# Patient Record
Sex: MALE | Race: WHITE | Employment: FULL TIME | URBAN - METROPOLITAN AREA
[De-identification: names, ages, dates, MRNs, and addresses within clinical notes are randomized per-mention and may not be internally consistent; named-entity substitution may affect disease eponyms.]

---

## 2017-01-30 RX ORDER — METOPROLOL SUCCINATE 25 MG/1
TABLET, EXTENDED RELEASE ORAL
Qty: 30 TAB | Refills: 6 | Status: SHIPPED | OUTPATIENT
Start: 2017-01-30 | End: 2017-12-16 | Stop reason: SDUPTHER

## 2017-03-22 RX ORDER — CLOPIDOGREL BISULFATE 75 MG/1
TABLET ORAL
Qty: 30 TAB | Refills: 5 | Status: SHIPPED | OUTPATIENT
Start: 2017-03-22 | End: 2017-03-23 | Stop reason: SDUPTHER

## 2017-03-24 RX ORDER — CLOPIDOGREL BISULFATE 75 MG/1
75 TABLET ORAL DAILY
Qty: 30 TAB | Refills: 5 | Status: SHIPPED | OUTPATIENT
Start: 2017-03-24 | End: 2017-09-01 | Stop reason: SDUPTHER

## 2017-05-18 ENCOUNTER — OFFICE VISIT (OUTPATIENT)
Dept: VASCULAR SURGERY | Age: 57
End: 2017-05-18

## 2017-05-18 DIAGNOSIS — Z98.890 S/P CAROTID ENDARTERECTOMY: ICD-10-CM

## 2017-05-18 NOTE — PROCEDURES
[facility]   *** FINAL REPORT ***    Name: Miranda Ferreira  MRN: [id number]       Outpatient  : 1960  HIS Order #: [id number]  [id number] [facility] Visit #: [id number]  Date: 18 May 2017    TYPE OF TEST: Cerebrovascular Duplex    REASON FOR TEST  Asymptomatic-post op follow up    Right Carotid:-             Proximal               Mid                 Distal  cm/s  Systolic  Diastolic  Systolic  Diastolic  Systolic  Diastolic  CCA:     72.6      24.0       55.0      21.0       48.0      19.0  Bulb:    92.0      21.0  ECA:    109.0      30.0  ICA:     64.0      14.0       90.0      26.0      105.0      53.0  ICA/CCA:  1.2       2.2    ICA Stenosis: <50%    Right Vertebral:-  Finding: Antegrade  Sys:       47.0  Lily:       14.0    Right Subclavian:    Left Carotid:-            Proximal                Mid                 Distal  cm/s  Systolic  Diastolic  Systolic  Diastolic  Systolic  Diastolic  CCA:    602.4      39.0       60.0      25.0       62.0      23.0  Bulb:    68.0      23.0  ECA:    100.0      25.0  ICA:    105.0      51.0       87.0      45.0       76.0      27.0  ICA/CCA:  1.0       1.3    ICA Stenosis: <50%    Left Vertebral:-  Finding: Antegrade  Sys:       43.0  Lily:       17.0    Left Subclavian:    INTERPRETATION/FINDINGS  Duplex images were obtained using 2-D gray scale, color flow and  spectral doppler analysis. 1. Patent right carotid endarterectomy with mild plaquing in the less  than 50% range. 2 .Mild plaquing of the left internal carotid artery in the less than  50% range. 3. No significant stenosis in the external carotid arteries  bilaterally. 4. Antegrade flow in both vertebral arteries. Plaque Morphology:  1. Homogeneous plaque in the bulb and right ICA. 2. Heterogeneous plaque in the bulb and left ICA. Compared to the previous study on 16 there is no change on the  left and there is mild plaquing on the right.     ADDITIONAL COMMENTS    I have personally reviewed the data relevant to the interpretation of  this  study. TECHNOLOGIST: Ramesh Curiel RVT, AARON  Signed: 05/18/2017 04:24 PM    PHYSICIAN: Raymond Harvey D.O.   Signed: 05/19/2017 09:29 AM

## 2017-06-16 ENCOUNTER — OFFICE VISIT (OUTPATIENT)
Dept: VASCULAR SURGERY | Age: 57
End: 2017-06-16

## 2017-06-16 VITALS
SYSTOLIC BLOOD PRESSURE: 132 MMHG | HEART RATE: 72 BPM | WEIGHT: 158 LBS | BODY MASS INDEX: 25.39 KG/M2 | HEIGHT: 66 IN | DIASTOLIC BLOOD PRESSURE: 84 MMHG | RESPIRATION RATE: 12 BRPM

## 2017-06-16 DIAGNOSIS — I65.23 CAROTID STENOSIS, ASYMPTOMATIC, BILATERAL: Primary | ICD-10-CM

## 2017-06-16 DIAGNOSIS — Z98.890 H/O CAROTID ENDARTERECTOMY: ICD-10-CM

## 2017-06-16 NOTE — PROGRESS NOTES
Jasmin Leon    Chief Complaint   Patient presents with    Carotid Artery Stenosis       History and Physical    Mr Fercho Madera is here now almost 3 years s/p a R CEA  He had suffered CVA and he has remained with some residual peripheral vision issues and numbness in the right arm. But he is overall grateful to still be functional. He is back to regular work routine though he has changed jobs.      He has continued to follow with neurology (Dr Cal Lugo) and has accepted his symptoms are probably not going to further improve at this point. He remains on plavix/asa. He did have STEMI at same time frame as the CVA. He follows with cardiology now on a yearly basis as well    Some persistent dizziness is leading to a follow up with ENT specialist soon    Past Medical History:   Diagnosis Date    Asthma     Autoimmune disease ([facility] [address]     psoriasis, ulcerative colitis    CAD in native artery     inf STEMI (april 2014) managed medically due to concomittant stroke; cath performed few weeks later: mild LM, LAD, RCA; 55% AVLCx (dominant).  Colitis     Colon cancer ([facility] [address]     Dyslipidemia     Heart attack ([facility] [address]     Hx of transesophageal echocardiography ([facility]) 04/28/2014    EF 50%. Basal-mid inferior hypk. No LA appendage thrombus. No R-L atrial septal shunt by IV contrast.  Mild ALYCE. No cardioembolic source for stroke.  Panic attack     S/P cardiac catheterization 05/06/2014    LM patent. LAD (sm) mild. AV-CX 55% w/linear filling defect prox to apex suggesting intimal tear. RCA very sm, nondom.      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 I69.30    Hemianopia, homonymous, left H53.462    History of ST elevation myocardial infarction (STEMI) I25.2    Vertigo R42    Headache R51    Paresthesia of right arm R20.2  Migraine, unspecified, with intractable migraine, so stated, without mention of status migrainosus G43.919    CAD in native artery I25.10    S/P carotid endarterectomy Z98.890    Colitis K52.9    Dyslipidemia E78.5    CVA, old, disturbances of vision I69.398, H53.9     Past Surgical History:   Procedure Laterality Date    HX APPENDECTOMY      HX HEART CATHETERIZATION      HX HERNIA REPAIR      HX ORTHOPAEDIC Left     Left wrist sx    NEUROLOGICAL PROCEDURE UNLISTED  6/2014    Cerbral Angiogram    VASCULAR SURGERY PROCEDURE UNLIST      R carotid endartectomy with angioplasty      Current Outpatient Prescriptions   Medication Sig Dispense Refill    clopidogrel (PLAVIX) 75 mg tab Take 1 Tab by mouth daily. 30 Tab 5    metoprolol succinate (TOPROL-XL) 25 mg XL tablet TAKE ONE TABLET BY MOUTH ONCE DAILY 30 Tab 6    pravastatin (PRAVACHOL) 80 mg tablet Take 80 mg by mouth nightly.  pantoprazole (PROTONIX) 40 mg tablet Take 40 mg by mouth daily.  topiramate (TOPAMAX) 50 mg tablet Take 1/2 tab every morning, take 1 tab at bedtime 50 tablet 5    aspirin 81 mg chewable tablet Take 162 mg by mouth daily.  ALBUTEROL IN Take  by inhalation.  buPROPion XL (WELLBUTRIN XL) 300 mg XL tablet Take 150 mg by mouth every morning.  budesonide-formoterol (SYMBICORT) 160-4.5 mcg/actuation HFA inhaler Take 2 Puffs by inhalation two (2) times a day.  nitroglycerin (NITROSTAT) 0.4 mg SL tablet 1 Tab by SubLINGual route every five (5) minutes as needed for Chest Pain (up to 3 doses only).  30 Tab 0     Allergies   Allergen Reactions    Cat Dander Itching    Chlorhexidine Gluconate Itching    Dog Dander Itching    Lipitor [Atorvastatin] Other (comments)     dizziness    Seafood Swelling    Zocor [Simvastatin] Vertigo     Same as with lipitor, once on it for about two weeks experienced vertigo, stopped when he discontinued it       Review of Systems    Review of Systems - History obtained from the patient  General ROS: negative  Psychological ROS: negative  Ophthalmic ROS: positive for - still with peripheral vision issues  Respiratory ROS: negative  Cardiovascular ROS: negative  Gastrointestinal ROS: negative  Musculoskeletal ROS: negative  Neurological ROS: residual right arm numbness, migraines, dizziness  Vascular ROS: no claudication    Physical   Visit Vitals    /84 (BP 1 Location: Left arm, BP Patient Position: Sitting)    Pulse 72    Resp 12    Ht 5' 6\" (1.676 m)    Wt 158 lb (71.7 kg)    BMI 25.5 kg/m2     General:   Alert, cooperative, no distress.    Head:   Normocephalic, without obvious abnormality, atraumatic.    Eyes:     Conjunctivae/corneas clear. Pupils equal, round, reactive to light. Extraocular movements intact.    Neck:          R CEA scar, no bruits    Lungs:    Clear to auscultation bilaterally.    Heart:   Regular rate and rhythm, S1, S2 normal    Extremities:  Extremities normal, atraumatic, no cyanosis or edema.    Pulses:  2+ and symmetric all extremities.    Skin:  Skin color, texture, turgor normal. No rashes or lesions.          Vascular studies:  1. Patent right carotid endarterectomy with mild plaquing in the less  than 50% range. 2 .Mild plaquing of the left internal carotid artery in the less than  50% range. 3. No significant stenosis in the external carotid arteries  bilaterally. 4. Antegrade flow in both vertebral arteries. Plaque Morphology:  1. Homogeneous plaque in the bulb and right ICA. 2. Heterogeneous plaque in the bulb and left ICA. Compared to the previous study on 5-9-16 there is no change on the  left and there is mild plaquing on the right. Impression/Plan:     ICD-10-CM ICD-9-CM    1. Carotid stenosis, asymptomatic, bilateral I65.23 433.10 DUPLEX CAROTID BILATERAL AMB     433.30    2.  H/O carotid endarterectomy Z98.890 V45.89 DUPLEX CAROTID BILATERAL AMB     Orders Placed This Encounter    DUPLEX CAROTID BILATERAL AMB Results reviewed and he was encouraged everything looks well and we can continue yearly follow up out to five years post op and then every 2 years thereafter if everything is stable. Continue antiplatelet therapy  Call if any questions/concerns prior to next visit    Follow-up Disposition:  Return in about 1 year (around 6/16/2018). FABIO Quezada    Portions of this note have been entered using voice recognition software.

## 2017-08-02 ENCOUNTER — HOSPITAL ENCOUNTER (EMERGENCY)
Age: 57
Discharge: HOME | End: 2017-08-02
Attending: EMERGENCY MEDICINE
Payer: COMMERCIAL

## 2017-08-02 ENCOUNTER — APPOINTMENT (OUTPATIENT)
Dept: CT IMAGING | Age: 57
End: 2017-08-02
Attending: EMERGENCY MEDICINE
Payer: COMMERCIAL

## 2017-08-02 VITALS
DIASTOLIC BLOOD PRESSURE: 75 MMHG | HEIGHT: 66 IN | OXYGEN SATURATION: 100 % | BODY MASS INDEX: 25.71 KG/M2 | WEIGHT: 160 LBS | TEMPERATURE: 98 F | HEART RATE: 56 BPM | RESPIRATION RATE: 13 BRPM | SYSTOLIC BLOOD PRESSURE: 141 MMHG

## 2017-08-02 DIAGNOSIS — G43.009 ATYPICAL MIGRAINE: Primary | ICD-10-CM

## 2017-08-02 LAB
ANION GAP BLD CALC-SCNC: 12 MMOL/L (ref 3–18)
BASOPHILS # BLD AUTO: 0 K/UL (ref 0–0.06)
BASOPHILS # BLD: 0 % (ref 0–2)
BUN SERPL-MCNC: 7 MG/DL (ref 7–18)
BUN/CREAT SERPL: 6 (ref 12–20)
CALCIUM SERPL-MCNC: 8.2 MG/DL (ref 8.5–10.1)
CHLORIDE SERPL-SCNC: 107 MMOL/L (ref 100–108)
CO2 SERPL-SCNC: 23 MMOL/L (ref 21–32)
CREAT SERPL-MCNC: 1.19 MG/DL (ref 0.6–1.3)
DIFFERENTIAL METHOD BLD: ABNORMAL
EOSINOPHIL # BLD: 0.6 K/UL (ref 0–0.4)
EOSINOPHIL NFR BLD: 7 % (ref 0–5)
ERYTHROCYTE [DISTWIDTH] IN BLOOD BY AUTOMATED COUNT: 12.7 % (ref 11.6–14.5)
GLUCOSE SERPL-MCNC: 110 MG/DL (ref 74–99)
HCT VFR BLD AUTO: 44.1 % (ref 36–48)
HGB BLD-MCNC: 14.3 G/DL (ref 13–16)
LYMPHOCYTES # BLD AUTO: 18 % (ref 21–52)
LYMPHOCYTES # BLD: 1.7 K/UL (ref 0.9–3.6)
MAGNESIUM SERPL-MCNC: 1.8 MG/DL (ref 1.6–2.6)
MCH RBC QN AUTO: 30.8 PG (ref 24–34)
MCHC RBC AUTO-ENTMCNC: 32.4 G/DL (ref 31–37)
MCV RBC AUTO: 94.8 FL (ref 74–97)
MONOCYTES # BLD: 0.9 K/UL (ref 0.05–1.2)
MONOCYTES NFR BLD AUTO: 9 % (ref 3–10)
NEUTS SEG # BLD: 6 K/UL (ref 1.8–8)
NEUTS SEG NFR BLD AUTO: 66 % (ref 40–73)
PLATELET # BLD AUTO: 325 K/UL (ref 135–420)
PMV BLD AUTO: 9.9 FL (ref 9.2–11.8)
POTASSIUM SERPL-SCNC: 4.2 MMOL/L (ref 3.5–5.5)
RBC # BLD AUTO: 4.65 M/UL (ref 4.7–5.5)
SODIUM SERPL-SCNC: 142 MMOL/L (ref 136–145)
WBC # BLD AUTO: 9.2 K/UL (ref 4.6–13.2)

## 2017-08-02 PROCEDURE — 70450 CT HEAD/BRAIN W/O DYE: CPT

## 2017-08-02 PROCEDURE — 83735 ASSAY OF MAGNESIUM: CPT | Performed by: EMERGENCY MEDICINE

## 2017-08-02 PROCEDURE — 93005 ELECTROCARDIOGRAM TRACING: CPT

## 2017-08-02 PROCEDURE — 80048 BASIC METABOLIC PNL TOTAL CA: CPT | Performed by: EMERGENCY MEDICINE

## 2017-08-02 PROCEDURE — 99284 EMERGENCY DEPT VISIT MOD MDM: CPT

## 2017-08-02 PROCEDURE — 85025 COMPLETE CBC W/AUTO DIFF WBC: CPT | Performed by: EMERGENCY MEDICINE

## 2017-08-02 RX ORDER — OXYCODONE AND ACETAMINOPHEN 5; 325 MG/1; MG/1
TABLET ORAL
Qty: 12 TAB | Refills: 0 | Status: SHIPPED | OUTPATIENT
Start: 2017-08-02 | End: 2017-09-01 | Stop reason: ALTCHOICE

## 2017-08-02 NOTE — ED TRIAGE NOTES
Pt  Co  Frontal headache  States awoke with some  Dizziness and  Fell while in the shower.  Started approx 1.5 hours ago and  States it was a punch sensation to the center of the foredhead  Also co  Visual disturbances and diff with  coordination

## 2017-08-02 NOTE — ED PROVIDER NOTES
HPI Comments: 10:44 AM Jazzy Burgos is a 49 y.o. male with a history of CAD who presents to ED via EMS for the evaluation of a severe HA that started half and hour ago. Pt states feeling dizzy last night and this morning. Pt says that he fell in the shower this morning and when he got up the room was spinning. He also states that while in the car he felt a sharp pain in his head and loss control when trying to back out but successfully called EMS. Pt is still dizzy and nauseous but denies vomiting, CP, and SOB. Pt explains that he has chronic migraines and is taking Topamax daily. No other complaints, associated symptoms or modifying factors at this time. PCP: Joshua Giles MD      The history is provided by the patient. No  was used. Past Medical History:   Diagnosis Date    Asthma     Autoimmune disease ([facility] [address]     psoriasis, ulcerative colitis    CAD in native artery     inf STEMI (april 2014) managed medically due to concomittant stroke; cath performed few weeks later: mild LM, LAD, RCA; 55% AVLCx (dominant).  Colitis     Colon cancer ([facility] [address]     Dyslipidemia     Heart attack ([facility] [address]     Hx of transesophageal echocardiography ([facility]) 04/28/2014    EF 50%. Basal-mid inferior hypk. No LA appendage thrombus. No R-L atrial septal shunt by IV contrast.  Mild ALYCE. No cardioembolic source for stroke.  Panic attack     S/P cardiac catheterization 05/06/2014    LM patent. LAD (sm) mild. AV-CX 55% w/linear filling defect prox to apex suggesting intimal tear. RCA very sm, nondom.       S/P carotid endarterectomy     R CEA (July 2014)    Stroke [facility]) 4/23/14    Parasthesis right saide, blindness left eye       Past Surgical History:   Procedure Laterality Date    HX APPENDECTOMY      HX HEART CATHETERIZATION      HX HERNIA REPAIR      HX ORTHOPAEDIC Left     Left wrist sx    NEUROLOGICAL PROCEDURE UNLISTED  6/2014    Cerbral Angiogram    VASCULAR SURGERY PROCEDURE UNLIST      R carotid endartectomy with angioplasty          Family History:   Problem Relation Age of Onset    Heart Attack Father        Social History     Social History    Marital status: SINGLE     Spouse name: N/A    Number of children: N/A    Years of education: N/A     Occupational History    Not on file. Social History Main Topics    Smoking status: Former Smoker     Packs/day: 1.00     Years: 41.00     Types: Cigarettes     Quit date: 4/23/2014    Smokeless tobacco: Never Used    Alcohol use Yes      Comment: social--very light now    Drug use: No    Sexual activity: Yes     Partners: Female     Other Topics Concern    Not on file     Social History Narrative         ALLERGIES: Cat dander; Chlorhexidine gluconate; Dog dander; Lipitor [atorvastatin]; Seafood; and Zocor [simvastatin]    Review of Systems   Respiratory: Negative for shortness of breath. Cardiovascular: Negative for chest pain. Gastrointestinal: Positive for nausea. Negative for vomiting. Neurological: Positive for dizziness and headaches. All other systems reviewed and are negative. Vitals:    08/02/17 1037   BP: 145/87   Pulse: 65   Resp: 18   Temp: 98 °F (36.7 °C)   SpO2: 98%   Weight: 72.6 kg (160 lb)   Height: 5' 6\" (1.676 m)            Physical Exam   Constitutional: He is oriented to person, place, and time. He appears well-developed and well-nourished. HENT:   Head: Normocephalic and atraumatic. Mouth/Throat: Oropharynx is clear and moist.   Eyes: Conjunctivae are normal. Pupils are equal, round, and reactive to light. No scleral icterus. Neck: Normal range of motion. Neck supple. No JVD present. No tracheal deviation present. Cardiovascular: Normal rate, regular rhythm and normal heart sounds. Pulmonary/Chest: Effort normal and breath sounds normal. No respiratory distress. He has no wheezes. Abdominal: Soft. Bowel sounds are normal.   Musculoskeletal: Normal range of motion.    Neurological: He is alert and oriented to person, place, and time. He has normal strength. Gait normal. GCS eye subscore is 4. GCS verbal subscore is 5. GCS motor subscore is 6. Skin: Skin is warm and dry. Psychiatric: He has a normal mood and affect. Nursing note and vitals reviewed. MDM  Number of Diagnoses or Management Options  Atypical migraine:   Diagnosis management comments: PT feeling \"much better now\", results reviewed with pt, he agrees with dispo and F/U plan. Agrees to return if sx worsen or not better in 24 hours. Radha Figueroa MD  1:30 PM         Amount and/or Complexity of Data Reviewed  Clinical lab tests: ordered and reviewed  Tests in the radiology section of CPT®: ordered and reviewed      ED Course       Procedures           Vitals:  Patient Vitals for the past 12 hrs:   Temp Pulse Resp BP SpO2   08/02/17 1037 98 °F (36.7 °C) 65 18 145/87 98 %   Patient is 98% O2 on RA, indicating adequate oxygenation. Medications ordered:   Medications - No data to display      Lab findings:  No results found for this or any previous visit (from the past 12 hour(s)). EKG interpretation by ED Physician:  1:14 PM As read by ED physician, NSR 65 with no acute changes. X-Ray, CT or other radiology findings or impressions:  No results found. Progress notes, Consult notes or additional Procedure notes:       Disposition:  Diagnosis: No diagnosis found. Disposition: Discharged    Follow-up Information     None           Patient's Medications   Start Taking    No medications on file   Continue Taking    ALBUTEROL IN    Take  by inhalation. ASPIRIN 81 MG CHEWABLE TABLET    Take 162 mg by mouth daily. BUDESONIDE-FORMOTEROL (SYMBICORT) 160-4.5 MCG/ACTUATION HFA INHALER    Take 2 Puffs by inhalation two (2) times a day. BUPROPION XL (WELLBUTRIN XL) 300 MG XL TABLET    Take 150 mg by mouth every morning. CLOPIDOGREL (PLAVIX) 75 MG TAB    Take 1 Tab by mouth daily.     METOPROLOL SUCCINATE (TOPROL-XL) 25 MG XL TABLET    TAKE ONE TABLET BY MOUTH ONCE DAILY    NITROGLYCERIN (NITROSTAT) 0.4 MG SL TABLET    1 Tab by SubLINGual route every five (5) minutes as needed for Chest Pain (up to 3 doses only). PANTOPRAZOLE (PROTONIX) 40 MG TABLET    Take 40 mg by mouth daily. PRAVASTATIN (PRAVACHOL) 80 MG TABLET    Take 80 mg by mouth nightly. TOPIRAMATE (TOPAMAX) 50 MG TABLET    Take 1/2 tab every morning, take 1 tab at bedtime   These Medications have changed    No medications on file   Stop Taking    No medications on file       Scribe Attestation:   Madyson Devi acting as a scribe for and in the presence of Blanca Caballero MD August 02, 2017 at 10:44 AM     Signed by: Verna Cerda, August 02, 2017 at 10:44 AM     Provider Attestation:   I personally performed the services described in the documentation, reviewed the documentation, as recorded by the scribe in my presence, and it accurately and completely records my words and actions.      Reviewed and signed by:  Blanca Caballero MD

## 2017-08-02 NOTE — DISCHARGE INSTRUCTIONS
Migraine Headache: Care Instructions  Your Care Instructions  Migraines are painful, throbbing headaches that often start on one side of the head. They may cause nausea and vomiting and make you sensitive to light, sound, or smell. Without treatment, migraines can last from 4 hours to a few days. Medicines can help prevent migraines or stop them after they have started. Your doctor can help you find which ones work best for you. Follow-up care is a key part of your treatment and safety. Be sure to make and go to all appointments, and call your doctor if you are having problems. It's also a good idea to know your test results and keep a list of the medicines you take. How can you care for yourself at home? · Do not drive if you have taken a prescription pain medicine. · Rest in a quiet, dark room until your headache is gone. Close your eyes, and try to relax or go to sleep. Don't watch TV or read. · Put a cold, moist cloth or cold pack on the painful area for 10 to 20 minutes at a time. Put a thin cloth between the cold pack and your skin. · Use a warm, moist towel or a heating pad set on low to relax tight shoulder and neck muscles. · Have someone gently massage your neck and shoulders. · Take your medicines exactly as prescribed. Call your doctor if you think you are having a problem with your medicine. You will get more details on the specific medicines your doctor prescribes. · Be careful not to take pain medicine more often than the instructions allow. You could get worse or more frequent headaches when the medicine wears off. To prevent migraines  · Keep a headache diary so you can figure out what triggers your headaches. Avoiding triggers may help you prevent headaches. Record when each headache began, how long it lasted, and what the pain was like.  (Was it throbbing, aching, stabbing, or dull?) Write down any other symptoms you had with the headache, such as nausea, flashing lights or dark spots, or sensitivity to bright light or loud noise. Note if the headache occurred near your period. List anything that might have triggered the headache. Triggers may include certain foods (chocolate, cheese, wine) or odors, smoke, bright light, stress, or lack of sleep. · If your doctor has prescribed medicine for your migraines, take it as directed. You may have medicine that you take only when you get a migraine and medicine that you take all the time to help prevent migraines. ¨ If your doctor has prescribed medicine for when you get a headache, take it at the first sign of a migraine, unless your doctor has given you other instructions. ¨ If your doctor has prescribed medicine to prevent migraines, take it exactly as prescribed. Call your doctor if you think you are having a problem with your medicine. · Find healthy ways to deal with stress. Migraines are most common during or right after stressful times. Take time to relax before and after you do something that has caused a migraine in the past.  · Try to keep your muscles relaxed by keeping good posture. Check your jaw, face, neck, and shoulder muscles for tension. Try to relax them. When you sit at a desk, change positions often. And make sure to stretch for 30 seconds each hour. · Get plenty of sleep and exercise. · Eat meals on a regular schedule. Avoid foods and drinks that often trigger migraines. These include chocolate, alcohol (especially red wine and port), aspartame, monosodium glutamate (MSG), and some additives found in foods (such as hot dogs, flores, cold cuts, aged cheeses, and pickled foods). · Limit caffeine. Don't drink too much coffee, tea, or soda. But don't quit caffeine suddenly. That can also give you migraines. · Do not smoke or allow others to smoke around you. If you need help quitting, talk to your doctor about stop-smoking programs and medicines. These can increase your chances of quitting for good.   · If you are taking birth control pills or hormone therapy, talk to your doctor about whether they are triggering your migraines. When should you call for help? Call 911 anytime you think you may need emergency care. For example, call if:  · You have signs of a stroke. These may include:  ¨ Sudden numbness, paralysis, or weakness in your face, arm, or leg, especially on only one side of your body. ¨ Sudden vision changes. ¨ Sudden trouble speaking. ¨ Sudden confusion or trouble understanding simple statements. ¨ Sudden problems with walking or balance. ¨ A sudden, severe headache that is different from past headaches. Call your doctor now or seek immediate medical care if:  · You have new or worse nausea and vomiting. · You have a new or higher fever. · Your headache gets much worse. Watch closely for changes in your health, and be sure to contact your doctor if:  · You are not getting better after 2 days (48 hours). Where can you learn more? Go to http://sharon-rosa.info/. Enter X116 in the search box to learn more about \"Migraine Headache: Care Instructions. \"  Current as of: October 14, 2016  Content Version: 11.3  © 7324-6143 [employer]. Care instructions adapted under license by "[employer] (which disclaims liability or warranty for this information). If you have questions about a medical condition or this instruction, always ask your healthcare professional. [address] any warranty or liability for your use of this information.

## 2017-08-02 NOTE — LETTER
NOTIFICATION RETURN TO WORK / SCHOOL 
 
8/2/2017 1:16 PM 
 
Mr. Christa Hinton [address] [place] [id number] [address] [id number] To Whom It May Concern: 
 
Christa Hinton is currently under the care of [address] [place] [address] EMERGENCY DEPT. He will return to work/school on: 8/4/2017 If there are questions or concerns please have the patient contact our office.  
 
 
 
Sincerely, 
 
 
Jackson Lara MD

## 2017-08-03 LAB
ATRIAL RATE: 65 BPM
CALCULATED P AXIS, ECG09: 56 DEGREES
CALCULATED R AXIS, ECG10: -33 DEGREES
CALCULATED T AXIS, ECG11: 4 DEGREES
DIAGNOSIS, 93000: NORMAL
P-R INTERVAL, ECG05: 140 MS
Q-T INTERVAL, ECG07: 438 MS
QRS DURATION, ECG06: 88 MS
QTC CALCULATION (BEZET), ECG08: 455 MS
VENTRICULAR RATE, ECG03: 65 BPM

## 2017-09-01 ENCOUNTER — OFFICE VISIT (OUTPATIENT)
Dept: CARDIOLOGY CLINIC | Age: 57
End: 2017-09-01

## 2017-09-01 VITALS
SYSTOLIC BLOOD PRESSURE: 140 MMHG | HEIGHT: 66 IN | WEIGHT: 166 LBS | HEART RATE: 73 BPM | OXYGEN SATURATION: 98 % | DIASTOLIC BLOOD PRESSURE: 92 MMHG | BODY MASS INDEX: 26.68 KG/M2

## 2017-09-01 DIAGNOSIS — E78.5 DYSLIPIDEMIA: ICD-10-CM

## 2017-09-01 DIAGNOSIS — Z98.890 S/P CAROTID ENDARTERECTOMY: ICD-10-CM

## 2017-09-01 DIAGNOSIS — I77.9 RIGHT-SIDED CAROTID ARTERY DISEASE (HCC): ICD-10-CM

## 2017-09-01 DIAGNOSIS — I25.10 CAD IN NATIVE ARTERY: Primary | ICD-10-CM

## 2017-09-01 RX ORDER — CLOPIDOGREL BISULFATE 75 MG/1
75 TABLET ORAL DAILY
Qty: 30 TAB | Refills: 11 | Status: SHIPPED | OUTPATIENT
Start: 2017-09-01 | End: 2017-09-27 | Stop reason: SDUPTHER

## 2017-09-01 NOTE — MR AVS SNAPSHOT
Visit Information Date & Time Provider Department Dept. Phone Encounter #  
 9/1/2017  9:00 AM Iman Leonard MD [facility] [address] [id number] Your Appointments 6/18/2018 10:00 AM  
PROCEDURE with [facility] 1 [facility] ([facility]) Appt Note: cv knaak 1 year 1212 [facility] [address] [facility] [address]  
[phone] 1212 [facility] 47 [facility]  
  
    
 6/28/2018  9:00 AM  
Follow Up with F[facility] ([facility]) Appt Note: 3 YR CV 90 [place], [place] [address] [facility] [address]  
[phone] 1212 [facility] 6000 [facility], [facility] [address] [facility] [address] Upcoming Health Maintenance Date Due Hepatitis C Screening 1960 DTaP/Tdap/Td series (1 - Tdap) 11/3/1983 FOBT Q 1 YEAR AGE 50-75 11/3/2012 INFLUENZA AGE 9 TO ADULT 8/1/2017 Allergies as of 9/1/2017  Review Complete On: 8/2/2017 By: Lora Cabral RN Severity Noted Reaction Type Reactions Cat Dander  05/22/2014    Itching Chlorhexidine Gluconate  07/07/2014    Itching Dog Dander  05/22/2014    Itching Lipitor [Atorvastatin]  06/19/2014    Other (comments)  
 dizziness Seafood  06/30/2014    Swelling Zocor [Simvastatin]  08/29/2014    Vertigo Same as with lipitor, once on it for about two weeks experienced vertigo, stopped when he discontinued it Current Immunizations  Never Reviewed No immunizations on file. Not reviewed this visit Vitals BP Pulse Height(growth percentile) Weight(growth percentile) SpO2 BMI  
 (!) 140/92 73 5' 6\" (1.676 m) 166 lb (75.3 kg) 98% 26.79 kg/m2 Smoking Status Former Smoker Vitals History BMI and BSA Data Body Mass Index Body Surface Area  
 26.79 kg/m 2 1.87 m 2 Preferred Pharmacy Pharmacy Name Phone [facility] [address] [phone] Your Updated Medication List  
  
   
This list is accurate as of: 9/1/17 10:10 AM.  Always use your most recent med list.  
  
  
  
  
 ALBUTEROL IN Take  by inhalation. aspirin 81 mg chewable tablet Take 162 mg by mouth daily. clopidogrel 75 mg Tab Commonly known as:  PLAVIX Take 1 Tab by mouth daily. metoprolol succinate 25 mg XL tablet Commonly known as:  TOPROL-XL  
TAKE ONE TABLET BY MOUTH ONCE DAILY  
  
 nitroglycerin 0.4 mg SL tablet Commonly known as:  NITROSTAT  
1 Tab by SubLINGual route every five (5) minutes as needed for Chest Pain (up to 3 doses only). pantoprazole 40 mg tablet Commonly known as:  PROTONIX Take 40 mg by mouth daily. pravastatin 80 mg tablet Commonly known as:  PRAVACHOL Take 80 mg by mouth nightly. SYMBICORT 160-4.5 mcg/actuation HFA inhaler Generic drug:  budesonide-formoterol Take 2 Puffs by inhalation two (2) times a day. topiramate 50 mg tablet Commonly known as:  TOPAMAX Take 1/2 tab every morning, take 1 tab at bedtime Please provide this summary of care documentation to your next provider. Your primary care clinician is listed as Fei Ariza. If you have any questions after today's visit, please call [phone].

## 2017-09-01 NOTE — PROGRESS NOTES
HISTORY OF PRESENT ILLNESS  Kelsi Rachel is a 49 y.o. male. HPI      Patient presents for a followup office visit. He has a past medical history significant for single vessel coronary artery disease. He had an inferior wall myocardial infarction in April 2014 which was managed medically because of an ongoing stroke at the same time. He eventually underwent a cardiac catheterization which showed moderate nonobstructive disease in a dominant circumflex vessel. He underwent a stress test in September 2016 was showed a fixed inferior perfusion defect with ejection fraction of 53%, which was not significantly changed compared to his prior study from 2014. He also has a history of carotid artery disease, status post a right CEA in July 2014 which was felt to be the culprit lesion for his CVAs. He was a heavy smoker at that time but has since stopped smoking. He was last seen in the office approximately 12 months ago. Since last visit he states his been doing fairly well. He had a follow-up carotid scan done in May of this year which showed a patent right CEA with mild bilateral disease. His pravastatin dosage was increased up to 80 mg since last visit and a repeat lipid panel showed improvement with the medication adjustment. He denies any chest pain, no shortness of breath, no leg swelling or claudication. No significant change in his activity tolerance. Past Medical History:   Diagnosis Date    Asthma     Autoimmune disease ([address]     psoriasis, ulcerative colitis    CAD in native artery     inf STEMI (april 2014) managed medically due to concomittant stroke; cath performed few weeks later: mild LM, LAD, RCA; 55% AVLCx (dominant).  Colitis     [facility]     Dyslipidemia     Heart attack ([facility]     Hx of transesophageal echocardiography ([facility]) 04/28/2014    EF 50%. Basal-mid inferior hypk. No LA appendage thrombus. No R-L atrial septal shunt by IV contrast.  Mild ALYCE.   No cardioembolic source for stroke.  Panic attack     S/P cardiac catheterization 05/06/2014    LM patent. LAD (sm) mild. AV-CX 55% w/linear filling defect prox to apex suggesting intimal tear. RCA very sm, nondom.  S/P carotid endarterectomy     R CEA (July 2014)    Stroke [facility]) 4/23/14    Parasthesis right saide, blindness left eye      Current Outpatient Prescriptions   Medication Sig Dispense Refill    clopidogrel (PLAVIX) 75 mg tab Take 1 Tab by mouth daily. 30 Tab 5    metoprolol succinate (TOPROL-XL) 25 mg XL tablet TAKE ONE TABLET BY MOUTH ONCE DAILY 30 Tab 6    pravastatin (PRAVACHOL) 80 mg tablet Take 80 mg by mouth nightly.  pantoprazole (PROTONIX) 40 mg tablet Take 40 mg by mouth daily.  topiramate (TOPAMAX) 50 mg tablet Take 1/2 tab every morning, take 1 tab at bedtime (Patient taking differently: Take 100 mg by mouth two (2) times a day. Take 1/2 tab every morning, take 1 tab at bedtime) 50 tablet 5    aspirin 81 mg chewable tablet Take 162 mg by mouth daily.  ALBUTEROL IN Take  by inhalation.  budesonide-formoterol (SYMBICORT) 160-4.5 mcg/actuation HFA inhaler Take 2 Puffs by inhalation two (2) times a day.  nitroglycerin (NITROSTAT) 0.4 mg SL tablet 1 Tab by SubLINGual route every five (5) minutes as needed for Chest Pain (up to 3 doses only).  30 Tab 0       Allergies   Allergen Reactions    Cat Dander Itching    Chlorhexidine Gluconate Itching    Dog Dander Itching    Lipitor [Atorvastatin] Other (comments)     dizziness    Seafood Swelling    Zocor [Simvastatin] Vertigo     Same as with lipitor, once on it for about two weeks experienced vertigo, stopped when he discontinued it      Social History   Substance Use Topics    Smoking status: Former Smoker     Packs/day: 1.00     Years: 41.00     Types: Cigarettes     Quit date: 4/23/2014    Smokeless tobacco: Never Used    Alcohol use Yes      Comment: social--very light now          Review of Systems Constitutional: Negative for chills, fever and weight loss. HENT: Negative for nosebleeds. Eyes: Negative for blurred vision and double vision. Respiratory: Negative for cough, shortness of breath and wheezing. Cardiovascular: Negative for chest pain, palpitations, orthopnea, claudication, leg swelling and PND. Gastrointestinal: Negative for abdominal pain, heartburn, nausea and vomiting. Genitourinary: Negative for dysuria and hematuria. Musculoskeletal: Negative for falls and myalgias. Skin: Negative for rash. Neurological: Negative for dizziness, focal weakness and headaches. Endo/Heme/Allergies: Does not bruise/bleed easily. Psychiatric/Behavioral: Negative for substance abuse. Visit Vitals    BP (!) 140/92    Pulse 73    Ht 5' 6\" (1.676 m)    Wt 75.3 kg (166 lb)    SpO2 98%    BMI 26.79 kg/m2      Physical Exam   Constitutional: He is oriented to person, place, and time. He appears well-developed and well-nourished. HENT:   Head: Normocephalic and atraumatic. Eyes: Conjunctivae are normal.   Neck: Neck supple. No JVD present. Carotid bruit is not present. Cardiovascular: Normal rate, regular rhythm, S1 normal, S2 normal and normal pulses. Exam reveals no gallop and no S3. No murmur heard. Pulmonary/Chest: Breath sounds normal. He has no wheezes. He has no rales. Abdominal: Soft. Bowel sounds are normal. There is no tenderness. Musculoskeletal: He exhibits no edema. Neurological: He is alert and oriented to person, place, and time. Skin: Skin is warm and dry. 8/02/2017 EKG: Normal sinus rhythm, borderline inferior Q waves consistent with prior infarct, no ST or T wave abnormalities. Normal QTc interval.  No change compared to the previous EKG. ASSESSMENT and PLAN    Coronary artery disease.   Patient had single vessel coronary disease on cardiac catheterization in 2014 With a moderate nonobstructive lesion found in his left circumflex, which is a dominant vessel. His last stress test was in September 2016 which demonstrated a fixed inferior wall perfusion defect consistent with prior infarct, but no ischemia. EF 53%. Patient remains on dual antiplatelet therapy with aspirin and Plavix, along with a low-dose beta-blocker and pravastatin. Dyslipidemia. Patient is currently tolerating pravastatin 80 mg daily. He did not tolerate either Lipitor or Crestor because of severe vertigo. His most recent lipid panel from August 2017: Total cholesterol 213, HDL 66, . I would prefer that his LDL around 70. However, this is a significant improvement to his lipids from a year prior. I would advise lifestyle modification, but if his LDL remains similar, would consider 1 of the new injectable PCSK9 inhibitors. Carotid artery disease. Status post right CEA in July 2014 for symptomatic severe disease in the setting of a CVA. Patient had a followup duplex scan in May 2017 which demonstrated widely patent right CEA with mild left ICA disease. History of CVA. The patient does have residual visual field deficits and his high and some hemideficit in his upper extremity. Follow-up annually, sooner if needed.

## 2017-09-01 NOTE — PROGRESS NOTES
1. Have you been to the ER, urgent care clinic since your last visit? Hospitalized since your last visit? Yes, 8/2/17 for diziness     2. Have you seen or consulted any other health care providers outside of the [address] [place] [address] since your last visit? Include any pap smears or colon screening.   No

## 2017-09-27 RX ORDER — CLOPIDOGREL BISULFATE 75 MG/1
75 TABLET ORAL DAILY
Qty: 90 TAB | Refills: 3 | Status: SHIPPED | OUTPATIENT
Start: 2017-09-27 | End: 2018-03-05 | Stop reason: SDUPTHER

## 2017-12-18 RX ORDER — METOPROLOL SUCCINATE 25 MG/1
TABLET, EXTENDED RELEASE ORAL
Qty: 90 TAB | Refills: 3 | Status: SHIPPED | OUTPATIENT
Start: 2017-12-18 | End: 2018-12-31 | Stop reason: SDUPTHER

## 2018-01-20 NOTE — ED NOTES
I have reviewed discharge instructions with the patient. The patient verbalized understanding. Heart failure

## 2018-03-05 RX ORDER — CLOPIDOGREL BISULFATE 75 MG/1
75 TABLET ORAL DAILY
Qty: 90 TAB | Refills: 3 | Status: SHIPPED | OUTPATIENT
Start: 2018-03-05 | End: 2019-03-13 | Stop reason: SDUPTHER

## 2018-03-08 ENCOUNTER — OFFICE VISIT (OUTPATIENT)
Dept: CARDIOLOGY CLINIC | Age: 58
End: 2018-03-08

## 2018-03-08 VITALS
SYSTOLIC BLOOD PRESSURE: 128 MMHG | HEIGHT: 66 IN | HEART RATE: 58 BPM | OXYGEN SATURATION: 98 % | WEIGHT: 168 LBS | DIASTOLIC BLOOD PRESSURE: 82 MMHG | BODY MASS INDEX: 27 KG/M2

## 2018-03-08 DIAGNOSIS — E78.5 DYSLIPIDEMIA: ICD-10-CM

## 2018-03-08 DIAGNOSIS — Z98.890 S/P CAROTID ENDARTERECTOMY: ICD-10-CM

## 2018-03-08 DIAGNOSIS — G45.9 TRANSIENT CEREBRAL ISCHEMIA, UNSPECIFIED TYPE: ICD-10-CM

## 2018-03-08 DIAGNOSIS — I25.10 CAD IN NATIVE ARTERY: Primary | ICD-10-CM

## 2018-03-08 DIAGNOSIS — R42 DIZZINESS: ICD-10-CM

## 2018-03-08 NOTE — PATIENT INSTRUCTIONS
30 day event monitor; Dx:  Hx of CVA, recent TIAs  Follow-up with Dr. Mauro Lebron as scheduled and as needed         Learning About Transient Ischemic Attack (TIA)  What is a TIA? A transient ischemic attack (TIA) means that the blood flow to a part of the brain is blocked for a short time. A TIA feels like a stroke but usually lasts only 10 to 20 minutes. Unlike a stroke, a TIA does not cause lasting brain damage. A TIA is usually caused by a blood clot that blocks blood flow in the brain. A blood clot can form in another part of the body (often the heart) and travel through the bloodstream to the brain. When blood flow to part of the brain is blocked, the brain cells in that area are affected within seconds. This causes symptoms in parts of the body controlled by those brain cells. When the blood clot dissolves, blood flow returns, and the symptoms go away. Blood clots can be the result of hardening of the arteries (atherosclerosis) or a heart attack. Sometimes a TIA is caused by a sharp drop in blood pressure that reduces blood flow to the brain. This is called a \"low-flow\" TIA. It is not as common as a TIA caused by a blood clot. What happens after a TIA? TIA is a serious warning sign of a possible stroke in the future. If you have other medical conditions such as coronary artery disease or atherosclerosis, you may also have an increased risk for a heart attack. Talk to your doctor about your risk. Understanding your risk will help you and your doctor plan your treatment options. You can do a lot to lower your chance of having another TIA or a stroke. Medicines can help, and you may also need to make lifestyle changes. What are the symptoms? Symptoms of a TIA are the same as symptoms of a stroke. But symptoms of a TIA don't last very long. Most of the time, they go away in 10 to 20 minutes.  They may include:  · Sudden numbness, tingling, weakness, or loss of movement in your face, arm, or leg, especially on only one side of your body. · Sudden vision changes. · Sudden trouble speaking. · Sudden confusion or trouble understanding simple statements. · Sudden problems with walking or balance. If you have any of these symptoms, call 911 or other emergency services right away. Ask your family, friends, and coworkers to learn the signs of a TIA. They may notice these signs before you do. Make sure they know to call 911 if these signs appear. How is a TIA treated? If you've had a TIA, you may need more testing and treatment after you get checked by your doctor. If you have a high risk of stroke, you may have to stay in the hospital for treatment. Your treatment for a TIA may include taking medicines to prevent blood clots or a stroke, or having surgery to reopen narrow arteries. How can you prevent another TIA? · Work with your doctor to treat any health problems you have. High blood pressure, high cholesterol, atrial fibrillation, and diabetes all raise your chances of having a stroke. · Be safe with medicines. Take your medicine exactly as prescribed. Call your doctor if you think you are having a problem with your medicine. · Have a healthy lifestyle. ¨ Do not smoke or allow others to smoke around you. If you need help quitting, talk to your doctor about stop-smoking programs and medicines. These can increase your chances of quitting for good. Smoking makes a stroke more likely. ¨ Limit alcohol to 2 drinks a day for men and 1 drink a day for women. ¨ Lose weight if you need to. A healthy weight will help you keep your heart and body healthy. ¨ Be active. Ask your doctor what type and level of activity are safe for you. ¨ Eat heart-healthy foods, like fruits, vegetables, and high-fiber foods. Follow-up care is a key part of your treatment and safety. Be sure to make and go to all appointments, and call your doctor if you are having problems.  It's also a good idea to know your test results and keep a list of the medicines you take. Where can you learn more? Go to http://sharon-rosa.info/. Enter L582 in the search box to learn more about \"Learning About Transient Ischemic Attack (TIA). \"  Current as of: March 20, 2017  Content Version: 11.4  © 6369-6660 [employer], [employer]. Care instructions adapted under license by [employer] (which disclaims liability or warranty for this information). If you have questions about a medical condition or this instruction, always ask your healthcare professional. [address] any warranty or liability for your use of this information.

## 2018-03-08 NOTE — PROGRESS NOTES
1. Have you been to the ER, urgent care clinic since your last visit? Hospitalized since your last visit? Yes    2. Have you seen or consulted any other health care providers outside of the [address] [place] [address] since your last visit? Include any pap smears or colon screening.  No

## 2018-03-08 NOTE — MR AVS SNAPSHOT
[address] [place] [address] [address] Krish Leung [id number] [phone] Patient: Radha De Paz MRN: [id number] Visit Information Date & Time Provider Department Dept. Phone Encounter #  
 3/8/2018 10:30 AM Denice Hamman, NP [facility] [id number] Your Appointments 4/16/2018  4:20 PM  
[facility] with General Rishi MD  
[facility] ([facility]) Appt Note: PH; s/p TIA; Jerome Lynne saw on 3/8 and 30 day event monitor ordered Altagracia Leung [id number]  
[phone] 2300 [facility] [address] 6/18/2018 10:00 AM  
PROCEDURE with [facility] 1 [facility] ([facility]) Appt Note: cv knaak 1 year 2300 [facility] [phone] [facility]  
[phone] [address]  
  
    
 6/28/2018  9:00 AM  
Follow Up with [facility] ([facility]) Appt Note: 1 YR CV [address] [phone] [facility]  
[phone] [address]  
  
    
 9/10/2018  9:20 AM  
Follow Up with General Rishi MD  
[facility] ([facility]) Appt Note: 1 year follow up Altagracia Leung [id number]  
[phone] 2300 [facility] [address] Upcoming Health Maintenance Date Due Hepatitis C Screening 1960 DTaP/Tdap/Td series (1 - Tdap) 11/3/1983 FOBT Q 1 YEAR AGE 50-75 11/3/2012 Influenza Age 5 to Adult 8/1/2017 Allergies as of 3/8/2018  Review Complete On: 3/8/2018 By: Denice Hamman, NP  
  
 Severity Noted Reaction Type Reactions Cat Dander  05/22/2014    Itching Chlorhexidine Gluconate  07/07/2014    Itching Dog Dander  05/22/2014    Itching Lipitor [Atorvastatin]  06/19/2014    Other (comments)  
 dizziness Seafood  06/30/2014    Swelling Zocor [Simvastatin]  08/29/2014    Vertigo Same as with lipitor, once on it for about two weeks experienced vertigo, stopped when he discontinued it Current Immunizations  Never Reviewed No immunizations on file. Not reviewed this visit You Were Diagnosed With   
  
 Codes Comments CAD in native artery    -  Primary ICD-10-CM: I25.10 ICD-9-CM: 414.01 Dyslipidemia     ICD-10-CM: E78.5 ICD-9-CM: 272.4 S/P carotid endarterectomy     ICD-10-CM: B06.814 ICD-9-CM: V45.89 Dizziness     ICD-10-CM: X96 ICD-9-CM: 780.4 Transient cerebral ischemia, unspecified type     ICD-10-CM: G45.9 ICD-9-CM: 435.9 Vitals BP Pulse Height(growth percentile) Weight(growth percentile) SpO2 BMI  
 128/82 (!) 58 5' 6\" (1.676 m) 168 lb (76.2 kg) 98% 27.12 kg/m2 Smoking Status Former Smoker BMI and BSA Data Body Mass Index Body Surface Area  
 27.12 kg/m 2 1.88 m 2 Preferred Pharmacy Pharmacy Name Phone [address]. [phone] Your Updated Medication List  
  
   
This list is accurate as of 3/8/18 11:00 AM.  Always use your most recent med list.  
  
  
  
  
 ALBUTEROL IN Take  by inhalation. aspirin 81 mg chewable tablet Take 162 mg by mouth daily. clopidogrel 75 mg Tab Commonly known as:  PLAVIX Take 1 Tab by mouth daily. metoprolol succinate 25 mg XL tablet Commonly known as:  TOPROL-XL  
TAKE ONE TABLET BY MOUTH ONCE DAILY  
  
 nitroglycerin 0.4 mg SL tablet Commonly known as:  NITROSTAT  
1 Tab by SubLINGual route every five (5) minutes as needed for Chest Pain (up to 3 doses only). pantoprazole 40 mg tablet Commonly known as:  PROTONIX Take 40 mg by mouth daily. pravastatin 80 mg tablet Commonly known as:  PRAVACHOL Take 80 mg by mouth nightly. topiramate 50 mg tablet Commonly known as:  TOPAMAX Take 1/2 tab every morning, take 1 tab at bedtime We Performed the Following AMB POC EKG ROUTINE W/ 12 LEADS, INTER & REP [85277 CPT(R)] Patient Instructions 30 day event monitor; Dx:  Hx of CVA, recent TIAs Follow-up with Dr. Mohini Dickinson as scheduled and as needed Learning About Transient Ischemic Attack (TIA) What is a TIA? A transient ischemic attack (TIA) means that the blood flow to a part of the brain is blocked for a short time. A TIA feels like a stroke but usually lasts only 10 to 20 minutes. Unlike a stroke, a TIA does not cause lasting brain damage. A TIA is usually caused by a blood clot that blocks blood flow in the brain. A blood clot can form in another part of the body (often the heart) and travel through the bloodstream to the brain. When blood flow to part of the brain is blocked, the brain cells in that area are affected within seconds. This causes symptoms in parts of the body controlled by those brain cells. When the blood clot dissolves, blood flow returns, and the symptoms go away. Blood clots can be the result of hardening of the arteries (atherosclerosis) or a heart attack. Sometimes a TIA is caused by a sharp drop in blood pressure that reduces blood flow to the brain. This is called a \"low-flow\" TIA. It is not as common as a TIA caused by a blood clot. What happens after a TIA? TIA is a serious warning sign of a possible stroke in the future. If you have other medical conditions such as coronary artery disease or atherosclerosis, you may also have an increased risk for a heart attack. Talk to your doctor about your risk.  Understanding your risk will help you and your doctor plan your treatment options. You can do a lot to lower your chance of having another TIA or a stroke. Medicines can help, and you may also need to make lifestyle changes. What are the symptoms? Symptoms of a TIA are the same as symptoms of a stroke. But symptoms of a TIA don't last very long. Most of the time, they go away in 10 to 20 minutes. They may include: 
· Sudden numbness, tingling, weakness, or loss of movement in your face, arm, or leg, especially on only one side of your body. · Sudden vision changes. · Sudden trouble speaking. · Sudden confusion or trouble understanding simple statements. · Sudden problems with walking or balance. If you have any of these symptoms, call 911 or other emergency services right away. Ask your family, friends, and coworkers to learn the signs of a TIA. They may notice these signs before you do. Make sure they know to call 911 if these signs appear. How is a TIA treated? If you've had a TIA, you may need more testing and treatment after you get checked by your doctor. If you have a high risk of stroke, you may have to stay in the hospital for treatment. Your treatment for a TIA may include taking medicines to prevent blood clots or a stroke, or having surgery to reopen narrow arteries. How can you prevent another TIA? · Work with your doctor to treat any health problems you have. High blood pressure, high cholesterol, atrial fibrillation, and diabetes all raise your chances of having a stroke. · Be safe with medicines. Take your medicine exactly as prescribed. Call your doctor if you think you are having a problem with your medicine. · Have a healthy lifestyle. ¨ Do not smoke or allow others to smoke around you. If you need help quitting, talk to your doctor about stop-smoking programs and medicines. These can increase your chances of quitting for good. Smoking makes a stroke more likely. ¨ Limit alcohol to 2 drinks a day for men and 1 drink a day for women. ¨ Lose weight if you need to. A healthy weight will help you keep your heart and body healthy. ¨ Be active. Ask your doctor what type and level of activity are safe for you. ¨ Eat heart-healthy foods, like fruits, vegetables, and high-fiber foods. Follow-up care is a key part of your treatment and safety. Be sure to make and go to all appointments, and call your doctor if you are having problems. It's also a good idea to know your test results and keep a list of the medicines you take. Where can you learn more? Go to http://sharon-rosa.info/. Enter Z299 in the search box to learn more about \"Learning About Transient Ischemic Attack (TIA). \" 
Current as of: March 20, 2017 Content Version: 11.4 © 1056-0173 [employer]. Care instructions adapted under license by [employer] (which disclaims liability or warranty for this information). If you have questions about a medical condition or this instruction, always ask your healthcare professional. [address] any warranty or liability for your use of this information. Please provide this summary of care documentation to your next provider. Your primary care clinician is listed as Fei Ariza. If you have any questions after today's visit, please call [phone].

## 2018-03-08 NOTE — PROGRESS NOTES
Christina Orozco presents today for a post-hospital follow-up. He was hospitalized at the [facility] after a TIA. He presented to [facility] on February 27, 2018 with complaints of vertigo, right facial sensory loss, slurred speech, and worsening right hemiparesis lasting for approximately 15 minutes. Neurology was consulted and an MRI of the head was unremarkable. PVL of the carotids showed mild plaque (less than 50%) is present in the right internal carotid artery that is not associated with a hemodynamically significant stenosis. Also noted was a moderate 50-69% stenosis in the left internal carotid artery proceeded with atherosclerotic plaque. He was treated with Plavix and statin. Her to discharge, he was back to baseline. Echocardiogram was done during his hospital stay and it showed normal left ventricular size with normal wall thickness, EF of 85%, normal diastolic function, normal pulmonary artery pressure, no hemodynamically significant valvular pathology, and negative bubble study ×2. Neurology did recommend outpatient prolonged cardiac monitoring to evaluate for atrial fibrillation. He is a 31-year-old male with history of hyperlipidemia, coronary artery disease (status post inferior wall MI in 2014 with single-vessel disease), peripheral vascular disease (status post right CEA in 2014), and CVA in 2014. He has history of 2 CVAs in 2014 as well as a TIA in March 2017 , and another TIA in July or August 2017. 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 He was last seen in the office by Dr. Thuy Camarena in September 2017. He states that he has been doing fairly well since being discharged. He has not had any further TIAs. Denies chest pain, tightness, heaviness. He states that he has really never noticed any palpitations.   Denies shortness of breath at rest, dyspnea on exertion, orthopnea and PND. Denies abdominal bloating. Denies lightheadedness, dizziness, and syncope. Denies lower extremity edema and claudication. Denies nausea, vomiting, diarrhea, melena, hematochezia. Denies hematuria, urgency, frequency. Denies fever, chills. PMH:  Past Medical History:   Diagnosis Date    Asthma     Autoimmune disease ([facility] [address]     psoriasis, ulcerative colitis    CAD in native artery     inf STEMI (april 2014) managed medically due to concomittant stroke; cath performed few weeks later: mild LM, LAD, RCA; 55% AVLCx (dominant).  Colitis     Colon cancer ([facility] [address]     Dyslipidemia     Heart attack     Hx of transesophageal echocardiography ([facility]) 04/28/2014    EF 50%. Basal-mid inferior hypk. No LA appendage thrombus. No R-L atrial septal shunt by IV contrast.  Mild ALYCE. No cardioembolic source for stroke.  Panic attack     S/P cardiac catheterization 05/06/2014    LM patent. LAD (sm) mild. AV-CX 55% w/linear filling defect prox to apex suggesting intimal tear. RCA very sm, nondom.  S/P carotid endarterectomy     R CEA (July 2014)    Stroke [facility]) 4/23/14    Parasthesis right saide, blindness left eye       PSH:  Past Surgical History:   Procedure Laterality Date    HX APPENDECTOMY      HX HEART CATHETERIZATION      HX HERNIA REPAIR      HX ORTHOPAEDIC Left     Left wrist sx    NEUROLOGICAL PROCEDURE UNLISTED  6/2014    Cerbral Angiogram    VASCULAR SURGERY PROCEDURE UNLIST      R carotid endartectomy with angioplasty        MEDS:  Current Outpatient Prescriptions   Medication Sig    clopidogrel (PLAVIX) 75 mg tab Take 1 Tab by mouth daily.  metoprolol succinate (TOPROL-XL) 25 mg XL tablet TAKE ONE TABLET BY MOUTH ONCE DAILY    pravastatin (PRAVACHOL) 80 mg tablet Take 80 mg by mouth nightly.  pantoprazole (PROTONIX) 40 mg tablet Take 40 mg by mouth daily.     topiramate (TOPAMAX) 50 mg tablet Take 1/2 tab every morning, take 1 tab at bedtime (Patient taking differently: Take 100 mg by mouth two (2) times a day. Take 1/2 tab every morning, take 1 tab at bedtime)    aspirin 81 mg chewable tablet Take 162 mg by mouth daily.  ALBUTEROL IN Take  by inhalation.  nitroglycerin (NITROSTAT) 0.4 mg SL tablet 1 Tab by SubLINGual route every five (5) minutes as needed for Chest Pain (up to 3 doses only). No current facility-administered medications for this visit. Allergies and Sensitivities:  Allergies   Allergen Reactions    Cat Dander Itching    Chlorhexidine Gluconate Itching    Dog Dander Itching    Lipitor [Atorvastatin] Other (comments)     dizziness    Seafood Swelling    Zocor [Simvastatin] Vertigo     Same as with lipitor, once on it for about two weeks experienced vertigo, stopped when he discontinued it       Family History:  Family History   Problem Relation Age of Onset    Heart Attack Father        Social History:  He  reports that he quit smoking about 3 years ago. His smoking use included Cigarettes. He has a 41.00 pack-year smoking history. He has never used smokeless tobacco.  He  reports that he drinks alcohol. Physical:  Visit Vitals    /82    Pulse (!) 58    Ht 5' 6\" (1.676 m)    Wt 76.2 kg (168 lb)    SpO2 98%    BMI 27.12 kg/m2         Exam:  Neck:  Supple, no JVD, no carotid bruits  CV:  Normal S1 and  S2, no murmurs, rubs, or gallops noted  Lungs:  Clear to ausculation throughout, no wheezes or rales  Abd:  Soft, non-tender, non-distended with good bowel sounds. No hepatosplenomegaly  Extremities:  No edema      Data:  EKG:     Read by Augustus Sauer, DO - Sinus bradycardia, rate 58.  Nonspecific inferolateral ST-T flattening.       LABS:  Lab Results   Component Value Date/Time    Sodium 142 08/02/2017 11:00 AM    Potassium 4.2 08/02/2017 11:00 AM    Chloride 107 08/02/2017 11:00 AM    CO2 23 08/02/2017 11:00 AM    Glucose 110 (H) 08/02/2017 11:00 AM    BUN 7 08/02/2017 11:00 AM    Creatinine 1.19 08/02/2017 11:00 AM     Lab Results   Component Value Date/Time    Cholesterol, total 165 04/24/2014 11:43 AM    HDL Cholesterol 55 04/24/2014 11:43 AM    LDL, calculated 89.8 04/24/2014 11:43 AM    Triglyceride 101 04/24/2014 11:43 AM    CHOL/HDL Ratio 3.0 04/24/2014 11:43 AM     Lab Results   Component Value Date/Time    ALT (SGPT) 40 05/05/2014 07:55 PM       Impression/Plan:  1. Recent TIA with history of 2 TIAs in 2017  2. CAD, history of IWMI in 2014  3. Hyperlipidemia, on pravastatin 80mg  4. PVD, s/p right CEA in 2014 (felt to be the culprit lesion for his CVAs)  5. History of CVA's x 2 in 2014    Mr. Sandy Bose was seen today for a post-hospital follow-up after a TIA. He was referred back to cardiology by neurology to discuss outpatient prolonged cardiac monitoring to evaluate for atrial fibrillation. He reports that in March 2017 and in either July or August 2017, he had TIAs. The one that occurred most recently seems to have been more intense when compared to the ones that occurred in 2017. During his ER visits and evaluations, it does not appear that atrial fibrillation has been discovered. He is anticoagulated with clopidogrel and ASA 81mg. Currently, he states that the residual effects from his CVAs in 2014 are at baseline (migraines, right foot drop, partial blindness in his left eye, vertigo, and right paresthesias). He offers no new neurological complaints and states that he has follow-up scheduled with neurology soon. He denies cardiac complaints of chest pain, shortness of breath, palpitations. His blood pressure is controlled, EKG shows sinus rhythm, lungs are clear and he exhibits no signs of volume overload. We discussed the possibility of arrhythmias as a contributing factor for his TIAs. Investigational methods such as event monitors and implantable loop recorders discussed and his questions were answered.   Will request that he wear a 30 day event monitor and if this does not reveal any arrhythmias, will then consider implantable loop recorder for longer term monitoring. I conferred options with Dr. Morris Garcia and he agrees with 30 day event monitor first.  I also discussed possible long-term coagulation with Coumadin or one of the novel anticoagulants with . Fay Olsen if atrial fibrillation is confirmed. He will follow-up with Dr. Lucrecia Bhat as scheduled and as needed. Ev Manzanares MSN, FNP-BC    Please note:  Portions of this chart were created with Dragon medical speech to text program.  Unrecognized errors may be present.

## 2018-04-16 ENCOUNTER — OFFICE VISIT (OUTPATIENT)
Dept: CARDIOLOGY CLINIC | Age: 58
End: 2018-04-16

## 2018-04-16 VITALS
DIASTOLIC BLOOD PRESSURE: 80 MMHG | WEIGHT: 162 LBS | OXYGEN SATURATION: 96 % | SYSTOLIC BLOOD PRESSURE: 130 MMHG | HEART RATE: 59 BPM | BODY MASS INDEX: 26.03 KG/M2 | HEIGHT: 66 IN

## 2018-04-16 DIAGNOSIS — Z98.890 S/P CAROTID ENDARTERECTOMY: ICD-10-CM

## 2018-04-16 DIAGNOSIS — E78.5 DYSLIPIDEMIA: ICD-10-CM

## 2018-04-16 DIAGNOSIS — I69.398 CVA, OLD, DISTURBANCES OF VISION: ICD-10-CM

## 2018-04-16 DIAGNOSIS — I25.10 CAD IN NATIVE ARTERY: Primary | ICD-10-CM

## 2018-04-16 DIAGNOSIS — H53.9 CVA, OLD, DISTURBANCES OF VISION: ICD-10-CM

## 2018-04-16 NOTE — PROGRESS NOTES
1. Have you been to the ER, urgent care clinic since your last visit? Hospitalized since your last visit? Yes,     2. Have you seen or consulted any other health care providers outside of the [address] [place] [address] since your last visit? Include any pap smears or colon screening.   No

## 2018-04-16 NOTE — PROGRESS NOTES
HISTORY OF PRESENT ILLNESS  Luis Ortiz is a 56 y.o. male. Hospital Follow Up   Pertinent negatives include no chest pain, no abdominal pain, no headaches and no shortness of breath. Dizziness   Pertinent negatives include no chest pain, no abdominal pain, no headaches and no shortness of breath. Patient presents for a followup office visit. He has a past medical history significant for single vessel coronary artery disease. He had an inferior wall myocardial infarction in April 2014 which was managed medically because of an ongoing stroke at the same time. He eventually underwent a cardiac catheterization which showed moderate nonobstructive disease in a dominant circumflex vessel. He underwent a stress test in September 2016 was showed a fixed inferior perfusion defect with ejection fraction of 53%, which was not significantly changed compared to his prior study from 2014. He also has a history of carotid artery disease, status post a right CEA in July 2014 which was felt to be the culprit lesion for his CVAs. He was a heavy smoker at that time but has since stopped smoking. He was recently seen by her nurse practitioner in the office 1 month ago for a post hospital visit. The patient was hospitalized at [facility] in [place], [place] for an apparent TIA. His workup there was unremarkable. He underwent a normal MRI of the brain, normal echocardiogram with negative bubble study. His carotid duplex scan showed a patent right ICA with moderate left ICA disease. He was then fitted with a 30 day event monitor last month which he is still wearing. He is nearing the completion of the 30 days and is yet to have any significant arrhythmias. He does have occasional atrial premature contractions. Since last visit, he is complaining of frequent migraine headaches and he is scheduled to follow-up with neurology for this.   He also has an annual follow-up visit with vascular surgery later this summer. He denies any new chest pain, no shortness of breath, no orthopnea, PND or leg swelling. No claudication. He does have occasional dizzy spells which tend to be more frequent when he is having headaches. Past Medical History:   Diagnosis Date    Asthma     [facility]     psoriasis, ulcerative colitis    CAD in native artery     inf STEMI (april 2014) managed medically due to concomittant stroke; cath performed few weeks later: mild LM, LAD, RCA; 55% AVLCx (dominant).  Colitis     [facility] cancer [facility]     Dyslipidemia     Heart attack ([facility]     Hx of transesophageal echocardiography ([facility]) 04/28/2014    EF 50%. Basal-mid inferior hypk. No LA appendage thrombus. No R-L atrial septal shunt by IV contrast.  Mild ALYCE. No cardioembolic source for stroke.  Panic attack     S/P cardiac catheterization 05/06/2014    LM patent. LAD (sm) mild. AV-CX 55% w/linear filling defect prox to apex suggesting intimal tear. RCA very sm, nondom.  S/P carotid endarterectomy     R CEA (July 2014)    Stroke [facility]) 4/23/14    Parasthesis right saide, blindness left eye      Current Outpatient Prescriptions   Medication Sig Dispense Refill    clopidogrel (PLAVIX) 75 mg tab Take 1 Tab by mouth daily. 90 Tab 3    metoprolol succinate (TOPROL-XL) 25 mg XL tablet TAKE ONE TABLET BY MOUTH ONCE DAILY 90 Tab 3    pravastatin (PRAVACHOL) 80 mg tablet Take 80 mg by mouth nightly.  pantoprazole (PROTONIX) 40 mg tablet Take 40 mg by mouth daily.  topiramate (TOPAMAX) 50 mg tablet Take 1/2 tab every morning, take 1 tab at bedtime (Patient taking differently: Take 100 mg by mouth two (2) times a day. Take 1/2 tab every morning, take 1 tab at bedtime) 50 tablet 5    aspirin 81 mg chewable tablet Take 162 mg by mouth daily.  ALBUTEROL IN Take  by inhalation.       nitroglycerin (NITROSTAT) 0.4 mg SL tablet 1 Tab by SubLINGual route every five (5) minutes as needed for Chest Pain (up to 3 doses only). 30 Tab 0       Allergies   Allergen Reactions    Cat Dander Itching    Chlorhexidine Gluconate Itching    Dog Dander Itching    Lipitor [Atorvastatin] Other (comments)     dizziness    Seafood Swelling    Zocor [Simvastatin] Vertigo     Same as with lipitor, once on it for about two weeks experienced vertigo, stopped when he discontinued it      Social History   Substance Use Topics    Smoking status: Former Smoker     Packs/day: 1.00     Years: 41.00     Types: Cigarettes     Quit date: 4/23/2014    Smokeless tobacco: Never Used    Alcohol use Yes      Comment: social--very light now          Review of Systems   Constitutional: Negative for chills, fever and weight loss. HENT: Negative for nosebleeds. Eyes: Negative for blurred vision and double vision. Respiratory: Negative for cough, shortness of breath and wheezing. Cardiovascular: Negative for chest pain, palpitations, orthopnea, claudication, leg swelling and PND. Gastrointestinal: Negative for abdominal pain, heartburn, nausea and vomiting. Genitourinary: Negative for dysuria and hematuria. Musculoskeletal: Negative for falls and myalgias. Skin: Negative for rash. Neurological: Positive for dizziness. Negative for focal weakness and headaches. Endo/Heme/Allergies: Does not bruise/bleed easily. Psychiatric/Behavioral: Negative for substance abuse. Visit Vitals    /80    Pulse (!) 59    Ht 5' 6\" (1.676 m)    Wt 73.5 kg (162 lb)    SpO2 96%    BMI 26.15 kg/m2      Physical Exam   Constitutional: He is oriented to person, place, and time. He appears well-developed and well-nourished. HENT:   Head: Normocephalic and atraumatic. Eyes: Conjunctivae are normal.   Neck: Neck supple. No JVD present. Carotid bruit is not present. Cardiovascular: Normal rate, regular rhythm, S1 normal, S2 normal and normal pulses. Exam reveals no gallop and no S3. No murmur heard.   Pulmonary/Chest: Breath sounds normal. He has no wheezes. He has no rales. Abdominal: Soft. Bowel sounds are normal. There is no tenderness. Musculoskeletal: He exhibits no edema. Neurological: He is alert and oriented to person, place, and time. Skin: Skin is warm and dry. EKG: Normal sinus rhythm, left axis deviation, borderline inferior Q waves, cannot exclude prior inferior wall myocardial infarction, nonspecific ST abnormality. No change compared to the previous EKG. 30 day event monitor. In progress. No significant arrhythmias thus far. ASSESSMENT and PLAN    Coronary artery disease. Patient had single vessel coronary disease on cardiac catheterization in 2014 With a moderate nonobstructive lesion found in his left circumflex, which is a dominant vessel. His last stress test was in September 2016 which demonstrated a fixed inferior wall perfusion defect consistent with prior infarct, but no ischemia. EF 53%. Patient remains on dual antiplatelet therapy with aspirin and Plavix, along with a low-dose beta-blocker and pravastatin. All of which I would continue. Dyslipidemia. Patient is currently tolerating pravastatin 80 mg daily. He did not tolerate either Lipitor or Crestor because of severe vertigo. I would prefer that his LDL around 70. However, this is a significant improvement to his lipids from a year prior. I would advise lifestyle modification, but if his LDL remains similar, would consider 1 of the new injectable PCSK9 inhibitors. Carotid artery disease. Status post right CEA in July 2014 for symptomatic severe disease in the setting of a CVA. Patient had a followup duplex scan in March 2018 which now showed moderate carotid disease of the left ICA and a patent right ICA. This was in the setting of a recurrent TIA. I recommended he follow-up with vascular surgery for this. History of recurrent CVA.   The patient does have residual visual field deficits and his high and some hemideficit in his upper extremity. No evidence of a cardioembolic event. Patient underwent a  thoracic echocardiogram with bubble study March 2018 which is normal.  He has now been wearing a 30 day event monitor which is yet to show any arrhythmias. My suspicion is that this may be related to his known cerebrovascular disease. Follow-up in 6 months, sooner if needed.

## 2018-04-16 NOTE — MR AVS SNAPSHOT
[address] [place] [address] [address] [place] Alberto [address] 
[phone] Patient: Kelsi Rachel MRN: [id number] Visit Information Date & Time Provider Department Dept. Phone Encounter #  
 4/16/2018  4:20 PM Frank Kellogg MD [facility] [address] [id number] Your Appointments 6/18/2018 10:00 AM  
PROCEDURE with [facility] 1 [facility] ([address] [place] [address]) Appt Note: cv knaak 1 year [address] [facility] Crystal Pitman [address] [facility] [address]  
[phone] [address] [facility] [place] [facility]  
  
    
 6/28/2018  9:00 AM  
Follow Up with [facility] ([address] [place] [address]) Appt Note: 1 YR CV [address] [place], [place] [address] [facility] [address]  
[phone] [address] [facility] [employer] [place]  [facility]  
  
    
 9/10/2018  9:20 AM  
Follow Up with Frank Kellogg MD  
[facility] ([address] [place] [address]) Appt Note: 1 year follow up [place] Alberto [id number] [phone] [address] [facility] [address] Upcoming Health Maintenance Date Due Hepatitis C Screening 1960 DTaP/Tdap/Td series (1 - Tdap) 11/3/1983 FOBT Q 1 YEAR AGE 50-75 11/3/2012 Influenza Age 5 to Adult 8/1/2017 Allergies as of 4/16/2018  Review Complete On: 3/8/2018 By: Vandy Simmonds, NP Severity Noted Reaction Type Reactions Cat Dander  05/22/2014    Itching Chlorhexidine Gluconate  07/07/2014    Itching Dog Dander  05/22/2014    Itching Lipitor [Atorvastatin]  06/19/2014    Other (comments)  
 dizziness Seafood  06/30/2014    Swelling Zocor [Simvastatin]  08/29/2014    Vertigo  Same as with lipitor, once on it for about two weeks experienced vertigo, stopped when he discontinued it Current Immunizations  Never Reviewed No immunizations on file. Not reviewed this visit You Were Diagnosed With   
  
 Codes Comments CAD in native artery    -  Primary ICD-10-CM: I25.10 ICD-9-CM: 414.01   
 CVA, old, disturbances of vision     ICD-10-CM: I69.398, H53.9 ICD-9-CM: 438.7 Dyslipidemia     ICD-10-CM: E78.5 ICD-9-CM: 272.4 S/P carotid endarterectomy     ICD-10-CM: X96.805 ICD-9-CM: V45.89 Vitals BP Pulse Height(growth percentile) Weight(growth percentile) SpO2 BMI  
 130/80 (!) 59 5' 6\" (1.676 m) 162 lb (73.5 kg) 96% 26.15 kg/m2 Smoking Status Former Smoker Vitals History BMI and BSA Data Body Mass Index Body Surface Area  
 26.15 kg/m 2 1.85 m 2 Preferred Pharmacy Pharmacy Name Phone 500 [address]. [phone] Your Updated Medication List  
  
   
This list is accurate as of 4/16/18  4:32 PM.  Always use your most recent med list.  
  
  
  
  
 ALBUTEROL IN Take  by inhalation. aspirin 81 mg chewable tablet Take 162 mg by mouth daily. clopidogrel 75 mg Tab Commonly known as:  PLAVIX Take 1 Tab by mouth daily. metoprolol succinate 25 mg XL tablet Commonly known as:  TOPROL-XL  
TAKE ONE TABLET BY MOUTH ONCE DAILY  
  
 nitroglycerin 0.4 mg SL tablet Commonly known as:  NITROSTAT  
1 Tab by SubLINGual route every five (5) minutes as needed for Chest Pain (up to 3 doses only). pantoprazole 40 mg tablet Commonly known as:  PROTONIX Take 40 mg by mouth daily. pravastatin 80 mg tablet Commonly known as:  PRAVACHOL Take 80 mg by mouth nightly. topiramate 50 mg tablet Commonly known as:  TOPAMAX Take 1/2 tab every morning, take 1 tab at bedtime We Performed the Following AMB POC EKG ROUTINE W/ 12 LEADS, INTER & REP [57389 CPT(R)] Please provide this summary of care documentation to your next provider. Your primary care clinician is listed as Fei Ariza. If you have any questions after today's visit, please call [phone].

## 2018-06-19 ENCOUNTER — OFFICE VISIT (OUTPATIENT)
Dept: VASCULAR SURGERY | Age: 58
End: 2018-06-19

## 2018-06-19 VITALS
WEIGHT: 162 LBS | RESPIRATION RATE: 16 BRPM | SYSTOLIC BLOOD PRESSURE: 130 MMHG | HEART RATE: 60 BPM | BODY MASS INDEX: 26.03 KG/M2 | HEIGHT: 66 IN | DIASTOLIC BLOOD PRESSURE: 84 MMHG

## 2018-06-19 DIAGNOSIS — I65.23 CAROTID STENOSIS, ASYMPTOMATIC, BILATERAL: Primary | ICD-10-CM

## 2018-06-19 DIAGNOSIS — Z98.890 H/O CAROTID ENDARTERECTOMY: ICD-10-CM

## 2018-06-19 DIAGNOSIS — G45.9 TRANSIENT CEREBRAL ISCHEMIA, UNSPECIFIED TYPE: ICD-10-CM

## 2018-06-19 NOTE — PROGRESS NOTES
Computers were down at time of patients visit so this is an incomplete note    Mr Pritesh Watkins was here now almost 4 years s/p a R CEA  This was done after he had suffered CVA       He has continued to follow with neurology as he has chronic migraines. He remains on plavix/asa. He did have STEMI at same time frame as the CVA. He follows with cardiology now on a yearly basis as well    He was hospitalized one year ago and a few months ago for what he was told were TIA's. He had cardiac work up which was negative. He has no further issues but is worried about possible recurrences again  There was concern that it could be recurrent from his carotids    However, today's results are not concerning.  CEA site was patent with no recurrent stenosis  He did have a mild increase in velocities of the L CEA but only about 50% stenosis      There could be intracranial disease or disease of aortic arch  This can be better evaluated with CTA head/neck, which it does not appear he has had done  Will arrange that and call with follow up recommendations

## 2018-06-19 NOTE — PROGRESS NOTES
1. Have you been to an emergency room or urgent care clinic since your last visit? no    Hospitalized since your last visit? If yes, where, when, and reason for visit?  no  2. Have you seen or consulted any other health care providers outside of the [facility] since your last visit including any procedures, health maintenance items.  If yes, where, when and reason for visit? no

## 2018-06-28 ENCOUNTER — HOSPITAL ENCOUNTER (OUTPATIENT)
Dept: CT IMAGING | Age: 58
Discharge: HOME | End: 2018-06-28
Attending: PHYSICIAN ASSISTANT
Payer: COMMERCIAL

## 2018-06-28 DIAGNOSIS — Z98.890 H/O CAROTID ENDARTERECTOMY: ICD-10-CM

## 2018-06-28 DIAGNOSIS — I65.23 CAROTID STENOSIS, ASYMPTOMATIC, BILATERAL: ICD-10-CM

## 2018-06-28 DIAGNOSIS — G45.9 TRANSIENT CEREBRAL ISCHEMIA, UNSPECIFIED TYPE: ICD-10-CM

## 2018-06-28 LAB — CREAT UR-MCNC: 1.2 MG/DL (ref 0.6–1.3)

## 2018-06-28 PROCEDURE — 70498 CT ANGIOGRAPHY NECK: CPT

## 2018-06-28 PROCEDURE — 74011636320 HC RX REV CODE- 636/320: Performed by: PHYSICIAN ASSISTANT

## 2018-06-28 PROCEDURE — 82565 ASSAY OF CREATININE: CPT

## 2018-06-28 RX ADMIN — IOPAMIDOL 75 ML: 755 INJECTION, SOLUTION INTRAVENOUS at 09:00

## 2018-09-10 ENCOUNTER — OFFICE VISIT (OUTPATIENT)
Dept: CARDIOLOGY CLINIC | Age: 58
End: 2018-09-10

## 2018-09-10 VITALS
SYSTOLIC BLOOD PRESSURE: 130 MMHG | OXYGEN SATURATION: 96 % | WEIGHT: 153 LBS | BODY MASS INDEX: 24.59 KG/M2 | DIASTOLIC BLOOD PRESSURE: 72 MMHG | HEART RATE: 56 BPM | HEIGHT: 66 IN

## 2018-09-10 DIAGNOSIS — I25.10 CAD IN NATIVE ARTERY: Primary | ICD-10-CM

## 2018-09-10 DIAGNOSIS — I69.398 CVA, OLD, DISTURBANCES OF VISION: ICD-10-CM

## 2018-09-10 DIAGNOSIS — E78.5 DYSLIPIDEMIA: ICD-10-CM

## 2018-09-10 DIAGNOSIS — H53.9 CVA, OLD, DISTURBANCES OF VISION: ICD-10-CM

## 2018-09-10 DIAGNOSIS — Z98.890 S/P CAROTID ENDARTERECTOMY: ICD-10-CM

## 2018-09-10 RX ORDER — TOPIRAMATE 100 MG/1
150 TABLET, FILM COATED ORAL 2 TIMES DAILY
COMMUNITY

## 2018-09-10 NOTE — PROGRESS NOTES
Ania Eric presents today for   Chief Complaint   Patient presents with    Hypertension     6 month follow up        Ania Parkerks preferred language for health care discussion is english/other. Is someone accompanying this pt? Self     Is the patient using any DME equipment during OV? No     Depression Screening:  PHQ over the last two weeks 6/19/2018   Little interest or pleasure in doing things Not at all   Feeling down, depressed, irritable, or hopeless Not at all   Total Score PHQ 2 0       Learning Assessment:  Learning Assessment 9/1/2017   PRIMARY LEARNER Patient   HIGHEST LEVEL OF EDUCATION - PRIMARY LEARNER  -   CO-LEARNER CAREGIVER -   PRIMARY LANGUAGE ENGLISH   LEARNER PREFERENCE PRIMARY DEMONSTRATION   ANSWERED BY Patient   RELATIONSHIP SELF       Abuse Screening:  No flowsheet data found. Fall Risk  No flowsheet data found. Pt currently taking Anticoagulant therapy? ASA 81mg, 1 tab bid     Coordination of Care  1. Have you been to the ER, urgent care clinic since your last visit? Hospitalized since your last visit? No     2. Have you seen or consulted any other health care providers outside of the [address] [place] [place] [address] since your last visit? Include any pap smears or colon screening.  No

## 2018-09-10 NOTE — MR AVS SNAPSHOT
[address] [place] [address] Xiao Rubin [address] 
[phone] Patient: Billie Michaels MRN: [id number] Visit Information Date & Time Provider Department Dept. Phone Encounter #  
 9/10/2018  9:20 AM Mika Mina MD [facility] [address] [id number] Upcoming Health Maintenance Date Due Hepatitis C Screening 1960 DTaP/Tdap/Td series (1 - Tdap) 11/3/1983 FOBT Q 1 YEAR AGE 50-75 11/3/2012 Influenza Age 5 to Adult 8/1/2018 Allergies as of 9/10/2018  Review Complete On: 6/28/2018 By: RT Mya Severity Noted Reaction Type Reactions Cat Dander  05/22/2014    Itching Chlorhexidine Gluconate  07/07/2014    Itching Dog Dander  05/22/2014    Itching Lipitor [Atorvastatin]  06/19/2014    Other (comments)  
 dizziness Seafood  06/30/2014    Swelling Zocor [Simvastatin]  08/29/2014    Vertigo Same as with lipitor, once on it for about two weeks experienced vertigo, stopped when he discontinued it Current Immunizations  Never Reviewed No immunizations on file. Not reviewed this visit You Were Diagnosed With   
  
 Codes Comments CAD in native artery    -  Primary ICD-10-CM: I25.10 ICD-9-CM: 414.01 Dyslipidemia     ICD-10-CM: E78.5 ICD-9-CM: 272.4   
 CVA, old, disturbances of vision     ICD-10-CM: I69.398, H53.9 ICD-9-CM: 438.7 S/P carotid endarterectomy     ICD-10-CM: R51.166 ICD-9-CM: V45.89 Vitals BP Pulse Height(growth percentile) Weight(growth percentile) SpO2 BMI  
 130/72 (!) 56 5' 6\" (1.676 m) 153 lb (69.4 kg) 96% 24.69 kg/m2 Smoking Status Former Smoker Vitals History BMI and BSA Data Body Mass Index Body Surface Area  
 24.69 kg/m 2 1.8 m 2 Preferred Pharmacy Pharmacy Name Phone [address]. [phone] Your Updated Medication List  
  
   
This list is accurate as of 9/10/18 10:06 AM.  Always use your most recent med list.  
  
  
  
  
 ALBUTEROL IN Take  by inhalation. aspirin 81 mg chewable tablet Take 162 mg by mouth daily. clopidogrel 75 mg Tab Commonly known as:  PLAVIX Take 1 Tab by mouth daily. metoprolol succinate 25 mg XL tablet Commonly known as:  TOPROL-XL  
TAKE ONE TABLET BY MOUTH ONCE DAILY  
  
 nitroglycerin 0.4 mg SL tablet Commonly known as:  NITROSTAT  
1 Tab by SubLINGual route every five (5) minutes as needed for Chest Pain (up to 3 doses only). pantoprazole 40 mg tablet Commonly known as:  PROTONIX Take 40 mg by mouth daily. pravastatin 80 mg tablet Commonly known as:  PRAVACHOL Take 80 mg by mouth nightly. TOPAMAX 100 mg tablet Generic drug:  topiramate Take 100 mg by mouth two (2) times a day. We Performed the Following AMB POC EKG ROUTINE W/ 12 LEADS, INTER & REP [21147 CPT(R)] Introducing [facility]! Harshad Meade introduces [employer] patient portal. Now you can access parts of your medical record, email your doctor's office, and request medication refills online. 1. In your internet browser, go to [email or web address] 2. Click on the First Time User? Click Here link in the Sign In box. You will see the New Member Sign Up page. 3. Enter your [employer] Access Code exactly as it appears below. You will not need to use this code after youve completed the sign-up process. If you do not sign up before the expiration date, you must request a new code. · [employer] Access Code: [email or web address] Expires: 9/17/2018  7:56 AM 
 
4. Enter the last four digits of your Social Security Number (xxxx) and Date of Birth (mm/dd/yyyy) as indicated and click Submit. You will be taken to the next sign-up page. 5. Create a A[employer] ID. This will be your [employer] login ID and cannot be changed, so think of one that is secure and easy to remember. 6. Create a [employer] password. You can change your password at any time. 7. Enter your Password Reset Question and Answer. This can be used at a later time if you forget your password. 8. Enter your e-mail address. You will receive e-mail notification when new information is available in [address] 9. Click Sign Up. You can now view and download portions of your medical record. 10. Click the Download Summary menu link to download a portable copy of your medical information. If you have questions, please visit the Frequently Asked Questions section of the [employer] website. Remember, [employer] is NOT to be used for urgent needs. For medical emergencies, dial 911. Now available from your iPhone and Android! Please provide this summary of care documentation to your next provider. Your primary care clinician is listed as Fei Ariza. If you have any questions after today's visit, please call [phone].

## 2018-09-10 NOTE — PROGRESS NOTES
HISTORY OF PRESENT ILLNESS  Horace Polanco is a 56 y.o. male. Hypertension     Dizziness       Patient presents for a followup office visit. He has a past medical history significant for single vessel coronary artery disease. He had an inferior wall myocardial infarction in April 2014 which was managed medically because of an ongoing stroke at the same time. He eventually underwent a cardiac catheterization which showed moderate nonobstructive disease in a dominant circumflex vessel. He underwent a stress test in September 2016 was showed a fixed inferior perfusion defect with ejection fraction of 53%, which was not significantly changed compared to his prior study from 2014. He also has a history of carotid artery disease, status post a right CEA in July 2014 which was felt to be the culprit lesion for his CVAs. He was a heavy smoker at that time but has since stopped smoking. The patient was hospitalized at [facility] in [place], [place] for an apparent TIA. His workup there was unremarkable. He underwent a normal MRI of the brain, normal echocardiogram with negative bubble study. His carotid duplex scan showed a patent right ICA with moderate left ICA disease. He was then fitted with a 30 day event monitor which did not demonstrate any significant arrhythmias. He was last seen in our office 4-5 months ago. Since last visit he been feeling well. He still has occasional dizzy spells associated with his headaches his follow-up with his neurologist for this. The symptoms have not significantly changed over the past 3-6 months. He denies any new chest pain, shortness of breath, no orthopnea, PND or leg swelling.     Past Medical History:   Diagnosis Date    Asthma     Autoimmune disease ([facility] [address]     psoriasis, ulcerative colitis    CAD in native artery     inf STEMI (april 2014) managed medically due to concomittant stroke; cath performed few weeks later: mild LM, LAD, RCA; 55% AVLCx (dominant).  Colitis     Colon cancer ([facility]     Dyslipidemia     Heart attack ([facility] [facility]     Hx of transesophageal echocardiography ([facility]) 04/28/2014    EF 50%. Basal-mid inferior hypk. No LA appendage thrombus. No R-L atrial septal shunt by IV contrast.  Mild ALYCE. No cardioembolic source for stroke.  Panic attack     S/P cardiac catheterization 05/06/2014    LM patent. LAD (sm) mild. AV-CX 55% w/linear filling defect prox to apex suggesting intimal tear. RCA very sm, nondom.  S/P carotid endarterectomy     R CEA (July 2014)    Stroke [facility]) 4/23/14    Parasthesis right saide, blindness left eye      Current Outpatient Prescriptions   Medication Sig Dispense Refill    topiramate (TOPAMAX) 100 mg tablet Take 100 mg by mouth two (2) times a day.  clopidogrel (PLAVIX) 75 mg tab Take 1 Tab by mouth daily. 90 Tab 3    metoprolol succinate (TOPROL-XL) 25 mg XL tablet TAKE ONE TABLET BY MOUTH ONCE DAILY 90 Tab 3    pravastatin (PRAVACHOL) 80 mg tablet Take 80 mg by mouth nightly.  pantoprazole (PROTONIX) 40 mg tablet Take 40 mg by mouth daily.  aspirin 81 mg chewable tablet Take 162 mg by mouth daily.  ALBUTEROL IN Take  by inhalation.  nitroglycerin (NITROSTAT) 0.4 mg SL tablet 1 Tab by SubLINGual route every five (5) minutes as needed for Chest Pain (up to 3 doses only).  30 Tab 0       Allergies   Allergen Reactions    Cat Dander Itching    Chlorhexidine Gluconate Itching    Dog Dander Itching    Lipitor [Atorvastatin] Other (comments)     dizziness    Seafood Swelling    Zocor [Simvastatin] Vertigo     Same as with lipitor, once on it for about two weeks experienced vertigo, stopped when he discontinued it      Social History   Substance Use Topics    Smoking status: Former Smoker     Packs/day: 1.00     Years: 41.00     Types: Cigarettes     Quit date: 4/23/2014    Smokeless tobacco: Never Used    Alcohol use Yes      Comment: social--very light now          Review of Systems   Constitutional: Negative for chills, fever and weight loss. HENT: Negative for nosebleeds. Eyes: Negative for blurred vision and double vision. Respiratory: Negative for cough and wheezing. Cardiovascular: Negative for palpitations, orthopnea, claudication, leg swelling and PND. Gastrointestinal: Negative for heartburn, nausea and vomiting. Genitourinary: Negative for dysuria and hematuria. Musculoskeletal: Negative for falls and myalgias. Skin: Negative for rash. Neurological: Positive for dizziness. Negative for focal weakness. Endo/Heme/Allergies: Does not bruise/bleed easily. Psychiatric/Behavioral: Negative for substance abuse. Visit Vitals    /72    Pulse (!) 56    Ht 5' 6\" (1.676 m)    Wt 69.4 kg (153 lb)    SpO2 96%    BMI 24.69 kg/m2      Physical Exam   Constitutional: He is oriented to person, place, and time. He appears well-developed and well-nourished. HENT:   Head: Normocephalic and atraumatic. Eyes: Conjunctivae are normal.   Neck: Neck supple. No JVD present. Carotid bruit is not present. Cardiovascular: Normal rate, regular rhythm, S1 normal, S2 normal and normal pulses. Exam reveals no gallop and no S3. No murmur heard. Pulmonary/Chest: Breath sounds normal. He has no wheezes. He has no rales. Abdominal: Soft. Bowel sounds are normal. There is no tenderness. Musculoskeletal: He exhibits no edema. Neurological: He is alert and oriented to person, place, and time. Skin: Skin is warm and dry. EKG: Normal sinus rhythm, left axis deviation, borderline inferior Q waves, cannot exclude prior inferior wall myocardial infarction, nonspecific ST abnormality. No change compared to the previous EKG. ASSESSMENT and PLAN    Coronary artery disease. Patient had single vessel coronary disease on cardiac catheterization in 2014 With a moderate nonobstructive lesion found in his left circumflex, which is a dominant vessel.   His last stress test was in September 2016 which demonstrated a fixed inferior wall perfusion defect consistent with prior infarct, but no ischemia. EF 53%. Patient remains on dual antiplatelet therapy with aspirin and Plavix, along with a low-dose beta-blocker and pravastatin. All of which I would continue. Dyslipidemia. Patient is currently tolerating pravastatin 80 mg daily. He did not tolerate either Lipitor or Crestor because of severe vertigo. I would prefer that his LDL around 70. However, this is a significant improvement to his lipids from a year prior. I would advise lifestyle modification, but if his LDL remains similar, would consider 1 of the new injectable PCSK9 inhibitors. Carotid artery disease. Status post right CEA in July 2014 for symptomatic severe disease in the setting of a CVA. Patient had a followup duplex scan in March 2018 which now showed moderate carotid disease of the left ICA and a patent right ICA. Patient had a follow-up carotid scan in June of this year and is following up regularly with vascular surgery. Anastacio Sink History of recurrent CVA. The patient does have residual visual field deficits and his high and some hemideficit in his upper extremity. No evidence of a cardioembolic event. Patient underwent a  thoracic echocardiogram with bubble study March 2018 which is normal.  He completed a 30 day event monitor which did not show any significant arrhythmias. Follow-up in 6 months, sooner if needed.

## 2018-12-31 RX ORDER — METOPROLOL SUCCINATE 25 MG/1
TABLET, EXTENDED RELEASE ORAL
Qty: 30 TAB | Refills: 5 | Status: SHIPPED | OUTPATIENT
Start: 2018-12-31 | End: 2019-07-10 | Stop reason: SDUPTHER

## 2019-03-13 RX ORDER — CLOPIDOGREL BISULFATE 75 MG/1
75 TABLET ORAL DAILY
Qty: 90 TAB | Refills: 3 | Status: SHIPPED | OUTPATIENT
Start: 2019-03-13 | End: 2020-04-20 | Stop reason: SDUPTHER

## 2019-04-15 ENCOUNTER — OFFICE VISIT (OUTPATIENT)
Dept: CARDIOLOGY CLINIC | Age: 59
End: 2019-04-15

## 2019-04-15 VITALS
HEART RATE: 52 BPM | BODY MASS INDEX: 23.14 KG/M2 | DIASTOLIC BLOOD PRESSURE: 70 MMHG | WEIGHT: 144 LBS | HEIGHT: 66 IN | OXYGEN SATURATION: 98 % | SYSTOLIC BLOOD PRESSURE: 108 MMHG

## 2019-04-15 DIAGNOSIS — Z98.890 S/P CAROTID ENDARTERECTOMY: ICD-10-CM

## 2019-04-15 DIAGNOSIS — I25.10 CAD IN NATIVE ARTERY: Primary | ICD-10-CM

## 2019-04-15 DIAGNOSIS — I69.398 CVA, OLD, DISTURBANCES OF VISION: ICD-10-CM

## 2019-04-15 DIAGNOSIS — E78.5 DYSLIPIDEMIA: ICD-10-CM

## 2019-04-15 DIAGNOSIS — H53.9 CVA, OLD, DISTURBANCES OF VISION: ICD-10-CM

## 2019-04-15 NOTE — PROGRESS NOTES
HISTORY OF PRESENT ILLNESS  Amy Pinto is a 66 y.o. male. Hypertension   Pertinent negatives include no chest pain, no abdominal pain, no headaches and no shortness of breath. Patient presents for a followup office visit. He has a past medical history significant for single vessel coronary artery disease. He had an inferior wall myocardial infarction in April 2014 which was managed medically because of an ongoing stroke at the same time. He eventually underwent a cardiac catheterization which showed moderate nonobstructive disease in a dominant circumflex vessel. He underwent a stress test in September 2016 was showed a fixed inferior perfusion defect with ejection fraction of 53%, which was not significantly changed compared to his prior study from 2014. He also has a history of carotid artery disease, status post a right CEA in July 2014 which was felt to be the culprit lesion for his CVAs. He was a heavy smoker at that time but has since stopped smoking. The patient was hospitalized at [facility] in [place], [place] for an apparent TIA. His workup there was unremarkable. He underwent a normal MRI of the brain, normal echocardiogram with negative bubble study. His carotid duplex scan showed a patent right ICA with moderate left ICA disease. He was then fitted with a 30 day event monitor which did not demonstrate any significant arrhythmias. He was last seen in our office 7 months ago. Since last visit he been feeling well. No new dizziness or syncope. No heart palpitations, shortness of breath, chest pain leg swelling, orthopnea or PND. Past Medical History:   Diagnosis Date    Asthma     Autoimmune disease ([facility] [address]     psoriasis, ulcerative colitis    CAD in native artery     inf STEMI (april 2014) managed medically due to concomittant stroke; cath performed few weeks later: mild LM, LAD, RCA; 55% AVLCx (dominant).     Colitis     Colon cancer ([facility] [address]     Dyslipidemia     Heart attack ([address]     Hx of transesophageal echocardiography ([facility]) 2014    EF 50%. Basal-mid inferior hypk. No LA appendage thrombus. No R-L atrial septal shunt by IV contrast.  Mild ALYCE. No cardioembolic source for stroke.  Panic attack     S/P cardiac catheterization 2014    LM patent. LAD (sm) mild. AV-CX 55% w/linear filling defect prox to apex suggesting intimal tear. RCA very sm, nondom.  S/P carotid endarterectomy     R CEA (2014)    Stroke [facility]) 14    Parasthesis right saide, blindness left eye      Current Outpatient Medications   Medication Sig Dispense Refill    clopidogrel (PLAVIX) 75 mg tab Take 1 Tab by mouth daily. 90 Tab 3    metoprolol succinate (TOPROL-XL) 25 mg XL tablet TAKE ONE TABLET BY MOUTH ONCE DAILY 30 Tab 5    topiramate (TOPAMAX) 100 mg tablet Take 100 mg by mouth two (2) times a day.  pravastatin (PRAVACHOL) 80 mg tablet Take 80 mg by mouth nightly.  pantoprazole (PROTONIX) 40 mg tablet Take 40 mg by mouth daily.  aspirin 81 mg chewable tablet Take 162 mg by mouth daily.  ALBUTEROL IN Take  by inhalation.  nitroglycerin (NITROSTAT) 0.4 mg SL tablet 1 Tab by SubLINGual route every five (5) minutes as needed for Chest Pain (up to 3 doses only).  30 Tab 0       Allergies   Allergen Reactions    Cat Dander Itching    Chlorhexidine Gluconate Itching    Dog Dander Itching    Lipitor [Atorvastatin] Other (comments)     dizziness    Seafood Swelling    Zocor [Simvastatin] Vertigo     Same as with lipitor, once on it for about two weeks experienced vertigo, stopped when he discontinued it      Social History     Tobacco Use    Smoking status: Former Smoker     Packs/day: 1.00     Years: 41.00     Pack years: 41.00     Types: Cigarettes     Last attempt to quit: 2014     Years since quittin.9    Smokeless tobacco: Never Used   Substance Use Topics    Alcohol use: Yes     Comment: social--very light now   Cloud Drug use: No          Review of Systems   Constitutional: Negative for chills, fever and weight loss. HENT: Negative for nosebleeds. Eyes: Negative for blurred vision and double vision. Respiratory: Negative for cough, shortness of breath and wheezing. Cardiovascular: Negative for chest pain, palpitations, orthopnea, claudication, leg swelling and PND. Gastrointestinal: Negative for abdominal pain, heartburn, nausea and vomiting. Genitourinary: Negative for dysuria and hematuria. Musculoskeletal: Negative for falls and myalgias. Skin: Negative for rash. Neurological: Negative for dizziness, focal weakness and headaches. Endo/Heme/Allergies: Does not bruise/bleed easily. Psychiatric/Behavioral: Negative for substance abuse. Visit Vitals  /70   Pulse (!) 52   Ht 5' 6\" (1.676 m)   Wt 65.3 kg (144 lb)   SpO2 98%   BMI 23.24 kg/m²      Physical Exam   Constitutional: He is oriented to person, place, and time. He appears well-developed and well-nourished. HENT:   Head: Normocephalic and atraumatic. Eyes: Conjunctivae are normal.   Neck: Neck supple. No JVD present. Carotid bruit is not present. Cardiovascular: Regular rhythm, S1 normal, S2 normal and normal pulses. Bradycardia present. Exam reveals no gallop and no S3. No murmur heard. Pulmonary/Chest: Breath sounds normal. He has no wheezes. He has no rales. Abdominal: Soft. Bowel sounds are normal. There is no tenderness. Musculoskeletal: He exhibits no edema. Neurological: He is alert and oriented to person, place, and time. Skin: Skin is warm and dry. EKG: Sinus bradycardia, left axis deviation, inferior Q waves, cannot exclude prior inferior wall myocardial infarction, nonspecific ST abnormality. No change compared to the previous EKG. ASSESSMENT and PLAN    Coronary artery disease.   Patient had single vessel coronary disease on cardiac catheterization in 2014 with a moderate nonobstructive lesion found in his left circumflex, which is a dominant vessel. His last stress test was in September 2016 which demonstrated a fixed inferior wall perfusion defect consistent with prior infarct, but no ischemia. EF 53%. Patient remains on dual antiplatelet therapy with aspirin and Plavix, along with a low-dose beta-blocker and pravastatin. I have recommended a follow-up nuclear stress test in September of this year. I will continue his current medical therapy. Dyslipidemia. Patient is currently tolerating pravastatin 80 mg daily. He did not tolerate either Lipitor or Crestor because of severe vertigo. I would prefer that his LDL around 70. I would advise lifestyle modification, but if his LDL remains similar, would consider 1 of the new injectable PCSK9 inhibitors. Carotid artery disease. Status post right CEA in July 2014 for symptomatic severe disease in the setting of a CVA. Patient had a followup duplex scan in March 2018 which now showed moderate carotid disease of the left ICA and a patent right ICA. Patient follows up regularly with vascular surgery with annual carotid duplex scans. History of recurrent CVA. The patient does have residual visual field deficits and his high and some hemideficit in his upper extremity. No evidence of a cardioembolic event. Patient underwent a  thoracic echocardiogram with bubble study March 2018 which is normal.  He completed a 30 day event monitor which did not show any significant arrhythmias. Follow-up in 6 months, sooner if needed.

## 2019-04-15 NOTE — PROGRESS NOTES
Leonardo Lau presents today for   Chief Complaint   Patient presents with    Coronary Artery Disease     6 month follow up - no cardiac complaints        Mleanie Mack preferred language for health care discussion is english/other. Is someone accompanying this pt? no    Is the patient using any DME equipment during [address]? no    Depression Screening:  3 most recent PHQ Screens 4/15/2019   Little interest or pleasure in doing things Not at all   Feeling down, depressed, irritable, or hopeless Not at all   Total Score PHQ 2 0       Learning Assessment:  Learning Assessment 4/15/2019   PRIMARY LEARNER Patient   HIGHEST LEVEL OF EDUCATION - PRIMARY LEARNER  -   CO-LEARNER CAREGIVER -   PRIMARY LANGUAGE ENGLISH   LEARNER PREFERENCE PRIMARY DEMONSTRATION   ANSWERED BY Patient   RELATIONSHIP SELF       Abuse Screening:  No flowsheet data found. Fall Risk  No flowsheet data found. Pt currently taking Anticoagulant therapy? ASA 81mg every day & Plavix     Coordination of Care:  1. Have you been to the ER, urgent care clinic since your last visit? Hospitalized since your last visit? no    2. Have you seen or consulted any other health care providers outside of the [address] [place] [address] since your last visit? Include any pap smears or colon screening.  no

## 2019-06-13 DIAGNOSIS — I65.23 CAROTID STENOSIS, ASYMPTOMATIC, BILATERAL: Primary | ICD-10-CM

## 2019-06-13 DIAGNOSIS — Z98.890 H/O CAROTID ENDARTERECTOMY: ICD-10-CM

## 2019-07-10 RX ORDER — METOPROLOL SUCCINATE 25 MG/1
TABLET, EXTENDED RELEASE ORAL
Qty: 30 TAB | Refills: 5 | Status: SHIPPED | OUTPATIENT
Start: 2019-07-10 | End: 2020-03-30

## 2019-07-30 ENCOUNTER — OFFICE VISIT (OUTPATIENT)
Dept: VASCULAR SURGERY | Age: 59
End: 2019-07-30

## 2019-07-30 VITALS
DIASTOLIC BLOOD PRESSURE: 80 MMHG | HEART RATE: 62 BPM | BODY MASS INDEX: 23.14 KG/M2 | SYSTOLIC BLOOD PRESSURE: 142 MMHG | RESPIRATION RATE: 14 BRPM | HEIGHT: 66 IN | WEIGHT: 144 LBS

## 2019-07-30 DIAGNOSIS — I65.23 CAROTID STENOSIS, ASYMPTOMATIC, BILATERAL: Primary | ICD-10-CM

## 2019-07-30 DIAGNOSIS — Z98.890 H/O CAROTID ENDARTERECTOMY: ICD-10-CM

## 2019-07-30 DIAGNOSIS — I65.23 BILATERAL CAROTID ARTERY STENOSIS: ICD-10-CM

## 2019-07-30 RX ORDER — CITALOPRAM 10 MG/1
TABLET ORAL
Refills: 3 | COMMUNITY
Start: 2019-07-10 | End: 2022-09-01

## 2019-07-30 NOTE — PROGRESS NOTES
1. Have you been to an emergency room or urgent care clinic since your last visit?   no  Hospitalized since your last visit? If yes, where, when, and reason for visit? Yes, [facility]  2. Have you seen or consulted any other health care providers outside of the [facility] since your last visit including any procedures, health maintenance items. If yes, where, when and reason for visit?

## 2019-07-30 NOTE — PROGRESS NOTES
[facility]    Chief Complaint   Patient presents with    Carotid Artery Stenosis       History and Physical    27-year-old with history of symptomatic right carotid. He had a right carotid endarterectomy with good success. Intermittently he has had 2 or 3 8 TIAs initially thought of his TIAs in the last 2 years. He has been seen by neurology and ruled out for TIAs. He is worn a Holter monitor with no A. fib. He has had CTA and Doppler showing no critical lesions. He is being treated as a migraine disorder. With his Toprol, DAPT, and statin has had no recurrence of symptoms. Past Medical History:   Diagnosis Date    Asthma     Autoimmune disease ([facility] [address]     psoriasis, ulcerative colitis    CAD in native artery     inf STEMI (april 2014) managed medically due to concomittant stroke; cath performed few weeks later: mild LM, LAD, RCA; 55% AVLCx (dominant).  Colitis     Colon cancer ([facility] [facility]     Dyslipidemia     Heart attack ([facility] [address] [facility]     Hx of transesophageal echocardiography ([facility]) 04/28/2014    EF 50%. Basal-mid inferior hypk. No LA appendage thrombus. No R-L atrial septal shunt by IV contrast.  Mild ALYCE. No cardioembolic source for stroke.  Panic attack     S/P cardiac catheterization 05/06/2014    LM patent. LAD (sm) mild. AV-CX 55% w/linear filling defect prox to apex suggesting intimal tear. RCA very sm, nondom.       S/P carotid endarterectomy     R CEA (July 2014)    Stroke [facility]) 4/23/14    Parasthesis right saide, blindness left eye     Patient Active Problem List   Diagnosis Code    JESSICA occlusion then recanalization with residual moderate stenosis [id number]    Hemianopia, homonymous, left H53.462    History of ST elevation myocardial infarction (STEMI) I25.2    Vertigo R42    Headache(784.0) R51    Paresthesia of right arm R20.2    Migraine, unspecified, with intractable migraine, so stated, without mention of status migrainosus G43.919    CAD in native artery I25.10    S/P carotid endarterectomy Z98.890    Colitis K52.9    Dyslipidemia E78.5    CVA, old, disturbances of vision I69.398, H53.9     Past Surgical History:   Procedure Laterality Date    HX APPENDECTOMY      HX HEART CATHETERIZATION      HX HERNIA REPAIR      HX ORTHOPAEDIC Left     Left wrist sx    NEUROLOGICAL PROCEDURE UNLISTED  6/2014    Cerbral Angiogram    VASCULAR SURGERY PROCEDURE UNLIST      R carotid endartectomy with angioplasty      Current Outpatient Medications   Medication Sig Dispense Refill    citalopram (CELEXA) 10 mg tablet TAKE 1 TABLET BY MOUTH ONCE DAILY  3    metoprolol succinate (TOPROL-XL) 25 mg XL tablet TAKE ONE TABLET BY MOUTH ONCE DAILY 30 Tab 5    clopidogrel (PLAVIX) 75 mg tab Take 1 Tab by mouth daily. 90 Tab 3    topiramate (TOPAMAX) 100 mg tablet Take 100 mg by mouth two (2) times a day.  pravastatin (PRAVACHOL) 80 mg tablet Take 80 mg by mouth nightly.  pantoprazole (PROTONIX) 40 mg tablet Take 40 mg by mouth daily.  aspirin 81 mg chewable tablet Take 162 mg by mouth daily.  ALBUTEROL IN Take  by inhalation.  nitroglycerin (NITROSTAT) 0.4 mg SL tablet 1 Tab by SubLINGual route every five (5) minutes as needed for Chest Pain (up to 3 doses only). 30 Tab 0     Allergies   Allergen Reactions    Cat Dander Itching    Chlorhexidine Gluconate Itching    Dog Dander Itching    Lipitor [Atorvastatin] Other (comments)     dizziness    Seafood Swelling    Zocor [Simvastatin] Vertigo     Same as with lipitor, once on it for about two weeks experienced vertigo, stopped when he discontinued it       Review of Systems    A full review of systems was completed times ten organ systems and was deemed negative unless otherwise mentioned in the HPI.     Physical   Visit Vitals  /80 (BP 1 Location: Left arm, BP Patient Position: Sitting)   Pulse 62   Resp 14   Ht 5' 6\" (1.676 m)   Wt 144 lb (65.3 kg)   BMI 23.24 kg/m²       Healthy-appearing 56  Head is normocephalic  Neck no JVD well-healed incision on the right  Good cosmetic effect  Chest is clear  Cardiac regular  Abdomen soft nontender  Extremities range of motion strength are equal  No signs of arterial insufficiency  Doppler study shows a widely patent carotid endarterectomy on the right, moderate stenosis at most on the left  I reviewed the images from a CTA year ago, but right side patch and carotids are widely patent, at most a moderate lesion on the left    Impression/Plan:     ICD-10-CM ICD-9-CM    1. Carotid stenosis, asymptomatic, bilateral I65.23 433.10 DUPLEX CAROTID BILATERAL     433.30    2. H/O carotid endarterectomy Z98.890 V45.89 DUPLEX CAROTID BILATERAL   3. Bilateral carotid artery stenosis I65.23 433.10 DUPLEX CAROTID BILATERAL     433.30      Continue with best medical therapy  Follow-up in 1 year with a Doppler. Orders Placed This Encounter    citalopram (CELEXA) 10 mg tablet       Follow-up and Dispositions    · Return in about 1 year (around 7/30/2020). Mary Beyer MD    PLEASE NOTE:  This document has been produced using voice recognition software. Unrecognized errors in transcription may be present.

## 2019-07-30 NOTE — LETTER
7/30/19 Patient: Lebron Cai YOB: 1960 Date of Visit: 7/30/2019 Yoselin Moreno MD 
[id number] [place] [address] [place] [address] VIA Facsimile: [phone] Dear Yoselin Moreno MD, Thank you for referring Mr. Marah Lundberg to [address] [place] [address] for evaluation. My notes for this consultation are attached. If you have questions, please do not hesitate to call me. I look forward to following your patient along with you. Sincerely, Hanh Chong MD

## 2019-10-07 ENCOUNTER — HOSPITAL ENCOUNTER (OUTPATIENT)
Dept: NON INVASIVE DIAGNOSTICS | Age: 59
Discharge: HOME | End: 2019-10-07
Attending: INTERNAL MEDICINE
Payer: COMMERCIAL

## 2019-10-07 VITALS
HEIGHT: 66 IN | WEIGHT: 145 LBS | BODY MASS INDEX: 23.3 KG/M2 | SYSTOLIC BLOOD PRESSURE: 162 MMHG | DIASTOLIC BLOOD PRESSURE: 96 MMHG

## 2019-10-07 DIAGNOSIS — I25.10 CAD (CORONARY ARTERY DISEASE): ICD-10-CM

## 2019-10-07 LAB
STRESS BASELINE HR: 55 BPM
STRESS ESTIMATED WORKLOAD: 7 METS
STRESS EXERCISE DUR MIN: NORMAL
STRESS PEAK DIAS BP: 96 MMHG
STRESS PEAK SYS BP: 170 MMHG
STRESS PERCENT HR ACHIEVED: 70 %
STRESS POST PEAK HR: 114 BPM
STRESS RATE PRESSURE PRODUCT: NORMAL BPM*MMHG
STRESS TARGET HR: 164 BPM

## 2019-10-07 PROCEDURE — 74011250636 HC RX REV CODE- 250/636: Performed by: INTERNAL MEDICINE

## 2019-10-07 PROCEDURE — 93017 CV STRESS TEST TRACING ONLY: CPT

## 2019-10-07 RX ORDER — SODIUM CHLORIDE 9 MG/ML
250 INJECTION, SOLUTION INTRAVENOUS ONCE
Status: DISCONTINUED | OUTPATIENT
Start: 2019-10-07 | End: 2019-10-07 | Stop reason: CLARIF

## 2019-10-07 RX ORDER — SODIUM CHLORIDE 9 MG/ML
250 INJECTION, SOLUTION INTRAVENOUS ONCE
Status: COMPLETED | OUTPATIENT
Start: 2019-10-07 | End: 2019-10-07

## 2019-10-07 RX ADMIN — REGADENOSON 0.4 MG: 0.08 INJECTION, SOLUTION INTRAVENOUS at 10:10

## 2019-10-07 RX ADMIN — SODIUM CHLORIDE 250 ML: 900 INJECTION, SOLUTION INTRAVENOUS at 10:10

## 2019-10-07 NOTE — PROGRESS NOTES
Per your last note\" Coronary artery disease. Patient had single vessel coronary disease on cardiac catheterization in 2014 with a moderate nonobstructive lesion found in his left circumflex, which is a dominant vessel. His last stress test was in September 2016 which demonstrated a fixed inferior wall perfusion defect consistent with prior infarct, but no ischemia. EF 53%. Patient remains on dual antiplatelet therapy with aspirin and Plavix, along with a low-dose beta-blocker and pravastatin. I have recommended a follow-up nuclear stress test in September of this year.   I will continue his current

## 2019-10-07 NOTE — PROGRESS NOTES
Patient was given 10.21 milliCuries of 99mTc-Sestamibi for the resting images. Patient was also given 33.0 milliCuries of 99mTc-Sestamibi for the stress images. Patient walked on treadmill during nuclear stress test, but could not reach target heart rate. Switched to pharmacological stress test.  Injected with 0.4mg of Lexiscan. Patient's armband was discarded and shredded.

## 2019-10-08 ENCOUNTER — TELEPHONE (OUTPATIENT)
Dept: CARDIOLOGY CLINIC | Age: 59
End: 2019-10-08

## 2019-10-08 NOTE — TELEPHONE ENCOUNTER
----- Message from Mariza Mendes MD sent at 10/8/2019  9:28 AM EDT -----  Please let the patient know his stress test was unchanged from prior study. Still low risk test.  ----- Message -----  From: Frankie Kruse LPN  Sent: [id number]/5/3538   2:58 PM EDT  To: Mariza Mendes MD    Per your last note\" Coronary artery disease. Patient had single vessel coronary disease on cardiac catheterization in 2014 with a moderate nonobstructive lesion found in his left circumflex, which is a dominant vessel. His last stress test was in September 2016 which demonstrated a fixed inferior wall perfusion defect consistent with prior infarct, but no ischemia. EF 53%. Patient remains on dual antiplatelet therapy with aspirin and Plavix, along with a low-dose beta-blocker and pravastatin. I have recommended a follow-up nuclear stress test in September of this year.   I will continue his current

## 2019-10-14 ENCOUNTER — OFFICE VISIT (OUTPATIENT)
Dept: CARDIOLOGY CLINIC | Age: 59
End: 2019-10-14

## 2019-10-14 VITALS
WEIGHT: 156.4 LBS | SYSTOLIC BLOOD PRESSURE: 150 MMHG | HEIGHT: 66 IN | BODY MASS INDEX: 25.13 KG/M2 | OXYGEN SATURATION: 98 % | HEART RATE: 53 BPM | DIASTOLIC BLOOD PRESSURE: 90 MMHG

## 2019-10-14 DIAGNOSIS — R03.0 BORDERLINE HYPERTENSION: ICD-10-CM

## 2019-10-14 DIAGNOSIS — Z98.890 S/P CAROTID ENDARTERECTOMY: ICD-10-CM

## 2019-10-14 DIAGNOSIS — I25.2 HISTORY OF ST ELEVATION MYOCARDIAL INFARCTION (STEMI): ICD-10-CM

## 2019-10-14 DIAGNOSIS — I25.10 CAD IN NATIVE ARTERY: Primary | ICD-10-CM

## 2019-10-14 DIAGNOSIS — E78.5 DYSLIPIDEMIA: ICD-10-CM

## 2019-10-14 RX ORDER — BUPROPION HYDROCHLORIDE 300 MG/1
300 TABLET ORAL
COMMUNITY

## 2019-10-14 NOTE — PROGRESS NOTES
Shannan Holt presents today for   Chief Complaint   Patient presents with    Coronary Artery Disease     6 month f/u    Cholesterol Problem    Heart Problem     hx       Ruth Oh Nuccio preferred language for health care discussion is english/other. Is someone accompanying this pt? no    Is the patient using any DME equipment during [facility]? no    Depression Screening:  3 most recent PHQ Screens 7/30/2019   Little interest or pleasure in doing things Several days   Feeling down, depressed, irritable, or hopeless Several days   Total Score PHQ 2 2       Learning Assessment:  Learning Assessment 7/30/2019   PRIMARY LEARNER Patient   HIGHEST LEVEL OF EDUCATION - PRIMARY LEARNER  -   CO-LEARNER CAREGIVER -   PRIMARY LANGUAGE ENGLISH   LEARNER PREFERENCE PRIMARY LISTENING   ANSWERED BY patient   RELATIONSHIP SELF       Abuse Screening:  No flowsheet data found. Fall Risk  No flowsheet data found. Pt currently taking Anticoagulant therapy? no    Coordination of Care:  1. Have you been to the ER, urgent care clinic since your last visit? Hospitalized since your last visit? no    2. Have you seen or consulted any other health care providers outside of the [address] [place] [address] since your last visit? Include any pap smears or colon screening.  no

## 2019-10-14 NOTE — PROGRESS NOTES
HISTORY OF PRESENT ILLNESS  Dana Cloud is a 66 y.o. male. Hypertension     Heart Problem       Patient presents for a followup office visit. He has a past medical history significant for single vessel coronary artery disease. He had an inferior wall myocardial infarction in April 2014 which was managed medically because of an ongoing stroke at the same time. He eventually underwent a cardiac catheterization which showed moderate nonobstructive disease in a dominant circumflex vessel. He underwent a stress test in September 2016 was showed a fixed inferior perfusion defect with ejection fraction of 53%, which was not significantly changed compared to his prior study from 2014. He also has a history of carotid artery disease, status post a right CEA in July 2014 which was felt to be the culprit lesion for his CVAs. He was a heavy smoker at that time but has since stopped smoking. The patient was hospitalized at [facility] in [place], [place] for an apparent TIA. His workup there was unremarkable. He underwent a normal MRI of the brain, normal echocardiogram with negative bubble study. His carotid duplex scan showed a patent right ICA with moderate left ICA disease. He was then fitted with a 30 day event monitor which did not demonstrate any significant arrhythmias. Patient underwent a repeat carotid duplex scan in July 2019 which was unchanged compared to his prior study. He recently underwent a follow-up nuclear stress test in October 2019. He exercised for 6 minutes, but could only achieved 70% of maximum predicted heart rate, so was switched over to a chemical stress test.  His perfusion pattern showed a small fixed inferior perfusion defect, with no reversible perfusion imaging abnormalities, EF 65%. Overall this is unchanged compared to his prior studies. He was last seen in our office 6 months ago. Since that time he states his been feeling well.   He denies any chest pain, shortness of breath or leg swelling. No major change in his activity level. Past Medical History:   Diagnosis Date    Asthma     Autoimmune disease ([facility]     psoriasis, ulcerative colitis    CAD in native artery     inf STEMI (april 2014) managed medically due to concomittant stroke; cath performed few weeks later: mild LM, LAD, RCA; 55% AVLCx (dominant).  Colitis     Colon cancer ([facility]     Dyslipidemia     [facility]     Hx of transesophageal echocardiography ([facility]) 04/28/2014    EF 50%. Basal-mid inferior hypk. No LA appendage thrombus. No R-L atrial septal shunt by IV contrast.  Mild ALYCE. No cardioembolic source for stroke.  Panic attack     S/P cardiac catheterization 05/06/2014    LM patent. LAD (sm) mild. AV-CX 55% w/linear filling defect prox to apex suggesting intimal tear. RCA very sm, nondom.  S/P carotid endarterectomy     R CEA (July 2014)    Stroke [facility]) 4/23/14    Parasthesis right saide, blindness left eye      Current Outpatient Medications   Medication Sig Dispense Refill    buPROPion XL (WELLBUTRIN XL) 300 mg XL tablet Take 300 mg by mouth every morning.  citalopram (CELEXA) 10 mg tablet TAKE 1 TABLET BY MOUTH ONCE DAILY  3    metoprolol succinate (TOPROL-XL) 25 mg XL tablet TAKE ONE TABLET BY MOUTH ONCE DAILY 30 Tab 5    clopidogrel (PLAVIX) 75 mg tab Take 1 Tab by mouth daily. 90 Tab 3    topiramate (TOPAMAX) 100 mg tablet Take 150 mg by mouth two (2) times a day.  pravastatin (PRAVACHOL) 80 mg tablet Take 80 mg by mouth nightly.  pantoprazole (PROTONIX) 40 mg tablet Take 40 mg by mouth daily.  aspirin 81 mg chewable tablet Take 162 mg by mouth daily.  nitroglycerin (NITROSTAT) 0.4 mg SL tablet 1 Tab by SubLINGual route every five (5) minutes as needed for Chest Pain (up to 3 doses only).  30 Tab 0       Allergies   Allergen Reactions    Cat Dander Itching    Chlorhexidine Gluconate Itching    Dog Dander Itching    Lipitor [Atorvastatin] Other (comments)     dizziness    Seafood Swelling    Zocor [Simvastatin] Vertigo     Same as with lipitor, once on it for about two weeks experienced vertigo, stopped when he discontinued it      Social History     Tobacco Use    Smoking status: Former Smoker     Packs/day: 1.00     Years: 41.00     Pack years: 41.00     Types: Cigarettes     Last attempt to quit: 2014     Years since quittin.4    Smokeless tobacco: Never Used   Substance Use Topics    Alcohol use: Yes     Comment: social--very light now    Drug use: No          Review of Systems   Constitutional: Negative for chills, fever and weight loss. HENT: Negative for nosebleeds. Eyes: Negative for blurred vision and double vision. Respiratory: Negative for cough and wheezing. Cardiovascular: Negative for palpitations, orthopnea, claudication, leg swelling and PND. Gastrointestinal: Negative for heartburn, nausea and vomiting. Genitourinary: Negative for dysuria and hematuria. Musculoskeletal: Negative for falls and myalgias. Skin: Negative for rash. Neurological: Negative for dizziness and focal weakness. Endo/Heme/Allergies: Does not bruise/bleed easily. Psychiatric/Behavioral: Negative for substance abuse. Visit Vitals  /90 (BP 1 Location: Right arm, BP Patient Position: Sitting)   Pulse (!) 53   Ht 5' 6\" (1.676 m)   Wt 70.9 kg (156 lb 6.4 oz)   SpO2 98%   BMI 25.24 kg/m²      Physical Exam   Constitutional: He is oriented to person, place, and time. He appears well-developed and well-nourished. HENT:   Head: Normocephalic and atraumatic. Eyes: Conjunctivae are normal.   Neck: Neck supple. No JVD present. Carotid bruit is not present. Cardiovascular: Regular rhythm, S1 normal, S2 normal and normal pulses. Bradycardia present. Exam reveals no gallop and no S3. No murmur heard. Pulmonary/Chest: Breath sounds normal. He has no wheezes. He has no rales. Abdominal: Soft.  Bowel sounds are normal. There is no tenderness. Musculoskeletal: He exhibits no edema. Neurological: He is alert and oriented to person, place, and time. Skin: Skin is warm and dry. EKG: Sinus bradycardia, left axis deviation, inferior Q waves, cannot exclude prior inferior wall myocardial infarction, nonspecific ST abnormality. No change compared to the previous EKG. ASSESSMENT and PLAN    Coronary artery disease. Patient had single vessel coronary disease on cardiac catheterization in 2014 with a moderate nonobstructive lesion found in his left circumflex, which is a dominant vessel. His last nuclear stress test, recently completed in October 2019 demonstrated a fixed inferior wall perfusion defect consistent with prior infarct, but no ischemia. EF 65%. This was relatively unchanged compared to his prior study. Patient remains on dual antiplatelet therapy with aspirin and Plavix, along with a low-dose beta-blocker and pravastatin. No new symptoms concerning for angina. Continue his current medical regimen. Dyslipidemia. Patient is currently tolerating pravastatin 80 mg daily. He did not tolerate either Lipitor or Crestor because of severe vertigo. I would prefer that his LDL around 70. I would advise lifestyle modification, but if his LDL remains similar, would consider one of the new injectable PCSK9 inhibitors. Carotid artery disease. Status post right CEA in July 2014 for symptomatic severe disease in the setting of a CVA. Patient had a followup duplex scan in July 2019 which now showed moderate carotid disease of the left ICA and a patent right ICA. Patient follows up regularly with vascular surgery with annual carotid duplex scans. History of recurrent CVA. The patient does have residual visual field deficits and his high and some hemideficit in his upper extremity. No evidence of a cardioembolic event. He will likely need to remain on long-term dual antiplatelet therapy.     Follow-up in 6 months, sooner if needed.

## 2020-03-30 RX ORDER — METOPROLOL SUCCINATE 25 MG/1
TABLET, EXTENDED RELEASE ORAL
Qty: 30 TAB | Refills: 5 | Status: SHIPPED | OUTPATIENT
Start: 2020-03-30 | End: 2020-10-27

## 2020-04-20 RX ORDER — CLOPIDOGREL BISULFATE 75 MG/1
75 TABLET ORAL DAILY
Qty: 90 TAB | Refills: 3 | Status: SHIPPED | OUTPATIENT
Start: 2020-04-20 | End: 2020-11-05 | Stop reason: SDUPTHER

## 2020-10-16 ENCOUNTER — OFFICE VISIT (OUTPATIENT)
Dept: VASCULAR SURGERY | Age: 60
End: 2020-10-16
Payer: COMMERCIAL

## 2020-10-16 VITALS
SYSTOLIC BLOOD PRESSURE: 120 MMHG | HEIGHT: 66 IN | DIASTOLIC BLOOD PRESSURE: 76 MMHG | WEIGHT: 156 LBS | HEART RATE: 50 BPM | BODY MASS INDEX: 25.07 KG/M2 | RESPIRATION RATE: 15 BRPM | OXYGEN SATURATION: 96 %

## 2020-10-16 DIAGNOSIS — Z98.890 H/O CAROTID ENDARTERECTOMY: Primary | ICD-10-CM

## 2020-10-16 DIAGNOSIS — I65.23 CAROTID STENOSIS, ASYMPTOMATIC, BILATERAL: ICD-10-CM

## 2020-10-16 PROCEDURE — 99213 OFFICE O/P EST LOW 20 MIN: CPT | Performed by: SURGERY

## 2020-10-16 NOTE — PROGRESS NOTES
Carlos Mckeon    Chief Complaint   Patient presents with    Carotid Artery Stenosis       History and Physical    64year-old following up today regarding his carotids. About 6 years ago and a carotid neurectomy at that time for symptomatic carotid. Said no consequences since then. He has some moderate stenosis on the left which will follow. He is on his antiplatelet and statin. Tells me is done well since I seen him last no new problems. No chest pain no shortness of breath no TIAs no amaurosis    Past Medical History:   Diagnosis Date    Asthma     Autoimmune disease ([facility]     psoriasis, ulcerative colitis    CAD in native artery     inf STEMI (april 2014) managed medically due to concomittant stroke; cath performed few weeks later: mild LM, LAD, RCA; 55% AVLCx (dominant).  Colitis     Colon cancer ([facility]     Dyslipidemia     [facility]     Hx of transesophageal echocardiography ([facility]) 04/28/2014    EF 50%. Basal-mid inferior hypk. No LA appendage thrombus. No R-L atrial septal shunt by IV contrast.  Mild ALYCE. No cardioembolic source for stroke.  Panic attack     S/P cardiac catheterization 05/06/2014    LM patent. LAD (sm) mild. AV-CX 55% w/linear filling defect prox to apex suggesting intimal tear. RCA very sm, nondom.       S/P carotid endarterectomy     R CEA (July 2014)    Stroke [facility]) 4/23/14    Parasthesis right saide, blindness left eye     Patient Active Problem List   Diagnosis Code    JESSICA occlusion then recanalization with residual moderate stenosis [id number]    Hemianopia, homonymous, left H53.462    History of ST elevation myocardial infarction (STEMI) I25.2    Vertigo R42    Headache(784.0) R51    Paresthesia of right arm R20.2    Migraine, unspecified, with intractable migraine, so stated, without mention of status migrainosus G43.919    CAD in native artery I25.10    S/P carotid endarterectomy Z98.890    Colitis K52.9    Dyslipidemia E78.5    CVA, old, disturbances of vision I69.398, H53.9    Borderline hypertension R03.0     Past Surgical History:   Procedure Laterality Date    HX APPENDECTOMY      HX HEART CATHETERIZATION      HX HERNIA REPAIR      HX ORTHOPAEDIC Left     Left wrist sx    NEUROLOGICAL PROCEDURE UNLISTED  6/2014    Cerbral Angiogram    VASCULAR SURGERY PROCEDURE UNLIST      R carotid endartectomy with angioplasty      Current Outpatient Medications   Medication Sig Dispense Refill    clopidogreL (PLAVIX) 75 mg tab Take 1 tablet by mouth once daily 90 Tab 3    clopidogreL (PLAVIX) 75 mg tab Take 1 Tab by mouth daily. 90 Tab 3    metoprolol succinate (TOPROL-XL) 25 mg XL tablet Take 1 tablet by mouth once daily 30 Tab 5    buPROPion XL (WELLBUTRIN XL) 300 mg XL tablet Take 300 mg by mouth every morning.  citalopram (CELEXA) 10 mg tablet TAKE 1 TABLET BY MOUTH ONCE DAILY  3    topiramate (TOPAMAX) 100 mg tablet Take 150 mg by mouth two (2) times a day.  pravastatin (PRAVACHOL) 80 mg tablet Take 80 mg by mouth nightly.  pantoprazole (PROTONIX) 40 mg tablet Take 40 mg by mouth daily.  aspirin 81 mg chewable tablet Take 162 mg by mouth daily.  nitroglycerin (NITROSTAT) 0.4 mg SL tablet 1 Tab by SubLINGual route every five (5) minutes as needed for Chest Pain (up to 3 doses only). 30 Tab 0     Allergies   Allergen Reactions    Cat Dander Itching    Chlorhexidine Gluconate Itching    Dog Dander Itching    Lipitor [Atorvastatin] Other (comments)     dizziness    Seafood Swelling    Zocor [Simvastatin] Vertigo     Same as with lipitor, once on it for about two weeks experienced vertigo, stopped when he discontinued it       Review of Systems    A full review of systems was completed times ten organ systems and was deemed negative unless otherwise mentioned in the HPI.     Physical   Visit Vitals  /76 (BP 1 Location: Left arm, BP Patient Position: Sitting)   Pulse (!) 50   Resp 15   Ht 5' 6\" (1.676 m)   Wt 156 lb (70.8 kg)   SpO2 96%   BMI 25.18 kg/m²       Healthy-appearing 64year-old no distress  Head is normocephalic  Neck no JVD  Chest is clear  Cardiac regular  Abdomen soft flat nontender  Range of motion strength of extremities is equal  No signs of acute arterial insufficiency  Carotid Doppler reveals a patent endarterectomy on the right, moderate stenosis similar to last exam on the left    Impression/Plan:     ICD-10-CM ICD-9-CM    1. H/O carotid endarterectomy  Z98.890 V45.89    2. Carotid stenosis, asymptomatic, bilateral  I65.23 433.10 DUPLEX CAROTID BILATERAL     433.30      Continue best medical therapy  We will see him back in 1 year regarding his moderate stenosis on the left    Follow-up and Dispositions    · Return in about 1 year (around 10/16/2021). Griffin Ho MD    PLEASE NOTE:  This document has been produced using voice recognition software. Unrecognized errors in transcription may be present.

## 2020-10-16 NOTE — PROGRESS NOTES
1. Have you been to an emergency room or urgent care clinic since your last visit?   no  Hospitalized since your last visit? If yes, where, when, and reason for visit?   no  2. Have you seen or consulted any other health care providers outside of the [facility] since your last visit including any procedures, health maintenance items.  If yes, where, when and reason for visit?   no

## 2020-10-27 RX ORDER — METOPROLOL SUCCINATE 25 MG/1
TABLET, EXTENDED RELEASE ORAL
Qty: 30 TAB | Refills: 0 | Status: SHIPPED | OUTPATIENT
Start: 2020-10-27 | End: 2020-10-27 | Stop reason: SDUPTHER

## 2020-10-28 RX ORDER — METOPROLOL SUCCINATE 25 MG/1
25 TABLET, EXTENDED RELEASE ORAL DAILY
Qty: 30 TAB | Refills: 11 | Status: SHIPPED | OUTPATIENT
Start: 2020-10-28 | End: 2020-11-05 | Stop reason: SDUPTHER

## 2020-10-28 NOTE — TELEPHONE ENCOUNTER
[address] [facility] Office Note  Chief Complaint:   Patient presents with:  Medication Follow-Up      HPI:   This is a 78year old male coming in for follow up of DM2, anxiety, hyperlipidemia, HTN, hypothyroidism and COPD.     1.  DM2 - The Lm on vm for patient to give office a call to go over results. Atherosclerosis of coronary artery    • Atherosclerotic heart disease 9/27/2007    bifemoral atherosclerotic iliac disease   • CAD (coronary artery disease)    • Cancer [facility])     prostate   • Cerumen impaction 5/26/05    right   • Congestive heart disease RDI 65 SaO2 87% CPAP 10 [employer]   • Urinary retention 11/05/2007   • UTI (urinary tract infection) 3/1/05   • Visual impairment      Past Surgical History:   Procedure Laterality Date   • ANGIOGRAM     • CARDIAC CATHERTERIZATION (PBP)  09/28/2007    left hea • gabapentin 300 MG Oral Cap TAKE 1 CAPSULE BY MOUTH DAILY in the am 270 capsule 1   • CLOPIDOGREL BISULFATE 75 MG Oral Tab TAKE 1 TABLET DAILY 90 tablet 3   • Metoprolol Succinate ER 50 MG Oral Tablet 24 Hr Take 1 tablet (50 mg total) by mouth once clarisa pressure or chest discomfort. No palpitations or edema. Denies any dyspnea on exertion or at rest  RESPIRATORY:  Denies shortness of breath, + chronic cough  GASTROINTESTINAL:  Denies any abdominal pain, nausea, vomiting.   NEUROLOGICAL:  Denies headache, Encourage low carb diet  -  Eye referral provided at last OV  -  F/u in 3 months    2.  Essential hypertension with goal blood pressure less than 140/90  -  Controlled  -  Continue present mgmt  -  Check renal function  -  Monitor BP at home  - COMP METABOL result of today.      Problem List:  Patient Active Problem List:     Malignant neoplasm of prostate ([facility] [address]     Essential hypertension, benign     DM type 2 (diabetes mellitus, type 2) ([facility] [address]     Coronary atherosclerosis     BRITNEY (obstructive sleep apnea) LOVE, DO

## 2020-11-05 ENCOUNTER — DOCUMENTATION ONLY (OUTPATIENT)
Dept: CARDIOLOGY CLINIC | Age: 60
End: 2020-11-05

## 2020-11-05 ENCOUNTER — OFFICE VISIT (OUTPATIENT)
Dept: CARDIOLOGY CLINIC | Age: 60
End: 2020-11-05
Payer: COMMERCIAL

## 2020-11-05 VITALS
WEIGHT: 160 LBS | DIASTOLIC BLOOD PRESSURE: 82 MMHG | HEART RATE: 65 BPM | SYSTOLIC BLOOD PRESSURE: 130 MMHG | HEIGHT: 66 IN | OXYGEN SATURATION: 98 % | BODY MASS INDEX: 25.71 KG/M2

## 2020-11-05 DIAGNOSIS — R03.0 BORDERLINE HYPERTENSION: ICD-10-CM

## 2020-11-05 DIAGNOSIS — I69.398 CVA, OLD, DISTURBANCES OF VISION: ICD-10-CM

## 2020-11-05 DIAGNOSIS — I25.10 CAD IN NATIVE ARTERY: Primary | ICD-10-CM

## 2020-11-05 DIAGNOSIS — I25.2 HISTORY OF ST ELEVATION MYOCARDIAL INFARCTION (STEMI): ICD-10-CM

## 2020-11-05 DIAGNOSIS — H53.9 CVA, OLD, DISTURBANCES OF VISION: ICD-10-CM

## 2020-11-05 DIAGNOSIS — Z98.890 S/P CAROTID ENDARTERECTOMY: ICD-10-CM

## 2020-11-05 DIAGNOSIS — E78.5 DYSLIPIDEMIA: ICD-10-CM

## 2020-11-05 PROCEDURE — 99214 OFFICE O/P EST MOD 30 MIN: CPT | Performed by: INTERNAL MEDICINE

## 2020-11-05 PROCEDURE — 93000 ELECTROCARDIOGRAM COMPLETE: CPT | Performed by: INTERNAL MEDICINE

## 2020-11-05 RX ORDER — METOPROLOL SUCCINATE 25 MG/1
25 TABLET, EXTENDED RELEASE ORAL DAILY
Qty: 30 TAB | Refills: 11 | Status: SHIPPED | OUTPATIENT
Start: 2020-11-05 | End: 2021-11-10

## 2020-11-05 NOTE — PROGRESS NOTES
HISTORY OF PRESENT ILLNESS  Katheryn Cabrera is a 64 y.o. male. Follow-up   Pertinent negatives include no chest pain, no abdominal pain, no headaches and no shortness of breath. Patient presents for an overdue followup office visit. He has a past medical history significant for single vessel coronary artery disease. He had an inferior wall myocardial infarction in April 2014 which was managed medically because of an ongoing stroke at the same time. He eventually underwent a cardiac catheterization which showed moderate nonobstructive disease in a dominant circumflex vessel. He underwent a stress test in September 2016 was showed a fixed inferior perfusion defect with ejection fraction of 53%, which was not significantly changed compared to his prior study from 2014. He also has a history of carotid artery disease, status post a right CEA in July 2014 which was felt to be the culprit lesion for his CVAs. He was a heavy smoker at that time but has since stopped smoking. The patient was hospitalized at [facility] in [place], [place] for an apparent TIA. His workup there was unremarkable. He underwent a normal MRI of the brain, normal echocardiogram with negative bubble study. His carotid duplex scan showed a patent right ICA with moderate left ICA disease. He was then fitted with a 30 day event monitor which did not demonstrate any significant arrhythmias. Patient underwent a repeat carotid duplex scan in July 2019 which was unchanged compared to his prior study. He last underwent a follow-up nuclear stress test in October 2019. He exercised for 6 minutes, but could only achieved 70% of maximum predicted heart rate, so was switched over to a chemical stress test.  His perfusion pattern showed a small fixed inferior perfusion defect, with no reversible perfusion imaging abnormalities, EF 65%. Patient was last seen in our office a little over a year ago.   Since last visit, he states he has been feeling relatively well. He denies any exertional dyspnea or chest tightness. No major change in his activity tolerance, though he admits he does not exercise regularly. He does walk his dog throughout the week without any difficulty. He denies any leg swelling, orthopnea, or PND. No heart palpitations, severe dizzy spells or claudication. Past Medical History:   Diagnosis Date    Asthma     Autoimmune disease ([facility]     psoriasis, ulcerative colitis    CAD in native artery     inf STEMI (april 2014) managed medically due to concomittant stroke; cath performed few weeks later: mild LM, LAD, RCA; 55% AVLCx (dominant).  Colitis     Colon cancer ([facility]     Dyslipidemia     [facility]     Hx of transesophageal echocardiography ([facility]) 04/28/2014    EF 50%. Basal-mid inferior hypk. No LA appendage thrombus. No R-L atrial septal shunt by IV contrast.  Mild ALYCE. No cardioembolic source for stroke.  Panic attack     S/P cardiac catheterization 05/06/2014    LM patent. LAD (sm) mild. AV-CX 55% w/linear filling defect prox to apex suggesting intimal tear. RCA very sm, nondom.  S/P carotid endarterectomy     R CEA (July 2014)    Stroke [facility]) 4/23/14    Parasthesis right saide, blindness left eye      Current Outpatient Medications   Medication Sig Dispense Refill    metoprolol succinate (TOPROL-XL) 25 mg XL tablet Take 1 Tab by mouth daily. 30 Tab 11    clopidogreL (PLAVIX) 75 mg tab Take 1 tablet by mouth once daily 90 Tab 3    buPROPion XL (WELLBUTRIN XL) 300 mg XL tablet Take 300 mg by mouth every morning.  citalopram (CELEXA) 10 mg tablet TAKE 1 TABLET BY MOUTH ONCE DAILY  3    topiramate (TOPAMAX) 100 mg tablet Take 150 mg by mouth two (2) times a day.  pravastatin (PRAVACHOL) 80 mg tablet Take 80 mg by mouth nightly.  pantoprazole (PROTONIX) 40 mg tablet Take 40 mg by mouth daily.  aspirin 81 mg chewable tablet Take 162 mg by mouth daily.       nitroglycerin (NITROSTAT) 0.4 mg SL tablet 1 Tab by SubLINGual route every five (5) minutes as needed for Chest Pain (up to 3 doses only). 30 Tab 0       Allergies   Allergen Reactions    Cat Dander Itching    Chlorhexidine Gluconate Itching    Dog Dander Itching    Lipitor [Atorvastatin] Other (comments)     dizziness    Seafood Swelling    Zocor [Simvastatin] Vertigo     Same as with lipitor, once on it for about two weeks experienced vertigo, stopped when he discontinued it      Social History     Tobacco Use    Smoking status: Former Smoker     Packs/day: 1.00     Years: 41.00     Pack years: 41.00     Types: Cigarettes     Last attempt to quit: 2014     Years since quittin.5    Smokeless tobacco: Never Used   Substance Use Topics    Alcohol use: Yes     Comment: social--very light now    Drug use: No          Review of Systems   Constitutional: Negative for chills, fever and weight loss. HENT: Negative for nosebleeds. Eyes: Negative for blurred vision and double vision. Respiratory: Negative for cough, shortness of breath and wheezing. Cardiovascular: Negative for chest pain, palpitations, orthopnea, claudication, leg swelling and PND. Gastrointestinal: Negative for abdominal pain, heartburn, nausea and vomiting. Genitourinary: Negative for dysuria and hematuria. Musculoskeletal: Negative for falls and myalgias. Skin: Negative for rash. Neurological: Negative for dizziness, focal weakness and headaches. Endo/Heme/Allergies: Does not bruise/bleed easily. Psychiatric/Behavioral: Negative for substance abuse. Visit Vitals  /82 (BP 1 Location: Left arm, BP Patient Position: Sitting)   Pulse 65   Ht 5' 6\" (1.676 m)   Wt 72.6 kg (160 lb)   SpO2 98%   BMI 25.82 kg/m²      Physical Exam   Constitutional: He is oriented to person, place, and time. He appears well-developed and well-nourished. HENT:   Head: Normocephalic and atraumatic.    Eyes: Conjunctivae are normal.   Neck: Neck supple. No JVD present. Carotid bruit is not present. Cardiovascular: Normal rate, regular rhythm, S1 normal, S2 normal and normal pulses. Exam reveals no gallop and no S3. No murmur heard. Pulmonary/Chest: Breath sounds normal. He has no wheezes. He has no rales. Abdominal: Soft. Bowel sounds are normal. There is no abdominal tenderness. Musculoskeletal:         General: No edema. Neurological: He is alert and oriented to person, place, and time. Skin: Skin is warm and dry. EKG: Normal sinus rhythm, left axis deviation, inferior Q waves, cannot exclude prior inferior wall myocardial infarction, nonspecific ST abnormality. Compared to the previous EKG, no significant interval change. ASSESSMENT and PLAN    Coronary artery disease. Patient was found to have single vessel coronary disease on cardiac catheterization in 2014 with a moderate nonobstructive lesion found in his left circumflex, which is a dominant vessel. Patient has been treated medically since. His last nuclear stress test, recently completed in October 2019 demonstrated a fixed inferior wall perfusion defect consistent with prior infarct, but no ischemia. EF 65%. This was relatively unchanged compared to his prior study. Patient remains on dual antiplatelet therapy with aspirin and Plavix, along with a low-dose beta-blocker and pravastatin. No new symptoms concerning for angina. I would continue his current cardiac regiment. Dyslipidemia. Patient is currently tolerating pravastatin 80 mg daily. He did not tolerate either Lipitor or Crestor because of severe vertigo. I would prefer that his LDL around 70. I have recommended a copy of his most recent fasting lipid profile which is checked regularly by his PCP. He may be a candidate for one of the newer nonstatin lipid-lowering therapies. Carotid artery disease.   Status post right CEA in July 2014 for symptomatic severe disease in the setting of a CVA. Patient had a followup duplex scan in July 2019 which now showed moderate carotid disease of the left ICA and a patent right ICA. Patient follows up regularly with vascular surgery with annual carotid duplex scans. History of recurrent CVA. The patient does have residual visual field deficits and his high and some hemideficit in his upper extremity. No evidence of a cardioembolic event. He will likely need to remain on long-term dual antiplatelet therapy. Follow-up in 6 months, sooner if needed.

## 2020-11-05 NOTE — PROGRESS NOTES
Message left with Dr. Shanthi Juárez office to have patient's labs faxed to our office for Dr. Yvonne Cho.

## 2020-11-05 NOTE — PROGRESS NOTES
Sangita Rogers presents today for   Chief Complaint   Patient presents with    Follow-up     1 year follow up        Sangita Rogers preferred language for health care discussion is english/other. Is someone accompanying this pt? no    Is the patient using any DME equipment during [address]? no    Depression Screening:  3 most recent PHQ Screens 11/5/2020   Little interest or pleasure in doing things Not at all   Feeling down, depressed, irritable, or hopeless Not at all   Total Score PHQ 2 0       Learning Assessment:  Learning Assessment 10/16/2020   PRIMARY LEARNER Patient   HIGHEST LEVEL OF EDUCATION - PRIMARY LEARNER  -   CO-LEARNER CAREGIVER -   PRIMARY LANGUAGE ENGLISH   LEARNER PREFERENCE PRIMARY LISTENING   ANSWERED BY patient   RELATIONSHIP SELF       Abuse Screening:  Abuse Screening Questionnaire 11/5/2020   Do you ever feel afraid of your partner? N   Are you in a relationship with someone who physically or mentally threatens you? N   Is it safe for you to go home? Y       Fall Risk  Fall Risk Assessment, last 12 mths 11/5/2020   Able to walk? Yes   Fall in past 12 months? No       Pt currently taking Anticoagulant therapy? ASA 162mg every day     Coordination of Care:  1. Have you been to the ER, urgent care clinic since your last visit? Hospitalized since your last visit? no    2. Have you seen or consulted any other health care providers outside of the [address] [place] [address] since your last visit? Include any pap smears or colon screening.  no

## 2021-06-02 RX ORDER — CLOPIDOGREL BISULFATE 75 MG/1
TABLET ORAL
Qty: 90 TABLET | Refills: 3 | Status: SHIPPED | OUTPATIENT
Start: 2021-06-02 | End: 2022-06-14

## 2021-06-02 NOTE — TELEPHONE ENCOUNTER
Patient called and requested a refill of medication. Patient is scheduled for in office visit on 06/21. Patient stated that he ran out of medication today. Pharmacy on file verified. Relevant Problems   No relevant active problems       Anesthetic History               Review of Systems / Medical History  Patient summary reviewed and pertinent labs reviewed    Pulmonary            Asthma : well controlled       Neuro/Psych              Cardiovascular                  Exercise tolerance: >4 METS     GI/Hepatic/Renal                Endo/Other             Other Findings              Physical Exam    Airway  Mallampati: I  TM Distance: > 6 cm  Neck ROM: normal range of motion   Mouth opening: Normal     Cardiovascular  Regular rate and rhythm,  S1 and S2 normal,  no murmur, click, rub, or gallop  Rhythm: regular  Rate: normal         Dental  No notable dental hx       Pulmonary  Breath sounds clear to auscultation               Abdominal         Other Findings            Anesthetic Plan    ASA: 2  Anesthesia type: general            Anesthetic plan and risks discussed with: Patient      Will skip gabapentin and use very  Low dose ketamine.

## 2021-06-21 ENCOUNTER — OFFICE VISIT (OUTPATIENT)
Dept: CARDIOLOGY CLINIC | Age: 61
End: 2021-06-21

## 2021-06-21 VITALS
SYSTOLIC BLOOD PRESSURE: 152 MMHG | HEART RATE: 67 BPM | HEIGHT: 66 IN | BODY MASS INDEX: 25.55 KG/M2 | OXYGEN SATURATION: 98 % | DIASTOLIC BLOOD PRESSURE: 90 MMHG | WEIGHT: 159 LBS

## 2021-06-21 DIAGNOSIS — I25.2 HISTORY OF ST ELEVATION MYOCARDIAL INFARCTION (STEMI): ICD-10-CM

## 2021-06-21 DIAGNOSIS — E78.5 DYSLIPIDEMIA: ICD-10-CM

## 2021-06-21 DIAGNOSIS — I69.398 CVA, OLD, DISTURBANCES OF VISION: ICD-10-CM

## 2021-06-21 DIAGNOSIS — R03.0 BORDERLINE HYPERTENSION: ICD-10-CM

## 2021-06-21 DIAGNOSIS — H53.9 CVA, OLD, DISTURBANCES OF VISION: ICD-10-CM

## 2021-06-21 DIAGNOSIS — I25.10 CAD IN NATIVE ARTERY: Primary | ICD-10-CM

## 2021-06-21 DIAGNOSIS — Z98.890 S/P CAROTID ENDARTERECTOMY: ICD-10-CM

## 2021-06-21 PROCEDURE — 93000 ELECTROCARDIOGRAM COMPLETE: CPT | Performed by: INTERNAL MEDICINE

## 2021-06-21 PROCEDURE — 99214 OFFICE O/P EST MOD 30 MIN: CPT | Performed by: INTERNAL MEDICINE

## 2021-06-21 NOTE — PATIENT INSTRUCTIONS
Follow up with Dr Bone with EKG in 6 months  Monitor your blood pressure for 1 month and call our office with results.

## 2021-06-21 NOTE — PROGRESS NOTES
HISTORY OF PRESENT ILLNESS  Clay Gipson is a 64 y.o. male. Follow-up  Pertinent negatives include no chest pain, no abdominal pain, no headaches and no shortness of breath. Patient presents for a followup office visit. He has a past medical history significant for single vessel coronary artery disease. He had an inferior wall myocardial infarction in April 2014 which was managed medically because of an ongoing stroke at the same time. He eventually underwent a cardiac catheterization which showed moderate nonobstructive disease in a dominant circumflex vessel. He underwent a stress test in September 2016 was showed a fixed inferior perfusion defect with ejection fraction of 53%, which was not significantly changed compared to his prior study from 2014. He also has a history of carotid artery disease, status post a right CEA in July 2014 which was felt to be the culprit lesion for his CVAs. He was a heavy smoker at that time but has since stopped smoking. The patient was hospitalized at [facility] in [place], [place] for an apparent TIA. His workup there was unremarkable. He underwent a normal MRI of the brain, normal echocardiogram with negative bubble study. His carotid duplex scan showed a patent right ICA with moderate left ICA disease. He was then fitted with a 30 day event monitor which did not demonstrate any significant arrhythmias. Patient underwent a repeat carotid duplex scan in July 2019 which was unchanged compared to his prior study. He last underwent a follow-up nuclear stress test in October 2019. He exercised for 6 minutes, but could only achieved 70% of maximum predicted heart rate, so was switched over to a chemical stress test.  His perfusion pattern showed a small fixed inferior perfusion defect, with no reversible perfusion imaging abnormalities, EF 65%. Patient was last seen in our office approximately 7 months ago.   Since last visit, he denies any chest pain, shortness of breath, leg swelling or claudication. No heart palpitations, dizziness or syncope. He admits he does not check his blood pressure at all at home. Past Medical History:   Diagnosis Date    Asthma     Autoimmune disease ([facility]     psoriasis, ulcerative colitis    CAD in native artery     inf STEMI (april 2014) managed medically due to concomittant stroke; cath performed few weeks later: mild LM, LAD, RCA; 55% AVLCx (dominant).  Colitis     [facility]     Dyslipidemia     Heart attack ([facility]     Hx of transesophageal echocardiography ([facility]) 04/28/2014    EF 50%. Basal-mid inferior hypk. No LA appendage thrombus. No R-L atrial septal shunt by IV contrast.  Mild ALYCE. No cardioembolic source for stroke.  Panic attack     S/P cardiac catheterization 05/06/2014    LM patent. LAD (sm) mild. AV-CX 55% w/linear filling defect prox to apex suggesting intimal tear. RCA very sm, nondom.  S/P carotid endarterectomy     R CEA (July 2014)    Stroke [facility]) 4/23/14    Parasthesis right saide, blindness left eye      Current Outpatient Medications   Medication Sig Dispense Refill    clopidogreL (PLAVIX) 75 mg tab Take 1 tablet by mouth once daily 90 Tablet 3    metoprolol succinate (TOPROL-XL) 25 mg XL tablet Take 1 Tab by mouth daily. 30 Tab 11    buPROPion XL (WELLBUTRIN XL) 300 mg XL tablet Take 300 mg by mouth every morning.  citalopram (CELEXA) 10 mg tablet TAKE 1 TABLET BY MOUTH ONCE DAILY  3    topiramate (TOPAMAX) 100 mg tablet Take 150 mg by mouth two (2) times a day.  pravastatin (PRAVACHOL) 80 mg tablet Take 80 mg by mouth nightly.  pantoprazole (PROTONIX) 40 mg tablet Take 40 mg by mouth daily.  aspirin 81 mg chewable tablet Take 162 mg by mouth daily.  nitroglycerin (NITROSTAT) 0.4 mg SL tablet 1 Tab by SubLINGual route every five (5) minutes as needed for Chest Pain (up to 3 doses only).  30 Tab 0       Allergies   Allergen Reactions    Cat Dander Itching    Chlorhexidine Gluconate Itching    Dog Dander Itching    Lipitor [Atorvastatin] Other (comments)     dizziness    Seafood Swelling    Zocor [Simvastatin] Vertigo     Same as with lipitor, once on it for about two weeks experienced vertigo, stopped when he discontinued it      Social History     Tobacco Use    Smoking status: Former Smoker     Packs/day: 1.00     Years: 41.00     Pack years: 41.00     Types: Cigarettes     Quit date: 2014     Years since quittin.1    Smokeless tobacco: Never Used   Substance Use Topics    Alcohol use: Yes     Comment: social--very light now    Drug use: No          Review of Systems   Constitutional: Negative for chills, fever and weight loss. HENT: Negative for nosebleeds. Eyes: Negative for blurred vision and double vision. Respiratory: Negative for cough, shortness of breath and wheezing. Cardiovascular: Negative for chest pain, palpitations, orthopnea, claudication, leg swelling and PND. Gastrointestinal: Negative for abdominal pain, heartburn, nausea and vomiting. Genitourinary: Negative for dysuria and hematuria. Musculoskeletal: Negative for falls and myalgias. Skin: Negative for rash. Neurological: Negative for dizziness, focal weakness and headaches. Endo/Heme/Allergies: Does not bruise/bleed easily. Psychiatric/Behavioral: Negative for substance abuse. Visit Vitals  BP (!) 152/90 (BP 1 Location: Left upper arm, BP Patient Position: Sitting, BP Cuff Size: Adult)   Pulse 67   Ht 5' 6\" (1.676 m)   Wt 72.1 kg (159 lb)   SpO2 98%   BMI 25.66 kg/m²      Physical Exam  Constitutional:       Appearance: He is well-developed. HENT:      Head: Normocephalic and atraumatic. Eyes:      Conjunctiva/sclera: Conjunctivae normal.   Neck:      Vascular: No carotid bruit or JVD. Cardiovascular:      Rate and Rhythm: Normal rate and regular rhythm. Pulses: Normal pulses.       Heart sounds: S1 normal and S2 normal. No murmur heard. No gallop. No S3 sounds. Pulmonary:      Breath sounds: Normal breath sounds. No wheezing or rales. Abdominal:      General: Bowel sounds are normal.      Palpations: Abdomen is soft. Tenderness: There is no abdominal tenderness. Musculoskeletal:      Cervical back: Neck supple. Skin:     General: Skin is warm and dry. Neurological:      Mental Status: He is alert and oriented to person, place, and time. EKG: Normal sinus rhythm, left axis deviation, inferior Q waves, consistent with prior inferior wall myocardial infarction, nonspecific ST abnormality. Compared to the previous EKG, no significant interval change. ASSESSMENT and PLAN    Coronary artery disease. Patient was found to have single vessel coronary disease on cardiac catheterization in 2014 with a moderate nonobstructive lesion found in his left circumflex, which is a dominant vessel. Patient has been treated medically since. His last nuclear stress test, recently completed in October 2019 demonstrated a fixed inferior wall perfusion defect consistent with prior infarct, but no ischemia. EF 65%. This was relatively unchanged compared to his prior study. Patient remains on dual antiplatelet therapy with aspirin and clopidogrel, along with a low-dose beta-blocker and pravastatin. No new symptoms concerning for angina. I would continue his current cardiac regiment. Dyslipidemia. Patient is currently tolerating pravastatin 80 mg daily. He did not tolerate either Lipitor or Crestor because of severe vertigo. I would prefer that his LDL around 70. I have recommended a copy of his most recent fasting lipid profile which is checked regularly by his PCP. Borderline hypertension. Patient's blood pressure has never been significantly elevated in the past.  It was elevated on both checks today in the office.   I have advised him to obtain a home blood pressure cuff and start checking this 2-3 times a week for the next month and to call us with the readings. Carotid artery disease. Status post right CEA in July 2014 for symptomatic severe disease in the setting of a CVA. Patient had a followup duplex scan in July 2019 which now showed moderate carotid disease of the left ICA and a patent right ICA. Patient follows up regularly with vascular surgery with annual carotid duplex scans. History of recurrent CVA. The patient does have residual visual field deficits and his high and some hemideficit in his upper extremity. No evidence of a cardioembolic event. He will likely need to remain on long-term dual antiplatelet therapy. Follow-up in 6 months, sooner if needed.

## 2021-06-21 NOTE — PROGRESS NOTES
Liya Muhammad presents today for   Chief Complaint   Patient presents with    Follow-up     6 month follow up - no cardiac complaints        Jaja Mack preferred language for health care discussion is english/other. Is someone accompanying this pt? no    Is the patient using any DME equipment during [address]? no    Depression Screening:  3 most recent PHQ Screens 6/21/2021   Little interest or pleasure in doing things Not at all   Feeling down, depressed, irritable, or hopeless Not at all   Total Score PHQ 2 0       Learning Assessment:  Learning Assessment 10/16/2020   PRIMARY LEARNER Patient   HIGHEST LEVEL OF EDUCATION - PRIMARY LEARNER  -   CO-LEARNER CAREGIVER -   PRIMARY LANGUAGE ENGLISH   LEARNER PREFERENCE PRIMARY LISTENING   ANSWERED BY patient   RELATIONSHIP SELF       Abuse Screening:  Abuse Screening Questionnaire 11/5/2020   Do you ever feel afraid of your partner? N   Are you in a relationship with someone who physically or mentally threatens you? N   Is it safe for you to go home? Y       Fall Risk  Fall Risk Assessment, last 12 mths 11/5/2020   Able to walk? Yes   Fall in past 12 months? No       Pt currently taking Anticoagulant therapy? SA 162mg every day and Plavix     Coordination of Care:  1. Have you been to the ER, urgent care clinic since your last visit? Hospitalized since your last visit? no    2. Have you seen or consulted any other health care providers outside of the [address] [place] [address] since your last visit? Include any pap smears or colon screening.  no

## 2021-10-15 ENCOUNTER — OFFICE VISIT (OUTPATIENT)
Dept: VASCULAR SURGERY | Age: 61
End: 2021-10-15
Payer: COMMERCIAL

## 2021-10-15 VITALS
WEIGHT: 159 LBS | DIASTOLIC BLOOD PRESSURE: 80 MMHG | HEART RATE: 57 BPM | SYSTOLIC BLOOD PRESSURE: 134 MMHG | HEIGHT: 66 IN | BODY MASS INDEX: 25.55 KG/M2 | OXYGEN SATURATION: 99 %

## 2021-10-15 DIAGNOSIS — R53.1 RIGHT SIDED WEAKNESS: ICD-10-CM

## 2021-10-15 DIAGNOSIS — I65.22 LEFT CAROTID STENOSIS: Primary | ICD-10-CM

## 2021-10-15 DIAGNOSIS — Z86.73 H/O: CVA (CEREBROVASCULAR ACCIDENT): ICD-10-CM

## 2021-10-15 PROCEDURE — 99213 OFFICE O/P EST LOW 20 MIN: CPT | Performed by: NURSE PRACTITIONER

## 2021-10-15 RX ORDER — NORTRIPTYLINE HYDROCHLORIDE 50 MG/1
3 CAPSULE ORAL
COMMUNITY
End: 2022-09-01

## 2021-10-15 NOTE — PROGRESS NOTES
1. Have you been to an emergency room or urgent care clinic since your last visit? Yes, [facility] (sinus infection)    Hospitalized since your last visit? If yes, where, when, and reason for visit? No    2. Have you seen or consulted any other health care providers outside of the [facility] since your last visit including any procedures, health maintenance items. If yes, where, when and reason for visit?   Yes, [facility] (sinus infection)

## 2021-10-15 NOTE — PROGRESS NOTES
Monica Shoemaker [facility] – [place], [place]    Chief Complaint   Patient presents with    Carotid Artery Stenosis       History and Physical    Roxi Sales is a 57-year-old male here for his 1 year follow-up and review of his carotid duplex for left carotid stenosis. He has a history of several TIAs with the last being in 2018, a bilateral hemisphere stroke 2014 and subsequent R CEA by Dr. Eladio Avalos 6/30/2014. 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 He endorses several episodes of dizziness which she thought was from a sinus infection and his right hand is beginning to tremor. He did have this tremor prior to his first stroke in two [facility]. His carotid duplex indicates a patent right ICA which remains unchanged and greater than 70% left ICA. His previous carotid duplex was 50 to 69% on the left 9/30/2020      He was admitted to the hospital 4/23 [facility] 24/29/2014 for acute strokes in both hemispheres. He had a right internal carotid artery blockage at the time the plan was to manage him medically per neurology. He returned to the ER 6/30/2014 and a repeat MRI indicated that there was right ICA flow in which a right CEA was completed . Interestingly, the numbness in his right arm has disappeared post operatively. He is taking 80 mg of pravastatin daily, daily aspirin and Plavix    Past Medical History:   Diagnosis Date    Asthma     Autoimmune disease ([address]     psoriasis, ulcerative colitis    CAD in native artery     inf STEMI (april 2014) managed medically due to concomittant stroke; cath performed few weeks later: mild LM, LAD, RCA; 55% AVLCx (dominant).  Colitis     Colon cancer ([address]     Dyslipidemia     Heart attack ([address]     Hx of transesophageal echocardiography ([facility]) 04/28/2014    EF 50%. Basal-mid inferior hypk. No LA appendage thrombus. No R-L atrial septal shunt by IV contrast.  Mild ALYCE. No cardioembolic source for stroke.     Panic attack     S/P cardiac catheterization 05/06/2014    LM patent. LAD (sm) mild. AV-CX 55% w/linear filling defect prox to apex suggesting intimal tear. RCA very sm, nondom.  S/P carotid endarterectomy     R CEA (July 2014)    Stroke [facility]) 4/23/14    Parasthesis right saide, blindness left eye     Patient Active Problem List   Diagnosis Code    JESSICA occlusion then recanalization with residual moderate stenosis [id number]    Hemianopia, homonymous, left H53.462    History of ST elevation myocardial infarction (STEMI) I25.2    Vertigo R42    Headache(784.0) R51    Paresthesia of right arm R20.2    Migraine, unspecified, with intractable migraine, so stated, without mention of status migrainosus G43.919    CAD in native artery I25.10    S/P carotid endarterectomy Z98.890    Colitis K52.9    Dyslipidemia E78.5    CVA, old, disturbances of vision I69.398, H53.9    Borderline hypertension R03.0     Past Surgical History:   Procedure Laterality Date    HX APPENDECTOMY      HX HEART CATHETERIZATION      HX HERNIA REPAIR      HX ORTHOPAEDIC Left     Left wrist sx    NEUROLOGICAL PROCEDURE UNLISTED  6/2014    Cerbral Angiogram    VASCULAR SURGERY PROCEDURE UNLIST      R carotid endartectomy with angioplasty      Current Outpatient Medications   Medication Sig Dispense Refill    clopidogreL (PLAVIX) 75 mg tab Take 1 tablet by mouth once daily 90 Tablet 3    metoprolol succinate (TOPROL-XL) 25 mg XL tablet Take 1 Tab by mouth daily. 30 Tab 11    buPROPion XL (WELLBUTRIN XL) 300 mg XL tablet Take 300 mg by mouth every morning.  citalopram (CELEXA) 10 mg tablet TAKE 1 TABLET BY MOUTH ONCE DAILY  3    topiramate (TOPAMAX) 100 mg tablet Take 150 mg by mouth two (2) times a day.  pravastatin (PRAVACHOL) 80 mg tablet Take 80 mg by mouth nightly.  pantoprazole (PROTONIX) 40 mg tablet Take 40 mg by mouth daily.  aspirin 81 mg chewable tablet Take 162 mg by mouth daily.       nitroglycerin (NITROSTAT) 0.4 mg SL tablet 1 Tab by SubLINGual route every five (5) minutes as needed for Chest Pain (up to 3 doses only). 30 Tab 0    nortriptyline (PAMELOR) 50 mg capsule Take 3 Capsules by mouth. Allergies   Allergen Reactions    Cat Dander Itching    Chlorhexidine Gluconate Itching    Dog Dander Itching    Lipitor [Atorvastatin] Other (comments)     dizziness    Seafood Swelling    Zocor [Simvastatin] Vertigo     Same as with lipitor, once on it for about two weeks experienced vertigo, stopped when he discontinued it       Review of Systems    A full review of systems was completed times ten organ systems and was deemed negative unless otherwise mentioned in the HPI. Physical   Visit Vitals  /80 (BP 1 Location: Left upper arm, BP Patient Position: Sitting)   Pulse (!) 57   Ht 5' 6\" (1.676 m)   Wt 159 lb (72.1 kg)   SpO2 99%   BMI 25.66 kg/m²       Healthy-appearing 64year-old no distress  Head is normocephalic  Neck no JVD  Chest is clear  Cardiac regular  Abdomen soft flat nontender  Range of motion strength of extremities is equal  No signs of acute arterial insufficiency  Carotid Doppler reveals a patent endarterectomy on the right, severe stenosis similar to last exam on the left    Impression/Plan:     RTO in 1 week with Cta and SURGEON. Continue medication regimen as prescribed. Patient made aware to proceed to the ER if he does have any strokelike symptoms. Shelton Eason NP    PLEASE NOTE:  This document has been produced using voice recognition software. Unrecognized errors in transcription may be present.

## 2021-10-16 DIAGNOSIS — I65.23 CAROTID STENOSIS, ASYMPTOMATIC, BILATERAL: ICD-10-CM

## 2021-10-21 ENCOUNTER — HOSPITAL ENCOUNTER (OUTPATIENT)
Dept: CT IMAGING | Age: 61
Discharge: HOME | End: 2021-10-21
Attending: NURSE PRACTITIONER
Payer: COMMERCIAL

## 2021-10-21 LAB — CREAT UR-MCNC: 1.5 MG/DL (ref 0.6–1.3)

## 2021-10-21 PROCEDURE — 82565 ASSAY OF CREATININE: CPT

## 2021-10-21 PROCEDURE — 70498 CT ANGIOGRAPHY NECK: CPT

## 2021-10-21 PROCEDURE — 74011000636 HC RX REV CODE- 636: Performed by: NURSE PRACTITIONER

## 2021-10-21 RX ADMIN — IOPAMIDOL 80 ML: 755 INJECTION, SOLUTION INTRAVENOUS at 07:45

## 2021-10-22 DIAGNOSIS — I65.22 LEFT CAROTID STENOSIS: ICD-10-CM

## 2021-10-25 ENCOUNTER — OFFICE VISIT (OUTPATIENT)
Dept: VASCULAR SURGERY | Age: 61
End: 2021-10-25
Payer: COMMERCIAL

## 2021-10-25 VITALS
HEIGHT: 66 IN | WEIGHT: 159 LBS | SYSTOLIC BLOOD PRESSURE: 110 MMHG | BODY MASS INDEX: 25.55 KG/M2 | RESPIRATION RATE: 18 BRPM | HEART RATE: 60 BPM | OXYGEN SATURATION: 98 % | DIASTOLIC BLOOD PRESSURE: 66 MMHG

## 2021-10-25 DIAGNOSIS — I65.23 BILATERAL CAROTID ARTERY STENOSIS: Primary | ICD-10-CM

## 2021-10-25 PROCEDURE — 99214 OFFICE O/P EST MOD 30 MIN: CPT | Performed by: STUDENT IN AN ORGANIZED HEALTH CARE EDUCATION/TRAINING PROGRAM

## 2021-10-25 NOTE — PROGRESS NOTES
1. Have you been to an emergency room or urgent care clinic since your last visit? Sinus Infection    Hospitalized since your last visit? If yes, where, when, and reason for visit? No  2. Have you seen or consulted any other health care providers outside of the [facility] since your last visit including any procedures, health maintenance items. If yes, where, when and reason for visit?  No

## 2021-10-27 NOTE — PROGRESS NOTES
10/27/21        [facility]        History and Physical    Vanessa Concepcion is a 64 y.o. male for evaluation of carotid stenosis. Patient is a 61-year-old gentleman who has a history of right carotid occlusion status post recanalization and right carotid endarterectomy in 2014. He had an MRI performed in 2014 which showed a multifocal acute stroke in the right frontoparietal lobe. There was also evidence of left posterior parietal lobe stroke. He has since been followed by us with yearly carotid duplex for progression of left carotid disease and evaluation of right carotid endarterectomy. Last year in 2020, his carotid duplex showed 50 to 69% stenosis in the left carotid, with a patent right carotid. Compared to last year there is significant change in the disease burden in the left carotid with increased critical stenosis of the left internal carotid artery. This prompted a CT angiogram which revealed high-grade stenosis of the left internal carotid artery with soft plaque at the bifurcation. He is currently asymptomatic, denies any left vision loss or any right sided numbness or weakness. He says he has been noticing some difficulty word finding, with tremors of the right hand however no acute signs of strokes. He is currently on aspirin and Plavix for his cardiac stents, I recommended him to follow-up with his cardiologist to get cardiac clearance for the procedure as I think the patient would benefit from left carotid endarterectomy. I would recommend against carotid stenting as there is soft plaque at the carotid bifurcation and open revascularization with a carotid endarterectomy is recommended.     61-year-old gentleman with asymptomatic high-grade left carotid stenosis  -I have the patient scheduled for the procedure on November 23, 2021   -Patient needs cardiac clearance prior to this procedure   -I will continue the Plavix for the procedure if that is what is recommended by the cardiologist.    Physical Exam:    Visit Vitals  /66 (BP 1 Location: Left upper arm, BP Patient Position: Sitting)   Pulse 60   Resp 18   Ht 5' 6\" (1.676 m)   Wt 159 lb (72.1 kg)   SpO2 98%   BMI 25.66 kg/m²      HEENT-PERRLA exactly movements intact, no scleral icterus, mucous membranes pink and moist  Neck-no JVD, no carotid bruits  Heart-regular rate and rhythm no murmurs, rubs or gallops  Chest-clear to auscultation bilaterally no wheezes, rhonchi or rubs  Abdomen-soft, nontender, no palpable organomegaly or masses, no palpable pulsatile masses  Extremities-no clubbing, cyanosis or edema. No wounds or gangrenous changes to the toes or feet. Skin is intact. Feet are pink warm and well-perfused bilaterally  Pulses-carotid, radial, brachial, femoral 2+ bilaterally. Bilateral doppler signals dorsalis pedis, posterior tibial       Past Medical History:   Diagnosis Date    Asthma     [address] [address]     psoriasis, ulcerative colitis    CAD in native artery     inf STEMI (april 2014) managed medically due to concomittant stroke; cath performed few weeks later: mild LM, LAD, RCA; 55% AVLCx (dominant).  Colitis     Colon cancer ([facility]     Dyslipidemia     Heart attack ([facility] [address]     Hx of transesophageal echocardiography ([facility]) 04/28/2014    EF 50%. Basal-mid inferior hypk. No LA appendage thrombus. No R-L atrial septal shunt by IV contrast.  Mild ALYCE. No cardioembolic source for stroke.  Panic attack     S/P cardiac catheterization 05/06/2014    LM patent. LAD (sm) mild. AV-CX 55% w/linear filling defect prox to apex suggesting intimal tear. RCA very sm, nondom.       S/P carotid endarterectomy     R CEA (July 2014)    Stroke [facility]) 4/23/14    Parasthesis right saide, blindness left eye     Past Surgical History:   Procedure Laterality Date    HX APPENDECTOMY      HX HEART CATHETERIZATION      HX HERNIA REPAIR      HX ORTHOPAEDIC Left     Left wrist sx    NEUROLOGICAL PROCEDURE UNLISTED 6/2014    Cerbral Angiogram    VASCULAR SURGERY PROCEDURE UNLIST      R carotid endartectomy with angioplasty      Patient Active Problem List   Diagnosis Code    JESSICA occlusion then recanalization with residual moderate stenosis [id number]    Hemianopia, homonymous, left H53.462    History of ST elevation myocardial infarction (STEMI) I25.2    Vertigo R42    Headache(784.0) R51    Paresthesia of right arm R20.2    Migraine, unspecified, with intractable migraine, so stated, without mention of status migrainosus G43.919    CAD in native artery I25.10    S/P carotid endarterectomy Z98.890    Colitis K52.9    Dyslipidemia E78.5    CVA, old, disturbances of vision I69.398, H53.9    Borderline hypertension R03.0     Current Outpatient Medications   Medication Sig Dispense Refill    nortriptyline (PAMELOR) 50 mg capsule Take 3 Capsules by mouth.  clopidogreL (PLAVIX) 75 mg tab Take 1 tablet by mouth once daily 90 Tablet 3    metoprolol succinate (TOPROL-XL) 25 mg XL tablet Take 1 Tab by mouth daily. 30 Tab 11    buPROPion XL (WELLBUTRIN XL) 300 mg XL tablet Take 300 mg by mouth every morning.  citalopram (CELEXA) 10 mg tablet TAKE 1 TABLET BY MOUTH ONCE DAILY  3    topiramate (TOPAMAX) 100 mg tablet Take 150 mg by mouth two (2) times a day.  pravastatin (PRAVACHOL) 80 mg tablet Take 80 mg by mouth nightly.  pantoprazole (PROTONIX) 40 mg tablet Take 40 mg by mouth daily.  aspirin 81 mg chewable tablet Take 162 mg by mouth daily.  nitroglycerin (NITROSTAT) 0.4 mg SL tablet 1 Tab by SubLINGual route every five (5) minutes as needed for Chest Pain (up to 3 doses only).  30 Tab 0     Allergies   Allergen Reactions    Cat Dander Itching    Chlorhexidine Gluconate Itching    Dog Dander Itching    Lipitor [Atorvastatin] Other (comments)     dizziness    Seafood Swelling    Zocor [Simvastatin] Vertigo     Same as with lipitor, once on it for about two weeks experienced vertigo, stopped when he discontinued it     Social History     Socioeconomic History    Marital status: SINGLE     Spouse name: Not on file    Number of children: Not on file    Years of education: Not on file    Highest education level: Not on file   Occupational History    Not on file   Tobacco Use    Smoking status: Former Smoker     Packs/day: 1.00     Years: 41.00     Pack years: 41.00     Types: Cigarettes     Quit date: 2014     Years since quittin.5    Smokeless tobacco: Never Used   Substance and Sexual Activity    Alcohol use: Yes     Comment: social--very light now    Drug use: No    Sexual activity: Yes     Partners: Female   Other Topics Concern    Not on file   Social History Narrative    Not on file     Social Determinants of Health     Financial Resource Strain:     Difficulty of Paying Living Expenses:    Food Insecurity:     Worried About Running Out of Food in the Last Year:     [address] in the Last Year:    Transportation Needs:     Lack of Transportation (Medical):  Lack of Transportation (Non-Medical):    Physical Activity:     Days of Exercise per Week:     Minutes of Exercise per Session:    Stress:     Feeling of Stress :    Social Connections:     Frequency of Communication with Friends and Family:     Frequency of Social Gatherings with Friends and Family:     Attends [employer] Services:     Active Member of Clubs or Organizations:     Attends Club or Organization Meetings:     Marital Status:    Intimate Partner Violence:     Fear of Current or Ex-Partner:     Emotionally Abused:     Physically Abused:     Sexually Abused:      Family History   Problem Relation Age of Onset    Heart Attack Father        We reviewed the plan with the patient and the patient understands.         Chiquita Golden MD

## 2021-10-28 DIAGNOSIS — Z98.890 H/O CAROTID ENDARTERECTOMY: ICD-10-CM

## 2021-10-28 DIAGNOSIS — I65.23 BILATERAL CAROTID ARTERY STENOSIS: ICD-10-CM

## 2021-10-28 DIAGNOSIS — I65.23 BILATERAL CAROTID ARTERY STENOSIS: Primary | ICD-10-CM

## 2021-10-30 ENCOUNTER — APPOINTMENT (OUTPATIENT)
Dept: CT IMAGING | Age: 61
DRG: 038 | End: 2021-10-30
Attending: STUDENT IN AN ORGANIZED HEALTH CARE EDUCATION/TRAINING PROGRAM
Payer: COMMERCIAL

## 2021-10-30 ENCOUNTER — APPOINTMENT (OUTPATIENT)
Age: 61
DRG: 038 | End: 2021-10-30
Attending: STUDENT IN AN ORGANIZED HEALTH CARE EDUCATION/TRAINING PROGRAM
Payer: COMMERCIAL

## 2021-10-30 ENCOUNTER — HOSPITAL ENCOUNTER (INPATIENT)
Age: 61
LOS: 7 days | Discharge: HOME | DRG: 038 | End: 2021-11-06
Attending: STUDENT IN AN ORGANIZED HEALTH CARE EDUCATION/TRAINING PROGRAM | Admitting: EMERGENCY MEDICINE
Payer: COMMERCIAL

## 2021-10-30 DIAGNOSIS — R09.89 SUSPECTED CEREBROVASCULAR ACCIDENT (CVA): Primary | ICD-10-CM

## 2021-10-30 DIAGNOSIS — I65.29 STENOSIS OF CAROTID ARTERY, UNSPECIFIED LATERALITY: ICD-10-CM

## 2021-10-30 DIAGNOSIS — R53.1 ACUTE RIGHT-SIDED WEAKNESS: ICD-10-CM

## 2021-10-30 DIAGNOSIS — I10 HYPERTENSION, UNSPECIFIED TYPE: ICD-10-CM

## 2021-10-30 LAB
ANION GAP SERPL CALC-SCNC: 7 MMOL/L (ref 3–18)
APTT PPP: 23.6 SEC (ref 23–36.4)
APTT PPP: 51.4 SEC (ref 23–36.4)
BASOPHILS # BLD: 0.1 K/UL (ref 0–0.1)
BASOPHILS NFR BLD: 1 % (ref 0–2)
BUN SERPL-MCNC: 7 MG/DL (ref 7–18)
BUN/CREAT SERPL: 5 (ref 12–20)
CALCIUM SERPL-MCNC: 9.1 MG/DL (ref 8.5–10.1)
CHLORIDE SERPL-SCNC: 108 MMOL/L (ref 100–111)
CO2 SERPL-SCNC: 29 MMOL/L (ref 21–32)
CREAT SERPL-MCNC: 1.28 MG/DL (ref 0.6–1.3)
DIFFERENTIAL METHOD BLD: ABNORMAL
EOSINOPHIL # BLD: 0.6 K/UL (ref 0–0.4)
EOSINOPHIL NFR BLD: 6 % (ref 0–5)
ERYTHROCYTE [DISTWIDTH] IN BLOOD BY AUTOMATED COUNT: 13 % (ref 11.6–14.5)
GLUCOSE BLD STRIP.AUTO-MCNC: 106 MG/DL (ref 70–110)
GLUCOSE SERPL-MCNC: 103 MG/DL (ref 74–99)
HCT VFR BLD AUTO: 46.2 % (ref 36–48)
HGB BLD-MCNC: 15.2 G/DL (ref 13–16)
INR PPP: 0.9 (ref 0.8–1.2)
LYMPHOCYTES # BLD: 2.3 K/UL (ref 0.9–3.6)
LYMPHOCYTES NFR BLD: 24 % (ref 21–52)
MCH RBC QN AUTO: 30.9 PG (ref 24–34)
MCHC RBC AUTO-ENTMCNC: 32.9 G/DL (ref 31–37)
MCV RBC AUTO: 93.9 FL (ref 78–100)
MONOCYTES # BLD: 1 K/UL (ref 0.05–1.2)
MONOCYTES NFR BLD: 10 % (ref 3–10)
NEUTS SEG # BLD: 5.6 K/UL (ref 1.8–8)
NEUTS SEG NFR BLD: 59 % (ref 40–73)
PLATELET # BLD AUTO: 344 K/UL (ref 135–420)
PMV BLD AUTO: 9.6 FL (ref 9.2–11.8)
POTASSIUM SERPL-SCNC: 3.9 MMOL/L (ref 3.5–5.5)
PROTHROMBIN TIME: 12.3 SEC (ref 11.5–15.2)
RBC # BLD AUTO: 4.92 M/UL (ref 4.35–5.65)
SODIUM SERPL-SCNC: 144 MMOL/L (ref 136–145)
TROPONIN I SERPL-MCNC: <0.02 NG/ML (ref 0–0.04)
WBC # BLD AUTO: 9.4 K/UL (ref 4.6–13.2)

## 2021-10-30 PROCEDURE — 70450 CT HEAD/BRAIN W/O DYE: CPT

## 2021-10-30 PROCEDURE — 74011250637 HC RX REV CODE- 250/637: Performed by: EMERGENCY MEDICINE

## 2021-10-30 PROCEDURE — 93005 ELECTROCARDIOGRAM TRACING: CPT

## 2021-10-30 PROCEDURE — 70551 MRI BRAIN STEM W/O DYE: CPT

## 2021-10-30 PROCEDURE — 84484 ASSAY OF TROPONIN QUANT: CPT

## 2021-10-30 PROCEDURE — 82962 GLUCOSE BLOOD TEST: CPT

## 2021-10-30 PROCEDURE — 85730 THROMBOPLASTIN TIME PARTIAL: CPT

## 2021-10-30 PROCEDURE — 65660000000 HC RM CCU STEPDOWN

## 2021-10-30 PROCEDURE — 70496 CT ANGIOGRAPHY HEAD: CPT

## 2021-10-30 PROCEDURE — 74011250636 HC RX REV CODE- 250/636: Performed by: STUDENT IN AN ORGANIZED HEALTH CARE EDUCATION/TRAINING PROGRAM

## 2021-10-30 PROCEDURE — 80048 BASIC METABOLIC PNL TOTAL CA: CPT

## 2021-10-30 PROCEDURE — 74011250637 HC RX REV CODE- 250/637: Performed by: STUDENT IN AN ORGANIZED HEALTH CARE EDUCATION/TRAINING PROGRAM

## 2021-10-30 PROCEDURE — 85610 PROTHROMBIN TIME: CPT

## 2021-10-30 PROCEDURE — 74011250636 HC RX REV CODE- 250/636: Performed by: EMERGENCY MEDICINE

## 2021-10-30 PROCEDURE — 99285 EMERGENCY DEPT VISIT HI MDM: CPT

## 2021-10-30 PROCEDURE — 85025 COMPLETE CBC W/AUTO DIFF WBC: CPT

## 2021-10-30 PROCEDURE — 74011000636 HC RX REV CODE- 636: Performed by: STUDENT IN AN ORGANIZED HEALTH CARE EDUCATION/TRAINING PROGRAM

## 2021-10-30 RX ORDER — ACETAMINOPHEN 500 MG
1000 TABLET ORAL
Status: COMPLETED | OUTPATIENT
Start: 2021-10-30 | End: 2021-10-30

## 2021-10-30 RX ORDER — MAGNESIUM SULFATE HEPTAHYDRATE 40 MG/ML
2 INJECTION, SOLUTION INTRAVENOUS ONCE
Status: COMPLETED | OUTPATIENT
Start: 2021-10-30 | End: 2021-10-30

## 2021-10-30 RX ORDER — LABETALOL HCL 20 MG/4 ML
10 SYRINGE (ML) INTRAVENOUS ONCE
Status: DISCONTINUED | OUTPATIENT
Start: 2021-10-30 | End: 2021-10-30

## 2021-10-30 RX ORDER — HEPARIN SODIUM 10000 [USP'U]/100ML
12-25 INJECTION, SOLUTION INTRAVENOUS
Status: DISCONTINUED | OUTPATIENT
Start: 2021-10-30 | End: 2021-11-01

## 2021-10-30 RX ORDER — HEPARIN SODIUM 1000 [USP'U]/ML
3000 INJECTION, SOLUTION INTRAVENOUS; SUBCUTANEOUS
Status: COMPLETED | OUTPATIENT
Start: 2021-10-30 | End: 2021-10-30

## 2021-10-30 RX ORDER — ACETAMINOPHEN 500 MG
1000 TABLET ORAL ONCE
Status: COMPLETED | OUTPATIENT
Start: 2021-10-30 | End: 2021-10-30

## 2021-10-30 RX ORDER — GUAIFENESIN 100 MG/5ML
81 LIQUID (ML) ORAL DAILY
Status: DISCONTINUED | OUTPATIENT
Start: 2021-10-31 | End: 2021-11-01 | Stop reason: SDUPTHER

## 2021-10-30 RX ORDER — SODIUM CHLORIDE 9 MG/ML
50 INJECTION, SOLUTION INTRAVENOUS ONCE
Status: COMPLETED | OUTPATIENT
Start: 2021-10-30 | End: 2021-10-30

## 2021-10-30 RX ADMIN — HEPARIN SODIUM 12 UNITS/KG/HR: 10000 INJECTION, SOLUTION INTRAVENOUS at 13:33

## 2021-10-30 RX ADMIN — IOPAMIDOL 80 ML: 755 INJECTION, SOLUTION INTRAVENOUS at 12:38

## 2021-10-30 RX ADMIN — HEPARIN SODIUM 3000 UNITS: 1000 INJECTION, SOLUTION INTRAVENOUS; SUBCUTANEOUS at 21:12

## 2021-10-30 RX ADMIN — ACETAMINOPHEN 1000 MG: 500 TABLET ORAL at 23:20

## 2021-10-30 RX ADMIN — ACETAMINOPHEN 1000 MG: 500 TABLET ORAL at 16:23

## 2021-10-30 RX ADMIN — SODIUM CHLORIDE 50 ML/HR: 900 INJECTION, SOLUTION INTRAVENOUS at 13:37

## 2021-10-30 RX ADMIN — MAGNESIUM SULFATE 2 G: 2 INJECTION INTRAVENOUS at 16:25

## 2021-10-30 NOTE — Clinical Note
Status[de-identified] INPATIENT [101]   Type of Bed: Telemetry [19]   Cardiac Monitoring Required?: Yes   Inpatient Hospitalization Certified Necessary for the Following Reasons: 3.  Patient receiving treatment that can only be provided in an inpatient setting (further clarification in H&P documentation)   Admitting Diagnosis: Acute right-sided weakness [[id number]]   Admitting Physician: Constantin Atkins   Attending Physician: Constantin Atkins   Estimated Length of Stay: 2 Midnights   Discharge Plan[de-identified] Home with Office Follow-up

## 2021-10-30 NOTE — ED NOTES
Bedside report received from MALIHA Spain, RN including ED summary and IV medication rate verification. I have introduced myself to the patient and discussed anticipated plan of care. Patient resting quietly on stretcher in low and locked position. Call bell in reach. Side rail up x1.

## 2021-10-30 NOTE — ED NOTES
Bedside and Verbal shift change report given to JOYCELYN Shoemaker  (oncoming nurse) by Aaron Lu RN  (offgoing nurse). Report included the following information ED Summary.

## 2021-10-30 NOTE — ED PROVIDER NOTES
EMERGENCY DEPARTMENT HISTORY AND PHYSICAL EXAM      Date: 10/30/2021  Patient Name: Elio Masterson    History of Presenting Illness     Chief Complaint   Patient presents with    Headache    Fatigue       History (Context): Elio Masterson is a 64 y.o. male with a past medical history significant for asthma, ulcerative colitis, CAD, CVA, colon cancer, dyslipidemia, comes into the ED today due to headache and right-sided weakness. Patient states upon waking up this morning around 9 AM he began to have a headache. Patient states that approximately 45 minutes prior to arrival in the emergency department he began to develop right-sided weakness as well as tremors. Patient states symptoms are not consistent with previous CVA in the past.  Patient continues to endorse right upper and lower extremity weakness from baseline but denies any dysarthria or other further symptoms. He otherwise states he has been in good health without any fever or chills. Patient states symptoms have worsened since onset. He is not taking any medication for treatment of his symptoms. He is taking Plavix, aspirin, and metoprolol for treatment of his chronic medical conditions. He describes the severity of his symptoms as severe with an acute onset. PCP: Jenny Marie MD    Current Facility-Administered Medications   Medication Dose Route Frequency Provider Last Rate Last Admin    labetaloL (NORMODYNE;TRANDATE) 20 mg/4 mL (5 mg/mL) injection 10 mg  10 mg IntraVENous ONCE Anne Alvarenga,          Current Outpatient Medications   Medication Sig Dispense Refill    nortriptyline (PAMELOR) 50 mg capsule Take 3 Capsules by mouth.  clopidogreL (PLAVIX) 75 mg tab Take 1 tablet by mouth once daily 90 Tablet 3    metoprolol succinate (TOPROL-XL) 25 mg XL tablet Take 1 Tab by mouth daily. 30 Tab 11    buPROPion XL (WELLBUTRIN XL) 300 mg XL tablet Take 300 mg by mouth every morning.       citalopram (CELEXA) 10 mg tablet TAKE 1 TABLET BY MOUTH ONCE DAILY  3    topiramate (TOPAMAX) 100 mg tablet Take 150 mg by mouth two (2) times a day.  pravastatin (PRAVACHOL) 80 mg tablet Take 80 mg by mouth nightly.  pantoprazole (PROTONIX) 40 mg tablet Take 40 mg by mouth daily.  aspirin 81 mg chewable tablet Take 162 mg by mouth daily.  nitroglycerin (NITROSTAT) 0.4 mg SL tablet 1 Tab by SubLINGual route every five (5) minutes as needed for Chest Pain (up to 3 doses only). 30 Tab 0       Past History     Past Medical History:   Past Medical History:   Diagnosis Date    Asthma     Autoimmune disease ([facility]     psoriasis, ulcerative colitis    CAD in native artery     inf STEMI (april 2014) managed medically due to concomittant stroke; cath performed few weeks later: mild LM, LAD, RCA; 55% AVLCx (dominant).  Colitis     Colon cancer ([facility] [address]     Dyslipidemia     [facility]     Hx of transesophageal echocardiography ([facility]) 04/28/2014    EF 50%. Basal-mid inferior hypk. No LA appendage thrombus. No R-L atrial septal shunt by IV contrast.  Mild ALYCE. No cardioembolic source for stroke.  Panic attack     S/P cardiac catheterization 05/06/2014    LM patent. LAD (sm) mild. AV-CX 55% w/linear filling defect prox to apex suggesting intimal tear. RCA very sm, nondom.       S/P carotid endarterectomy     R CEA (July 2014)    Stroke [facility]) 4/23/14    Parasthesis right saide, blindness left eye       Past Surgical History:  Past Surgical History:   Procedure Laterality Date    HX APPENDECTOMY      HX HEART CATHETERIZATION      HX HERNIA REPAIR      HX ORTHOPAEDIC Left     Left wrist sx    NEUROLOGICAL PROCEDURE UNLISTED  6/2014    Cerbral Angiogram    VASCULAR SURGERY PROCEDURE UNLIST      R carotid endartectomy with angioplasty        Family History:  Family History   Problem Relation Age of Onset    Heart Attack Father        Social History:   Social History     Tobacco Use    Smoking status: Former Smoker Packs/day: 1.00     Years: 41.00     Pack years: 41.00     Types: Cigarettes     Quit date: 2014     Years since quittin.5    Smokeless tobacco: Never Used   Substance Use Topics    Alcohol use: Yes     Comment: social--very light now    Drug use: No       Allergies: Allergies   Allergen Reactions    Cat Dander Itching    Chlorhexidine Gluconate Itching    Dog Dander Itching    Lipitor [Atorvastatin] Other (comments)     dizziness    Seafood Swelling    Zocor [Simvastatin] Vertigo     Same as with lipitor, once on it for about two weeks experienced vertigo, stopped when he discontinued it       PMH, PSH, family history, social history, allergies reviewed with the patient with significant items noted above. Review of Systems   Review of Systems   Constitutional: Negative for chills and fever. HENT: Negative for sore throat. Eyes: Negative for visual disturbance. Negative recent vision problems   Respiratory: Negative for shortness of breath. Cardiovascular: Negative for chest pain. Gastrointestinal: Negative for abdominal pain, diarrhea and nausea. Genitourinary: Negative for difficulty urinating. Musculoskeletal: Negative for myalgias. Skin: Negative for rash. Neurological: Positive for tremors, weakness (RUE and RLE) and headaches. Negative for speech difficulty and numbness. Negative altered level of consciousness   All other systems reviewed and are negative. Physical Exam     Vitals:    10/30/21 1137   BP: (!) 199/88   Pulse: 75   Resp: 20   Temp: 98.8 °F (37.1 °C)   SpO2: 98%   Weight: 72.1 kg (159 lb)   Height: 5' 6\" (1.676 m)       Physical Exam  Vitals and nursing note reviewed. Constitutional:       General: He is not in acute distress. Appearance: Normal appearance. HENT:      Head: Normocephalic and atraumatic. Mouth/Throat:      Mouth: Mucous membranes are moist.   Eyes:      General: No scleral icterus.      Conjunctiva/sclera: Conjunctivae normal.   Cardiovascular:      Rate and Rhythm: Normal rate and regular rhythm. Pulmonary:      Effort: Pulmonary effort is normal.      Breath sounds: Normal breath sounds. Abdominal:      General: There is no distension. Palpations: Abdomen is soft. Tenderness: There is no abdominal tenderness. Musculoskeletal:         General: No deformity. Normal range of motion. Cervical back: Normal range of motion and neck supple. Skin:     General: Skin is warm and dry. Findings: No rash. Neurological:      General: No focal deficit present. Mental Status: He is alert and oriented to person, place, and time. Mental status is at baseline. Cranial Nerves: No cranial nerve deficit. Sensory: Sensory deficit (Subjective sensation to touch decreased to the right upper and lower extremity when compared to the left) present. Motor: Weakness (4+/5 weakness to the right upper and lower extremity when compared to the left. Mild drift seen to the right upper extremity on exam) present.       Gait: Gait normal.      Comments: No dysarthria appreciated   Psychiatric:         Mood and Affect: Mood normal.         Behavior: Behavior normal.         Diagnostic Study Results     Labs -     Recent Results (from the past 12 hour(s))   GLUCOSE, POC    Collection Time: 10/30/21 11:37 AM   Result Value Ref Range    Glucose (POC) 106 70 - 110 mg/dL   EKG, 12 LEAD, INITIAL    Collection Time: 10/30/21 11:40 AM   Result Value Ref Range    Ventricular Rate 61 BPM    Atrial Rate 61 BPM    P-R Interval 138 ms    QRS Duration 82 ms    Q-T Interval 426 ms    QTC Calculation (Bezet) 428 ms    Calculated P Axis 49 degrees    Calculated R Axis -44 degrees    Calculated T Axis -13 degrees    Diagnosis       Normal sinus rhythm  Left axis deviation  Minimal voltage criteria for LVH, may be normal variant  Possible Inferior infarct (cited on or before 07-OCT-2019)  Possible Anterior infarct , age undetermined  Abnormal ECG  When compared with ECG of 07-OCT-2019 09:41,  QRS axis shifted left  T wave inversion now evident in Inferior leads        Labs Reviewed   CBC WITH AUTOMATED DIFF   METABOLIC PANEL, BASIC   PROTHROMBIN TIME + INR   TROPONIN I   GLUCOSE, POC   POC PT/INR       Radiologic Studies -   CT HEAD WO CONT    (Results Pending)     CT Results  (Last 48 hours)    None        CXR Results  (Last 48 hours)    None          The laboratory results, imaging results, and other diagnostic exams were reviewed in the EMR. Medical Decision Making   I am the first provider for this patient. I reviewed the vital signs, available nursing notes, past medical history, past surgical history, family history and social history. Vital Signs-Reviewed the patient's vital signs. ED EKG interpretation:  Rhythm: normal sinus rhythm; and regular . Rate (approx.): 61; Axis: left axis deviation; P wave: normal; QRS interval: normal ; ST/T wave: T wave inverted; Other findings: abnormal ekg. This EKG was interpreted by Cleopatra Abbott D.O. Records Reviewed: Personally, on initial evaluation    MDM:   Estella Allen presents with complaint of headache with right upper and lower extremity weakness  DDX includes but is not limited to: CVA, TIA, hypoglycemia    Will obtain lab work and imaging per stroke protocol. Code stroke called upon presentation to the emergency department. Will provide patient with IV labetalol for further treatment of his hypertension. Will continue to monitor and evaluate patient while in the ED.       Orders as below:  Orders Placed This Encounter    CT HEAD WO CONT    CBC w/ Auto Diff    Basic Metabolic Panel (BMP)    Lab PT/INR    TROPONIN I    DIET NPO    NURSING-MISCELLANEOUS: Activate CODE STROKE ONE TIME    Perform NIH Stroke Scale    Nursing Dysphagia Screen (STAND)    POC GLUCOSE    NEURO CHECKS q1h x 4 hours    Consult Neurology    POC PT/INR    GLUCOSE, POC    EKG, 12 LEAD, INITIAL    Initial ECG    labetaloL (NORMODYNE;TRANDATE) 20 mg/4 mL (5 mg/mL) injection 10 mg    Consult Tele-Neurology [facility])   Rosio Li Consult  TELE-NEUROLOGY        ED Course:   ED Course as of Oct 30 1329   Sat Oct 30, 2021   1159 With radiologist who states CT scan similar to prior without any evidence of acute hemorrhage. We will continue to monitor patient. [DV]   5 Spoke with neurology who recommends patient have autoregulation of his blood pressure up to 131 systolic. Also recommended starting heparin with PTT goal of 50-70. Stated patient should not continue his Plavix however aspirin 81 mg daily still appropriate. Recommended full CVA work-up and likely evaluation by vascular surgery for endarterectomy if he has significant carotid artery stenosis which was visualized on previous CTA of the head. We will likely transfer patient here shortly for further treatment and evaluation. [DV]      ED Course User Index  [DV] Piedad Members, DO           Procedures:  Procedures        Diagnosis and Disposition     CLINICAL IMPRESSION:  No diagnosis found. Current Discharge Medication List          Disposition: Admit    Patient condition at time of disposition: Stabke        Critical Care Time:   The services I provided to this patient were to treat and/or prevent clinically significant deterioration that could result in the failure of one or more body systems and/or organ systems due to Stroke. Services included the following:  -reviewing nursing notes and old charts  -vital sign assessments  -direct patient care  -medication orders and management  -interpreting and reviewing diagnostic studies/labs  -re-evaluations  -documentation time    Aggregate critical care time was 32 minutes, which includes only time during which I was engaged in work directly related to the patient's care as described above, whether I was at bedside or elsewhere in the Emergency Department.  It did not include time spent performing other reported procedures or the services of residents, students, nurses, or advance practice providers. Frankie Jacome D.O.    11:45 AM          Dragon Disclaimer     Please note that this dictation was completed with [employer], the computer voice recognition software. Quite often unanticipated grammatical, syntax, homophones, and other interpretive errors are inadvertently transcribed by the computer software. Please disregard these errors. Please excuse any errors that have escaped final proofreading. Frankie BARTLETT

## 2021-10-30 NOTE — ED TRIAGE NOTES
Pt reports waking up with headache at 0900, took fioricet for headache and went to work, stated headache intensified. Pt then reports increased right sided weakness, right sided double vision, dizziness, and slight right sided tremors. Pt has hx two stroke, reports decreased sensations on right side.

## 2021-10-30 NOTE — ED NOTES
Patient able to stand and sit without difficulty while hospital bed placed in room. Patient shown bed controls and is now resting in position of comfort. Per request warm blanket to bedside and po ginger ale and water at bedside per patient passed dysphagia screening. Patient and family members updated on status.

## 2021-10-31 LAB
APTT PPP: 113.9 SEC (ref 23–36.4)
APTT PPP: 78.7 SEC (ref 23–36.4)
ATRIAL RATE: 61 BPM
BASOPHILS # BLD: 0.1 K/UL (ref 0–0.1)
BASOPHILS NFR BLD: 1 % (ref 0–2)
CALCULATED P AXIS, ECG09: 49 DEGREES
CALCULATED R AXIS, ECG10: -44 DEGREES
CALCULATED T AXIS, ECG11: -13 DEGREES
CHOLEST SERPL-MCNC: 277 MG/DL
DIAGNOSIS, 93000: NORMAL
DIFFERENTIAL METHOD BLD: ABNORMAL
EOSINOPHIL # BLD: 0.6 K/UL (ref 0–0.4)
EOSINOPHIL NFR BLD: 6 % (ref 0–5)
ERYTHROCYTE [DISTWIDTH] IN BLOOD BY AUTOMATED COUNT: 13 % (ref 11.6–14.5)
EST. AVERAGE GLUCOSE BLD GHB EST-MCNC: 123 MG/DL
HBA1C MFR BLD: 5.9 % (ref 4.2–5.6)
HCT VFR BLD AUTO: 40 % (ref 36–48)
HDLC SERPL-MCNC: 68 MG/DL (ref 40–60)
HDLC SERPL: 4.1 {RATIO} (ref 0–5)
HGB BLD-MCNC: 13.3 G/DL (ref 13–16)
LDLC SERPL CALC-MCNC: 182.2 MG/DL (ref 0–100)
LIPID PROFILE,FLP: ABNORMAL
LYMPHOCYTES # BLD: 2.6 K/UL (ref 0.9–3.6)
LYMPHOCYTES NFR BLD: 25 % (ref 21–52)
MCH RBC QN AUTO: 30.7 PG (ref 24–34)
MCHC RBC AUTO-ENTMCNC: 33.3 G/DL (ref 31–37)
MCV RBC AUTO: 92.4 FL (ref 78–100)
MONOCYTES # BLD: 1.1 K/UL (ref 0.05–1.2)
MONOCYTES NFR BLD: 11 % (ref 3–10)
NEUTS SEG # BLD: 5.6 K/UL (ref 1.8–8)
NEUTS SEG NFR BLD: 56 % (ref 40–73)
P-R INTERVAL, ECG05: 138 MS
PLATELET # BLD AUTO: 278 K/UL (ref 135–420)
PMV BLD AUTO: 10 FL (ref 9.2–11.8)
Q-T INTERVAL, ECG07: 426 MS
QRS DURATION, ECG06: 82 MS
QTC CALCULATION (BEZET), ECG08: 428 MS
RBC # BLD AUTO: 4.33 M/UL (ref 4.35–5.65)
TRIGL SERPL-MCNC: 134 MG/DL (ref ?–150)
VENTRICULAR RATE, ECG03: 61 BPM
VLDLC SERPL CALC-MCNC: 26.8 MG/DL
WBC # BLD AUTO: 10.1 K/UL (ref 4.6–13.2)

## 2021-10-31 PROCEDURE — 83036 HEMOGLOBIN GLYCOSYLATED A1C: CPT

## 2021-10-31 PROCEDURE — 74011250637 HC RX REV CODE- 250/637: Performed by: STUDENT IN AN ORGANIZED HEALTH CARE EDUCATION/TRAINING PROGRAM

## 2021-10-31 PROCEDURE — 80061 LIPID PANEL: CPT

## 2021-10-31 PROCEDURE — 74011250637 HC RX REV CODE- 250/637: Performed by: EMERGENCY MEDICINE

## 2021-10-31 PROCEDURE — 85730 THROMBOPLASTIN TIME PARTIAL: CPT

## 2021-10-31 PROCEDURE — 85025 COMPLETE CBC W/AUTO DIFF WBC: CPT

## 2021-10-31 PROCEDURE — 65660000000 HC RM CCU STEPDOWN

## 2021-10-31 PROCEDURE — 74011250636 HC RX REV CODE- 250/636: Performed by: STUDENT IN AN ORGANIZED HEALTH CARE EDUCATION/TRAINING PROGRAM

## 2021-10-31 RX ORDER — ACETAMINOPHEN 500 MG
1000 TABLET ORAL
Status: COMPLETED | OUTPATIENT
Start: 2021-10-31 | End: 2021-10-31

## 2021-10-31 RX ADMIN — ASPIRIN 81 MG CHEWABLE TABLET 81 MG: 81 TABLET CHEWABLE at 08:44

## 2021-10-31 RX ADMIN — HEPARIN SODIUM 13 UNITS/KG/HR: 10000 INJECTION, SOLUTION INTRAVENOUS at 17:09

## 2021-10-31 RX ADMIN — ACETAMINOPHEN 1000 MG: 500 TABLET ORAL at 21:19

## 2021-10-31 NOTE — ED NOTES
Report given to oncoming shift RN Rosio Valdez), including  all care previously given. Addressed all questions asked by oncoming RN.

## 2021-10-31 NOTE — ED NOTES
Received report from off-going-shift RN (Chen),and assumed care of patient. Call bell with reach. Patient c/o headache; Provider informed.  See [facility]

## 2021-10-31 NOTE — ED NOTES
Chief Complaint   Patient presents with    Follow-up     review abnormal labs        Patient ID: Lisandro Garrison is a 82 y o  female  HPI  Pt is seeing for elev TSH -  Consistent for last 3 + years-  Noticed increased hair loss recently-  Lipids are high -  Had side effects with Lipitor in the past -  Did not try every other day -  On Fenofibrate     The following portions of the patient's history were reviewed and updated as appropriate: allergies, current medications, past family history, past medical history, past social history, past surgical history and problem list     Review of Systems   Constitutional: Negative  Respiratory: Negative  Cardiovascular: Negative  Gastrointestinal: Negative  Genitourinary: Negative  Musculoskeletal: Negative  Skin: Negative  Neurological: Negative  Current Outpatient Medications   Medication Sig Dispense Refill    albuterol (PROAIR HFA) 90 mcg/act inhaler Inhale 2 puffs every 6 (six) hours as needed for wheezing 1 Inhaler 2    amLODIPine (NORVASC) 5 mg tablet TAKE 1 TO 2 TABLETS BY MOUTH ONCE DAILY 60 tablet 6    ASMANEX 120 METERED DOSES 220 MCG/INH inhaler INHALE 2 PUFFS BY MOUTH ONCE DAILY 3 Inhaler 1    cycloSPORINE (RESTASIS) 0 05 % ophthalmic emulsion Administer 1 drop to both eyes 2 (two) times a day      fenofibrate (TRICOR) 145 mg tablet TAKE 1 TABLET BY MOUTH ONCE DAILY WITH FOOD 30 tablet 11    losartan-hydrochlorothiazide (HYZAAR) 100-12 5 MG per tablet TAKE 1 TABLET BY MOUTH ONCE DAILY 30 tablet 5    metoprolol succinate (TOPROL-XL) 100 mg 24 hr tablet TAKE 1 TABLET BY MOUTH AT BEDTIME 90 tablet 3     No current facility-administered medications for this visit          Objective:    /74 (BP Location: Right arm, Patient Position: Sitting, Cuff Size: Standard)   Pulse 68   Temp 97 6 °F (36 4 °C) (Tympanic)   Resp 14   Ht 4' 11" (1 499 m)   Wt 59 4 kg (131 lb)   BMI 26 46 kg/m²        Physical Exam   Constitutional: Pillow placed on head of bed. Patient is sleeping. She is oriented to person, place, and time  No distress  Cardiovascular: Normal rate and regular rhythm  No murmur heard  Pulmonary/Chest: Effort normal  No respiratory distress  Neurological: She is alert and oriented to person, place, and time  No cranial nerve deficit  Psychiatric: She has a normal mood and affect  Her behavior is normal          Labs in chart were reviewed    Recent Results (from the past 672 hour(s))   Comprehensive metabolic panel    Collection Time: 10/23/19  9:36 AM   Result Value Ref Range    Sodium 138 136 - 145 mmol/L    Potassium 4 2 3 5 - 5 3 mmol/L    Chloride 104 100 - 108 mmol/L    CO2 27 21 - 32 mmol/L    ANION GAP 7 4 - 13 mmol/L    BUN 30 (H) 5 - 25 mg/dL    Creatinine 1 30 0 60 - 1 30 mg/dL    Glucose, Fasting 121 (H) 65 - 99 mg/dL    Calcium 10 0 8 3 - 10 1 mg/dL    AST 18 5 - 45 U/L    ALT 27 12 - 78 U/L    Alkaline Phosphatase 56 46 - 116 U/L    Total Protein 7 3 6 4 - 8 2 g/dL    Albumin 4 0 3 5 - 5 0 g/dL    Total Bilirubin 0 40 0 20 - 1 00 mg/dL    eGFR 37 ml/min/1 73sq m   CBC    Collection Time: 10/23/19  9:36 AM   Result Value Ref Range    WBC 6 52 4 31 - 10 16 Thousand/uL    RBC 4 33 3 81 - 5 12 Million/uL    Hemoglobin 12 7 11 5 - 15 4 g/dL    Hematocrit 39 7 34 8 - 46 1 %    MCV 92 82 - 98 fL    MCH 29 3 26 8 - 34 3 pg    MCHC 32 0 31 4 - 37 4 g/dL    RDW 14 3 11 6 - 15 1 %    Platelets 532 (H) 429 - 390 Thousands/uL    MPV 11 0 8 9 - 12 7 fL   Lipid panel    Collection Time: 10/23/19  9:36 AM   Result Value Ref Range    Cholesterol 240 (H) 50 - 200 mg/dL    Triglycerides 233 (H) <=150 mg/dL    HDL, Direct 50 >=40 mg/dL    LDL Calculated 143 (H) 0 - 100 mg/dL    Non-HDL-Chol (CHOL-HDL) 190 mg/dl   TSH, 3rd generation    Collection Time: 10/23/19  9:36 AM   Result Value Ref Range    TSH 3RD GENERATON 6 460 (H) 0 358 - 3 740 uIU/mL       Assessment/Plan:         Diagnoses and all orders for this visit:    Mixed hyperlipidemia  -     rosuvastatin (CRESTOR) 5 mg tablet; Take 1 tablet (5 mg total) by mouth every other day  -     Lipid panel; Future  -     Comprehensive metabolic panel; Future    Subclinical hypothyroidism  -     levothyroxine 25 mcg tablet; Take 1 tablet (25 mcg total) by mouth daily  -     TSH, 3rd generation;  Future          rto in 2 m                   David Trujillo MD

## 2021-11-01 DIAGNOSIS — I65.22 LEFT CAROTID STENOSIS: Primary | ICD-10-CM

## 2021-11-01 DIAGNOSIS — I65.22 LEFT CAROTID STENOSIS: ICD-10-CM

## 2021-11-01 LAB
APTT PPP: 68.4 SEC (ref 23–36.4)
APTT PPP: 72.3 SEC (ref 23–36.4)
APTT PPP: 77.1 SEC (ref 23–36.4)
COVID-19 RAPID TEST, COVR: NOT DETECTED
SOURCE, COVRS: NORMAL

## 2021-11-01 PROCEDURE — 99223 1ST HOSP IP/OBS HIGH 75: CPT | Performed by: INTERNAL MEDICINE

## 2021-11-01 PROCEDURE — 74011250637 HC RX REV CODE- 250/637: Performed by: SURGERY

## 2021-11-01 PROCEDURE — 74011250637 HC RX REV CODE- 250/637: Performed by: INTERNAL MEDICINE

## 2021-11-01 PROCEDURE — 85730 THROMBOPLASTIN TIME PARTIAL: CPT

## 2021-11-01 PROCEDURE — 65660000000 HC RM CCU STEPDOWN

## 2021-11-01 PROCEDURE — 74011250637 HC RX REV CODE- 250/637: Performed by: STUDENT IN AN ORGANIZED HEALTH CARE EDUCATION/TRAINING PROGRAM

## 2021-11-01 PROCEDURE — 36415 COLL VENOUS BLD VENIPUNCTURE: CPT

## 2021-11-01 PROCEDURE — 74011250636 HC RX REV CODE- 250/636: Performed by: INTERNAL MEDICINE

## 2021-11-01 PROCEDURE — 2709999900 HC NON-CHARGEABLE SUPPLY

## 2021-11-01 PROCEDURE — 87635 SARS-COV-2 COVID-19 AMP PRB: CPT

## 2021-11-01 RX ORDER — GUAIFENESIN 100 MG/5ML
162 LIQUID (ML) ORAL DAILY
Status: DISCONTINUED | OUTPATIENT
Start: 2021-11-02 | End: 2021-11-02

## 2021-11-01 RX ORDER — BUPROPION HYDROCHLORIDE 150 MG/1
150 TABLET ORAL
Status: DISCONTINUED | OUTPATIENT
Start: 2021-11-02 | End: 2021-11-06 | Stop reason: HOSPADM

## 2021-11-01 RX ORDER — ACETAMINOPHEN 325 MG/1
650 TABLET ORAL
Status: DISCONTINUED | OUTPATIENT
Start: 2021-11-01 | End: 2021-11-06 | Stop reason: HOSPADM

## 2021-11-01 RX ORDER — NITROGLYCERIN 0.4 MG/1
0.4 TABLET SUBLINGUAL
Status: DISCONTINUED | OUTPATIENT
Start: 2021-11-01 | End: 2021-11-06 | Stop reason: HOSPADM

## 2021-11-01 RX ORDER — SODIUM CHLORIDE 9 MG/ML
75 INJECTION, SOLUTION INTRAVENOUS CONTINUOUS
Status: DISPENSED | OUTPATIENT
Start: 2021-11-01 | End: 2021-11-02

## 2021-11-01 RX ORDER — PRAVASTATIN SODIUM 20 MG/1
80 TABLET ORAL
Status: DISCONTINUED | OUTPATIENT
Start: 2021-11-01 | End: 2021-11-06 | Stop reason: HOSPADM

## 2021-11-01 RX ORDER — ENOXAPARIN SODIUM 100 MG/ML
40 INJECTION SUBCUTANEOUS EVERY 24 HOURS
Status: DISCONTINUED | OUTPATIENT
Start: 2021-11-01 | End: 2021-11-06 | Stop reason: HOSPADM

## 2021-11-01 RX ORDER — TOPIRAMATE 100 MG/1
100 TABLET, FILM COATED ORAL 2 TIMES DAILY
Status: DISCONTINUED | OUTPATIENT
Start: 2021-11-01 | End: 2021-11-06 | Stop reason: HOSPADM

## 2021-11-01 RX ORDER — PANTOPRAZOLE SODIUM 40 MG/1
40 TABLET, DELAYED RELEASE ORAL DAILY
Status: DISCONTINUED | OUTPATIENT
Start: 2021-11-02 | End: 2021-11-06 | Stop reason: HOSPADM

## 2021-11-01 RX ORDER — METOPROLOL SUCCINATE 25 MG/1
25 TABLET, EXTENDED RELEASE ORAL DAILY
Status: DISCONTINUED | OUTPATIENT
Start: 2021-11-02 | End: 2021-11-06 | Stop reason: HOSPADM

## 2021-11-01 RX ORDER — PROCHLORPERAZINE EDISYLATE 5 MG/ML
10 INJECTION INTRAMUSCULAR; INTRAVENOUS
Status: DISCONTINUED | OUTPATIENT
Start: 2021-11-01 | End: 2021-11-06 | Stop reason: HOSPADM

## 2021-11-01 RX ORDER — ADHESIVE BANDAGE
30 BANDAGE TOPICAL DAILY PRN
Status: DISCONTINUED | OUTPATIENT
Start: 2021-11-01 | End: 2021-11-06 | Stop reason: HOSPADM

## 2021-11-01 RX ORDER — NORTRIPTYLINE HYDROCHLORIDE 25 MG/1
75 CAPSULE ORAL
Status: DISCONTINUED | OUTPATIENT
Start: 2021-11-01 | End: 2021-11-06 | Stop reason: HOSPADM

## 2021-11-01 RX ORDER — IPRATROPIUM BROMIDE AND ALBUTEROL SULFATE 2.5; .5 MG/3ML; MG/3ML
3 SOLUTION RESPIRATORY (INHALATION)
Status: DISCONTINUED | OUTPATIENT
Start: 2021-11-01 | End: 2021-11-06 | Stop reason: HOSPADM

## 2021-11-01 RX ORDER — CLOPIDOGREL BISULFATE 75 MG/1
75 TABLET ORAL DAILY
Status: DISCONTINUED | OUTPATIENT
Start: 2021-11-01 | End: 2021-11-06 | Stop reason: HOSPADM

## 2021-11-01 RX ADMIN — ASPIRIN 81 MG CHEWABLE TABLET 81 MG: 81 TABLET CHEWABLE at 09:17

## 2021-11-01 RX ADMIN — TOPIRAMATE 100 MG: 100 TABLET, FILM COATED ORAL at 18:05

## 2021-11-01 RX ADMIN — NORTRIPTYLINE HYDROCHLORIDE 75 MG: 25 CAPSULE ORAL at 22:19

## 2021-11-01 RX ADMIN — SODIUM CHLORIDE 75 ML/HR: 900 INJECTION, SOLUTION INTRAVENOUS at 18:03

## 2021-11-01 RX ADMIN — CLOPIDOGREL BISULFATE 75 MG: 75 TABLET ORAL at 18:46

## 2021-11-01 RX ADMIN — PRAVASTATIN SODIUM 80 MG: 20 TABLET ORAL at 22:19

## 2021-11-01 NOTE — PROGRESS NOTES
Called by hospitalist regarding cta findings  Symptoms resolved  Will discuss with pt regarding intervention for symptomatic left carotid stenosis. Ilya Waqas

## 2021-11-01 NOTE — CONSULTS
Contacted regarding this patient who was transferred over for a possible TIA. His work-up did reveal an 80% ICA stenosis and for unclear reasons a heparin gtt was started. There is really no role for anticoagulation in this situation and instead DAPT would be the recommended treatment pending eval by vascular surgery. Mello José.  Shannan Platt 36. Neurophysiology  Neuromuscular Medicine

## 2021-11-01 NOTE — PROGRESS NOTES
Patient is not available to be assessed at this time. Marly De La Vega MDiv.   Claude Garrison   [phone]

## 2021-11-01 NOTE — ED NOTES
TRANSFER - OUT REPORT:    Verbal report given to Cori RNname) on Keith Méndez  being transferred to 408(unit) for routine progression of care       Report consisted of patients Situation, Background, Assessment and   Recommendations(SBAR). Information from the following report(s) ED Summary was reviewed with the receiving nurse. Lines:   Peripheral IV 10/30/21 Right Antecubital (Active)   Site Assessment Clean, dry, & intact 10/31/21 1900   Phlebitis Assessment 0 10/31/21 1900   Infiltration Assessment 0 10/31/21 1900   Dressing Status Clean, dry, & intact 10/31/21 1900   Dressing Type Tape;Transparent 10/31/21 1900   Hub Color/Line Status Pink; Infusing;Flushed;Patent 10/31/21 1900       Peripheral IV 10/30/21 Left Forearm (Active)   Site Assessment Clean, dry, & intact 10/31/21 1900   Phlebitis Assessment 0 10/31/21 1900   Infiltration Assessment 0 10/31/21 1900   Dressing Status Clean, dry, & intact 10/31/21 1900   Dressing Type Tape;Transparent 10/31/21 1900   Hub Color/Line Status Blue 10/31/21 1900        Opportunity for questions and clarification was provided.       Patient transported with:   Monitor   Heparin @15 units/kg/hr

## 2021-11-01 NOTE — ROUTINE PROCESS
Bedside and Verbal shift change report given to [address] Bar[address]olome[address] (oncoming nurse) by John Peters (offgoing nurse). Report included the following information SBAR, Kardex and MAR.

## 2021-11-01 NOTE — PROGRESS NOTES
Order for EEG placed per verbal order from Dr. Nils Osgood , needs to be done intraoperative for monitoring during surgery .

## 2021-11-01 NOTE — ROUTINE PROCESS
1815-admission assessment completed. 1947-/Bedside and Verbal shift change report given to RADHA (oncoming nurse) by Rosa Ko (offgoing nurse). Report included the following information SBAR, MAR and Recent Results.

## 2021-11-01 NOTE — H&P
History and Physical      Chief complaint: Headaches followed by right hand heaviness and dizziness while he was at work 2 days ago    Source of prevention: Patient and medical record    HPI:     Carlos Zuniga is a 64 y.o.  male who presented to [facility] emergency room on October 30, 2021 from work after patient noticed having right hand heaviness with dizziness. He stated he has history of migraine and started having headaches while he was at work and took Fioricet. He drove himself from work to emergency room. He has off-and-on headaches without any visual disturbances. No nausea or vomiting. No dysphagia. He also had some visual disturbances on the day when he had headaches followed by dizziness. He feels fine currently. No visual disturbances. No soreness of throat. No dysphagia. Denies any chest pain or shortness of breath or cough. No tingling or numbness. No nausea vomiting or abdominal pain. No bowel or bladder disturbances. No right-sided weakness currently. He has been taking Topamax 100 mg twice daily, nortriptyline and 150 mg Wellbutrin for migraine headaches at home. He has been taking aspirin, Plavix, metoprolol, Pravachol for his coronary artery disease and previous stroke. Drinks alcohol socially. He quit smoking 7 years 7 years ago. In the emergency room, CT head and MRI brain were negative. CTA head and neck showed 80% stenosis of left internal carotid artery. ED provider spoke to telemetry neurologist who recommended patient to be started on heparin drip. Past Medical History:   Diagnosis Date    Asthma     Autoimmune disease ([address]     psoriasis, ulcerative colitis    CAD in native artery     inf STEMI (april 2014) managed medically due to concomittant stroke; cath performed few weeks later: mild LM, LAD, RCA; 55% AVLCx (dominant).     Colitis     Colon cancer ([address]     Dyslipidemia     Heart attack ([address]     Hx of transesophageal echocardiography ([facility]) 2014    EF 50%. Basal-mid inferior hypk. No LA appendage thrombus. No R-L atrial septal shunt by IV contrast.  Mild ALYCE. No cardioembolic source for stroke.  Panic attack     S/P cardiac catheterization 2014    LM patent. LAD (sm) mild. AV-CX 55% w/linear filling defect prox to apex suggesting intimal tear. RCA very sm, nondom.  S/P carotid endarterectomy     R CEA (2014)    Stroke [facility]) 14    Parasthesis right saide, blindness left eye      Past Surgical History:   Procedure Laterality Date    HX APPENDECTOMY      HX HEART CATHETERIZATION      HX HERNIA REPAIR      HX ORTHOPAEDIC Left     Left wrist sx    NEUROLOGICAL PROCEDURE UNLISTED  2014    Cerbral Angiogram    VASCULAR SURGERY PROCEDURE UNLIST      R carotid endartectomy with angioplasty      Family History   Problem Relation Age of Onset    Heart Attack Father       Social History     Tobacco Use    Smoking status: Former Smoker     Packs/day: 1.00     Years: 41.00     Pack years: 41.00     Types: Cigarettes     Quit date: 2014     Years since quittin.5    Smokeless tobacco: Never Used   Substance Use Topics    Alcohol use: Yes     Comment: social--very light now       Prior to Admission medications    Medication Sig Start Date End Date Taking? Authorizing Provider   nortriptyline (PAMELOR) 50 mg capsule Take 3 Capsules by mouth. Provider, Historical   clopidogreL (PLAVIX) 75 mg tab Take 1 tablet by mouth once daily 21   Evan Porter MD   metoprolol succinate (TOPROL-XL) 25 mg XL tablet Take 1 Tab by mouth daily. 20   Evan Porter MD   buPROPion XL (WELLBUTRIN XL) 300 mg XL tablet Take 300 mg by mouth every morning. Provider, Historical   citalopram (CELEXA) 10 mg tablet TAKE 1 TABLET BY MOUTH ONCE DAILY 7/10/19   Provider, Historical   topiramate (TOPAMAX) 100 mg tablet Take 150 mg by mouth two (2) times a day.     Provider, Historical   pravastatin (PRAVACHOL) 80 mg tablet Take 80 mg by mouth nightly. Provider, Historical   pantoprazole (PROTONIX) 40 mg tablet Take 40 mg by mouth daily. Provider, Historical   aspirin 81 mg chewable tablet Take 162 mg by mouth daily. Provider, Historical   nitroglycerin (NITROSTAT) 0.4 mg SL tablet 1 Tab by SubLINGual route every five (5) minutes as needed for Chest Pain (up to 3 doses only). 4/29/14   Suri Castorena MD     Allergies   Allergen Reactions    Cat Dander Itching    Chlorhexidine Gluconate Itching    Dog Dander Itching    Lipitor [Atorvastatin] Other (comments)     dizziness    Seafood Swelling    Zocor [Simvastatin] Vertigo     Same as with lipitor, once on it for about two weeks experienced vertigo, stopped when he discontinued it        Review of Systems:    Review of Systems-  GENERAL: No fever, chills, malaise, weight changes  HEENT: No change in vision, no earache, sore throat or sinus congestion. NECK: No pain or stiffness. PULMONARY: No shortness of breath, cough or wheeze. Cardiovascular: no pnd or orthopnea, no CP  GASTROINTESTINAL: No abdominal pain, nausea, vomiting or diarrhea, melena or bright red blood per rectum. GENITOURINARY: No urinary frequency or dysuria. MUSCULOSKELETAL: No joint or muscle pain, no back pain. DERMATOLOGIC: No rash, no itching. ENDOCRINE: No polyuria, polydipsia, no heat or . NEUROLOGIC: Headaches present. No seizures, numbness, tingling or weakness. Objective: Intake and Output:    11/01 0701 - 11/01 1900  In: -   Out: 600 [Urine:600]  10/30 1901 - 11/01 0700  In: -   Out: 5055 [Urine:1350]    Physical Exam:     BP (!) 155/85 (BP 1 Location: Left upper arm, BP Patient Position: At rest)   Pulse 62   Temp 98.8 °F (37.1 °C)   Resp 18   Ht 5' 6\" (1.676 m)   Wt 72.9 kg (160 lb 12.8 oz)   SpO2 98%   BMI 25.95 kg/m²     General appearance -awake, follows commands appropriately, not in acute distress.   Eyes - sclera anicteric, no pallor  Nose - no obvious nasal discharge. Neck - supple, no JVD, trachea is midline  Chest -clear air entry noted in bases, no wheezes  Heart - S1 and S2 normal  Abdomen - soft, nontender, nondistended, Bowel sounds present  Neurological - alert, oriented, normal speech, no focal findings noted, good handgrips, no tremors noted. Musculoskeletal - no joint tenderness or erythema of knees bilaterally  Extremities - no pedal edema noted      Data Review:   Recent Results (from the past 24 hour(s))   PTT    Collection Time: 11/01/21  3:50 AM   Result Value Ref Range    aPTT 68.4 (H) 23.0 - 36.4 SEC   PTT    Collection Time: 11/01/21 11:15 AM   Result Value Ref Range    aPTT 72.3 (H) 23.0 - 36.4 SEC     CT Results  (Last 48 hours)    None            Assessment:     1. Right hand heaviness with visual disturbances due to #2  2. Complex migraine with TIA  3. Left internal carotid artery disease  4. History of right CEA  5. History of stroke with multiple chronic bilateral infarcts  6. Hypertension  7. Coronary artery disease  8. Dyslipidemia    Plan:     Patient will be admitted to telemetry floor. Spoke to neurologist and vascular surgeon. Will discontinue heparin drip. Continue aspirin and statin. Continue beta-blocker for hypertension and coronary artery disease. MRI brain is negative. CTA head report reviewed. Echocardiogram has been ordered. PT, OT and speech therapist to follow this patient. We will continue his home medications for migraine including Topamax, Wellbutrin, nortriptyline with as needed Tylenol. Patient wishes to be full code. Total time to take care of this patient was 70  minutes and more than 50% of time was spent counseling and coordinating care. Disclaimer: Sections of this note are dictated using utilizing voice recognition software, which may have resulted in some phonetic based errors in grammar and contents.  Even though attempts were made to correct all the mistakes, some may have been missed, and remained in the body of the document. If questions arise, please contact our department.

## 2021-11-02 ENCOUNTER — APPOINTMENT (OUTPATIENT)
Dept: NON INVASIVE DIAGNOSTICS | Age: 61
DRG: 038 | End: 2021-11-02
Attending: INTERNAL MEDICINE
Payer: COMMERCIAL

## 2021-11-02 ENCOUNTER — DOCUMENTATION ONLY (OUTPATIENT)
Dept: VASCULAR SURGERY | Age: 61
End: 2021-11-02

## 2021-11-02 LAB
ANION GAP SERPL CALC-SCNC: 4 MMOL/L (ref 3–18)
BASOPHILS # BLD: 0 K/UL (ref 0–0.1)
BASOPHILS NFR BLD: 1 % (ref 0–2)
BUN SERPL-MCNC: 11 MG/DL (ref 7–18)
BUN/CREAT SERPL: 9 (ref 12–20)
CALCIUM SERPL-MCNC: 8.1 MG/DL (ref 8.5–10.1)
CHLORIDE SERPL-SCNC: 111 MMOL/L (ref 100–111)
CO2 SERPL-SCNC: 24 MMOL/L (ref 21–32)
CREAT SERPL-MCNC: 1.2 MG/DL (ref 0.6–1.3)
DIFFERENTIAL METHOD BLD: ABNORMAL
ECHO AO ROOT DIAM: 3.25 CM
ECHO LA AREA 4C: 13.07 CM2
ECHO LA VOL 2C: 36.14 ML (ref 18–58)
ECHO LA VOL 4C: 28.67 ML (ref 18–58)
ECHO LA VOL BP: 34.11 ML (ref 18–58)
ECHO LA VOL/BSA BIPLANE: 18.74 ML/M2 (ref 16–28)
ECHO LA VOLUME INDEX A2C: 19.86 ML/M2 (ref 16–28)
ECHO LA VOLUME INDEX A4C: 15.75 ML/M2 (ref 16–28)
ECHO LV INTERNAL DIMENSION DIASTOLIC: 3.92 CM (ref 4.2–5.9)
ECHO LV INTERNAL DIMENSION SYSTOLIC: 3.1 CM
ECHO LV IVSD: 1.31 CM (ref 0.6–1)
ECHO LV MASS 2D: 171.7 G (ref 88–224)
ECHO LV MASS INDEX 2D: 94.3 G/M2 (ref 49–115)
ECHO LV POSTERIOR WALL DIASTOLIC: 1.2 CM (ref 0.6–1)
ECHO LVOT DIAM: 1.9 CM
ECHO LVOT PEAK GRADIENT: 2.14 MMHG
ECHO LVOT PEAK VELOCITY: 73.16 CM/S
ECHO LVOT SV: 45.3 ML
ECHO LVOT VTI: 16.01 CM
ECHO MV A VELOCITY: 79.72 CM/S
ECHO MV E DECELERATION TIME (DT): 237.46 MS
ECHO MV E VELOCITY: 49.14 CM/S
ECHO MV E/A RATIO: 0.62
EOSINOPHIL # BLD: 0.3 K/UL (ref 0–0.4)
EOSINOPHIL NFR BLD: 5 % (ref 0–5)
ERYTHROCYTE [DISTWIDTH] IN BLOOD BY AUTOMATED COUNT: 13.2 % (ref 11.6–14.5)
GLUCOSE SERPL-MCNC: 94 MG/DL (ref 74–99)
HCT VFR BLD AUTO: 42.1 % (ref 36–48)
HGB BLD-MCNC: 13.6 G/DL (ref 13–16)
LVOT MG: 1.29 MMHG
LYMPHOCYTES # BLD: 1.4 K/UL (ref 0.9–3.6)
LYMPHOCYTES NFR BLD: 21 % (ref 21–52)
MAGNESIUM SERPL-MCNC: 2.1 MG/DL (ref 1.6–2.6)
MCH RBC QN AUTO: 30.8 PG (ref 24–34)
MCHC RBC AUTO-ENTMCNC: 32.3 G/DL (ref 31–37)
MCV RBC AUTO: 95.2 FL (ref 78–100)
MONOCYTES # BLD: 0.8 K/UL (ref 0.05–1.2)
MONOCYTES NFR BLD: 12 % (ref 3–10)
NEUTS SEG # BLD: 4 K/UL (ref 1.8–8)
NEUTS SEG NFR BLD: 61 % (ref 40–73)
PLATELET # BLD AUTO: 171 K/UL (ref 135–420)
PMV BLD AUTO: 10.3 FL (ref 9.2–11.8)
POTASSIUM SERPL-SCNC: 4.1 MMOL/L (ref 3.5–5.5)
RBC # BLD AUTO: 4.42 M/UL (ref 4.35–5.65)
SODIUM SERPL-SCNC: 139 MMOL/L (ref 136–145)
WBC # BLD AUTO: 6.5 K/UL (ref 4.6–13.2)

## 2021-11-02 PROCEDURE — 99223 1ST HOSP IP/OBS HIGH 75: CPT | Performed by: PSYCHIATRY & NEUROLOGY

## 2021-11-02 PROCEDURE — 65660000000 HC RM CCU STEPDOWN

## 2021-11-02 PROCEDURE — 74011250637 HC RX REV CODE- 250/637: Performed by: SURGERY

## 2021-11-02 PROCEDURE — 74011250636 HC RX REV CODE- 250/636: Performed by: INTERNAL MEDICINE

## 2021-11-02 PROCEDURE — 74011250637 HC RX REV CODE- 250/637: Performed by: INTERNAL MEDICINE

## 2021-11-02 PROCEDURE — 97165 OT EVAL LOW COMPLEX 30 MIN: CPT

## 2021-11-02 PROCEDURE — 2709999900 HC NON-CHARGEABLE SUPPLY

## 2021-11-02 PROCEDURE — 80048 BASIC METABOLIC PNL TOTAL CA: CPT

## 2021-11-02 PROCEDURE — 85025 COMPLETE CBC W/AUTO DIFF WBC: CPT

## 2021-11-02 PROCEDURE — 83735 ASSAY OF MAGNESIUM: CPT

## 2021-11-02 PROCEDURE — 36415 COLL VENOUS BLD VENIPUNCTURE: CPT

## 2021-11-02 PROCEDURE — 74011000250 HC RX REV CODE- 250: Performed by: HOSPITALIST

## 2021-11-02 PROCEDURE — 99232 SBSQ HOSP IP/OBS MODERATE 35: CPT | Performed by: HOSPITALIST

## 2021-11-02 PROCEDURE — 93306 TTE W/DOPPLER COMPLETE: CPT

## 2021-11-02 RX ORDER — GUAIFENESIN 100 MG/5ML
324 LIQUID (ML) ORAL DAILY
Status: DISCONTINUED | OUTPATIENT
Start: 2021-11-03 | End: 2021-11-05

## 2021-11-02 RX ORDER — SODIUM CHLORIDE 9 MG/ML
10 INJECTION INTRAMUSCULAR; INTRAVENOUS; SUBCUTANEOUS
Status: COMPLETED | OUTPATIENT
Start: 2021-11-02 | End: 2021-11-02

## 2021-11-02 RX ADMIN — TOPIRAMATE 100 MG: 100 TABLET, FILM COATED ORAL at 18:00

## 2021-11-02 RX ADMIN — CLOPIDOGREL BISULFATE 75 MG: 75 TABLET ORAL at 08:32

## 2021-11-02 RX ADMIN — METOPROLOL SUCCINATE 25 MG: 25 TABLET, EXTENDED RELEASE ORAL at 08:38

## 2021-11-02 RX ADMIN — ASPIRIN 81 MG CHEWABLE TABLET 162 MG: 81 TABLET CHEWABLE at 08:38

## 2021-11-02 RX ADMIN — TOPIRAMATE 100 MG: 100 TABLET, FILM COATED ORAL at 08:32

## 2021-11-02 RX ADMIN — NORTRIPTYLINE HYDROCHLORIDE 75 MG: 25 CAPSULE ORAL at 22:23

## 2021-11-02 RX ADMIN — PANTOPRAZOLE SODIUM 40 MG: 40 TABLET, DELAYED RELEASE ORAL at 08:38

## 2021-11-02 RX ADMIN — PRAVASTATIN SODIUM 80 MG: 20 TABLET ORAL at 22:23

## 2021-11-02 RX ADMIN — SODIUM CHLORIDE 10 ML: 9 INJECTION INTRAMUSCULAR; INTRAVENOUS; SUBCUTANEOUS at 11:27

## 2021-11-02 RX ADMIN — BUPROPION HYDROCHLORIDE 150 MG: 150 TABLET, FILM COATED, EXTENDED RELEASE ORAL at 06:48

## 2021-11-02 RX ADMIN — SODIUM CHLORIDE 75 ML/HR: 900 INJECTION, SOLUTION INTRAVENOUS at 06:48

## 2021-11-02 NOTE — CONSULTS
NEUROLOGY CONSULT NOTE    Patient ID:  Abida Allred  [id number]  50 y.o.  1960    Date of Consultation:  November 2, 2021    Referring Physician: Dr Danielle Adrian    Reason for Consultation:  Transient neurologic symptoms        Subjective:       History of Present Illness:     Abida Allred is a 64 y.o.  male who presented to [facility] emergency room on October 30, 2021 from work after patient noticed having right hand heaviness with dizziness with a headache and some double vision. He stated he has history of migraine and started having headaches while he was at work and took Fioricet. He drove himself from work to emergency room. He has off-and-on headaches without any visual disturbances. No nausea or vomiting. No dysphagia. He also had some visual disturbances on the day when he had headaches followed by dizziness.     He feels fine currently. No visual disturbances. No soreness of throat. No dysphagia. Denies any chest pain or shortness of breath or cough. No tingling or numbness. No nausea vomiting or abdominal pain. No bowel or bladder disturbances. No right-sided weakness currently. He has been taking Topamax 100 mg twice daily, nortriptyline and 150 mg Wellbutrin for migraine headaches at home. He has been taking aspirin, Plavix, metoprolol, Pravachol for his coronary artery disease and previous stroke. Drinks alcohol socially. He quit smoking 7 years 7 years ago. He had suffered prior strokes back in 2014 when he had an MI and a complete right ICA occlusion. He underwent successful revascularization at that time and has a residual left lower quadrant field deficit as his only remaining issue.      In the emergency room, CT head and MRI brain were negative. CTA head and neck showed 80% stenosis of left internal carotid artery.   He was on  and plavix 75mg prior to this admission.        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     psoriasis, ulcerative colitis    CAD in native artery     inf STEMI (april 2014) managed medically due to concomittant stroke; cath performed few weeks later: mild LM, LAD, RCA; 55% AVLCx (dominant).  Colitis     Colon cancer [facility]     Dyslipidemia     Heart attack ([facility]     Hx of transesophageal echocardiography ([facility]) 04/28/2014    EF 50%. Basal-mid inferior hypk. No LA appendage thrombus. No R-L atrial septal shunt by IV contrast.  Mild ALYCE. No cardioembolic source for stroke.  Panic attack     S/P cardiac catheterization 05/06/2014    LM patent. LAD (sm) mild. AV-CX 55% w/linear filling defect prox to apex suggesting intimal tear. RCA very sm, nondom.  S/P carotid endarterectomy     R CEA (July 2014)    Stroke [facility]) 4/23/14    Parasthesis right saide, blindness left eye      Past Surgical History:   Procedure Laterality Date    HX APPENDECTOMY      HX HEART CATHETERIZATION      HX HERNIA REPAIR      HX ORTHOPAEDIC Left     Left wrist sx    NEUROLOGICAL PROCEDURE UNLISTED  6/2014    Cerbral Angiogram    VASCULAR SURGERY PROCEDURE UNLIST      R carotid endartectomy with angioplasty       Prior to Admission medications    Medication Sig Start Date End Date Taking?  Authorizing Provider   nortriptyline (PAMELOR) 50 mg capsule Take 3 Capsules by mouth. Yes Provider, Historical   clopidogreL (PLAVIX) 75 mg tab Take 1 tablet by mouth once daily 21  Yes Karen Chambers MD   metoprolol succinate (TOPROL-XL) 25 mg XL tablet Take 1 Tab by mouth daily. 20  Yes Karen Chambers MD   buPROPion XL (WELLBUTRIN XL) 300 mg XL tablet Take 300 mg by mouth every morning. Yes Provider, Historical   citalopram (CELEXA) 10 mg tablet TAKE 1 TABLET BY MOUTH ONCE DAILY 7/10/19  Yes Provider, Historical   topiramate (TOPAMAX) 100 mg tablet Take 150 mg by mouth two (2) times a day. Yes Provider, Historical   pravastatin (PRAVACHOL) 80 mg tablet Take 80 mg by mouth nightly. Yes Provider, Historical   aspirin 81 mg chewable tablet Take 162 mg by mouth daily. Yes Provider, Historical   pantoprazole (PROTONIX) 40 mg tablet Take 40 mg by mouth daily. Patient not taking: Reported on 2021    Provider, Historical   nitroglycerin (NITROSTAT) 0.4 mg SL tablet 1 Tab by SubLINGual route every five (5) minutes as needed for Chest Pain (up to 3 doses only).  14   Sobia Mars MD     Allergies   Allergen Reactions    Cat Dander Itching    Chlorhexidine Gluconate Itching    Dog Dander Itching    Lipitor [Atorvastatin] Other (comments)     dizziness    Seafood Swelling    Zocor [Simvastatin] Vertigo     Same as with lipitor, once on it for about two weeks experienced vertigo, stopped when he discontinued it      Social History     Tobacco Use    Smoking status: Former Smoker     Packs/day: 1.00     Years: 41.00     Pack years: 41.00     Types: Cigarettes     Quit date: 2014     Years since quittin.5    Smokeless tobacco: Never Used   Substance Use Topics    Alcohol use: Yes     Comment: social--very light now      Family History   Problem Relation Age of Onset    Heart Attack Father                 Review of Systems-  GENERAL: No fever, chills, malaise, weight changes  HEENT: No change in vision, no earache, sore throat or sinus congestion. +headache  NECK: No pain or stiffness. PULMONARY: No shortness of breath, cough or wheeze. Cardiovascular: no pnd or orthopnea, no CP  GASTROINTESTINAL: No abdominal pain, nausea, vomiting or diarrhea, melena or bright red blood per rectum. GENITOURINARY: No urinary frequency or dysuria. MUSCULOSKELETAL: No joint or muscle pain, no back pain. DERMATOLOGIC: No rash, no itching. ENDOCRINE: No polyuria, polydipsia, no heat or . NEUROLOGIC:   No seizures, numbness, +paresthesia of right hand. No weakness.          Objective:     Patient Vitals for the past 8 hrs:   BP Temp Pulse Resp SpO2 Height Weight   11/02/21 1047 104/69 -- -- -- -- 5' 6\" (1.676 m) 72.6 kg (160 lb)   11/02/21 0737 (!) 147/82 97.4 °F (36.3 °C) 78 18 97 % -- --       General Exam  No acute distress, normal body habitus    HEENT: Normocephalic, atraumatic, Sclera anicteric, normal conjunctiva  Mucous membranes: normal color and hydration   Lungs: clear. Vascular: No carotid bruits. Skin: no lesions no rashes, normal color  CV: No carotid bruits,   Heart: regular to rate and rhythm. No murmurs     Neurologic Exam:    Mental status:  Alert, oriented to person, place, time and circumstance  No visual spatial neglect or overt apraxia    Language: normal fluency and comprehension    Cranial nerves: PERRL, Left homonymous inferior quadrantaopsia.  Extraocular movements intact and full, face symmetric to movement, Tongue midline with normal strength, palat symmetric    Motor: strength 5/5 throughout  No abnormal movements    Coordination: Normal finger-nose-finger, Normal rapid alternating movements    DTRs (R/L)  Biceps: (2/3)  Brachorad (2/2)    Patellar (2/2)  Ankles (2/2)      Sensation: Intact and symmetric to light touch in hands    right pronator drift    Gait: not tested    Data Review:    Recent Results (from the past 24 hour(s))   PTT    Collection Time: 11/01/21  6:20 PM   Result Value Ref Range    aPTT 77.1 (H) 23.0 - 36.4 SEC   COVID-19 RAPID TEST    Collection Time: 11/01/21  6:26 PM   Result Value Ref Range    Specimen source Nasopharyngeal      COVID-19 rapid test Not detected NOTD     CBC WITH AUTOMATED DIFF    Collection Time: 11/02/21  5:07 AM   Result Value Ref Range    WBC 6.5 4.6 - 13.2 K/uL    RBC 4.42 4.35 - 5.65 M/uL    HGB 13.6 13.0 - 16.0 g/dL    HCT 42.1 36.0 - 48.0 %    MCV 95.2 78.0 - 100.0 FL    MCH 30.8 24.0 - 34.0 PG    MCHC 32.3 31.0 - 37.0 g/dL    RDW 13.2 11.6 - 14.5 %    PLATELET 135 435 - 862 K/uL    MPV 10.3 9.2 - 11.8 FL    NEUTROPHILS 61 40 - 73 %    LYMPHOCYTES 21 21 - 52 %    MONOCYTES 12 (H) 3 - 10 %    EOSINOPHILS 5 0 - 5 %    BASOPHILS 1 0 - 2 %    ABS. NEUTROPHILS 4.0 1.8 - 8.0 K/UL    ABS. LYMPHOCYTES 1.4 0.9 - 3.6 K/UL    ABS. MONOCYTES 0.8 0.05 - 1.2 K/UL    ABS. EOSINOPHILS 0.3 0.0 - 0.4 K/UL    ABS.  BASOPHILS 0.0 0.0 - 0.1 K/UL    DF AUTOMATED     METABOLIC PANEL, BASIC    Collection Time: 11/02/21  5:07 AM   Result Value Ref Range    Sodium 139 136 - 145 mmol/L    Potassium 4.1 3.5 - 5.5 mmol/L    Chloride 111 100 - 111 mmol/L    CO2 24 21 - 32 mmol/L    Anion gap 4 3.0 - 18 mmol/L    Glucose 94 74 - 99 mg/dL    BUN 11 7.0 - 18 MG/DL    Creatinine 1.20 0.6 - 1.3 MG/DL    BUN/Creatinine ratio 9 (L) 12 - 20      GFR est AA >60 >60 ml/min/1.73m2    GFR est non-AA >60 >60 ml/min/1.73m2    Calcium 8.1 (L) 8.5 - 10.1 MG/DL   MAGNESIUM    Collection Time: 11/02/21  5:07 AM   Result Value Ref Range    Magnesium 2.1 1.6 - 2.6 mg/dL   ECHO ADULT COMPLETE    Collection Time: 11/02/21 11:26 AM   Result Value Ref Range    IVSd 1.31 (A) 0.60 - 1.00 cm    LVIDd 3.92 (A) 4.20 - 5.90 cm    LVIDs 3.10 cm    LVOT d 1.90 cm    LVPWd 1.20 (A) 0.60 - 1.00 cm    LVOT Peak Gradient 2.14 mmHg    Left Ventricular Outflow Tract Mean Gradient 1.29 mmHg    LVOT SV 45.3 mL    LVOT Peak Velocity 73.16 cm/s    LVOT VTI 16.01 cm    LA Volume 34.11 18.0 - 58.0 mL    LA Area 4C 13.07 cm2    LA Vol 2C 36.14 18.00 - 58.00 mL    LA Vol 4C 28.67 18.00 - 58.00 mL    MV A Hernando 79.72 cm/s    Mitral Valve E Wave Deceleration Time 237.46 ms    MV E Hernando 49.14 cm/s    Ao Root D 3.25 cm    MV E/A 0.62     LV Mass .7 88.0 - 224.0 g    LV Mass AL Index 94.3 49.0 - 115.0 g/m2    LA Vol Index 18.74 16.00 - 28.00 ml/m2    LA Vol Index 19.86 16.00 - 28.00 ml/m2    LA Vol Index 15.75 16.00 - 28.00 ml/m2         Radiology studies: MRI and CTAs reviewed      Assessment: This is a 65 y/o WM with a history of CAD and prior right ICA occlusion and right hemispheric strokes who presented with transient right hand symptoms and some dizziness. This is/was likely due to a TIA or could be reactivation from a prior left hemispheric stroke or also he could have simply been relatively hypotensive. Certainly he has significant JESSICA disease and requires intervention. Agree with re-vascularization and there is no clear reason to delay this given the lack of any acute stroke. Active Problems:    Acute right-sided weakness (10/30/2021)        Plan:     1. /Plavix 75mg  2. Will send off UA to exclude some other process reactivating his prior stroke  3. Will obtain orthostatics. Yue Rossi M.D.   Clinical Neurophysiology  Neuromuscular specialist

## 2021-11-02 NOTE — PROGRESS NOTES
[facility]ist [facility]  Progress Note    Patient: Jim Spann Age: 64 y.o. : 1960 MR#: [id number] SSN: [id number]  Date/Time: 2021     Subjective:   Pt feels well. HA has resolved. Notes residual 'heaviness' of right hand. Denies other neuro deficits. .   Assessment/Plan:     65 y/o male with hx of CAD, PAD, TIA, and right CEA presenting with symptomatic left carotid stenosis being admitted for planned left CEA. #Left ICA stenosis: 80% and symptomatic with HA and right sided heaviness. Plan for CEA, Date TBD  - Vascular and neurology consulted  - CEA on date TBD  - Home ASA 162mg  - Home Plavix 75mg  - Consider Switching statin to Rosuvastatin (intolerant of atorvastatin. But given clinical ASCVD and  pt recommended high intensity statin with goal LDL <70.)     #CAD:   - Antiplatelet as above  - Home Toprol 25mg     #Elevated BP: Intermittently elevated. Acceptable at present given severe carotid stenosis. Will consider antihypertensives if needed after CEA. #Headaches:   - Home Topomax 100mg  - Home nortriptyline 50mg     #Mood Disorder:   - Home Wellbutrin   - holding home Celexa     #GERD:   - Home protonix      Code: Full   DVT: Lovenox (held prior to procedure), SCD  Objective:   VS:   Visit Vitals  BP (!) 147/82   Pulse 78   Temp 97.4 °F (36.3 °C)   Resp 18   Ht 5' 6\" (1.676 m)   Wt 72.9 kg (160 lb 12.8 oz)   SpO2 97%   BMI 25.95 kg/m²      Tmax/24hrs: Temp (24hrs), Av °F (36.7 °C), Min:97.4 °F (36.3 °C), Max:98.8 °F (37.1 °C)  IOBRIEF    Intake/Output Summary (Last 24 hours) at 2021 1006  Last data filed at 2021 0651  Gross per 24 hour   Intake 948.75 ml   Output 1200 ml   Net -251.25 ml       General:  Alert, cooperative, no acute distress    HEENT: PERRLA, anicteric sclerae. Pulmonary:  CTA Bilaterally. No Wheezing/Rhonchi/Rales. Cardiovascular: Regular rate and Rhythm.   GI:  Soft, Non distended, Non tender. + Bowel sounds. Extremities:  No edema, cyanosis, clubbing. No calf tenderness. Psych: Good insight. Not anxious or agitated. Neurologic: Alert and oriented X 3. No acute neuro deficits.   Additional:    Medications:   Current Facility-Administered Medications   Medication Dose Route Frequency    acetaminophen (TYLENOL) tablet 650 mg  650 mg Oral Q6H PRN    prochlorperazine (COMPAZINE) injection 10 mg  10 mg IntraVENous Q6H PRN    magnesium hydroxide (MILK OF MAGNESIA) 400 mg/5 mL oral suspension 30 mL  30 mL Oral DAILY PRN    albuterol-ipratropium (DUO-NEB) 2.5 MG-0.5 MG/3 ML  3 mL Nebulization Q4H PRN    buPROPion XL (WELLBUTRIN XL) tablet 150 mg  150 mg Oral 7am    aspirin chewable tablet 162 mg  162 mg Oral DAILY    metoprolol succinate (TOPROL-XL) XL tablet 25 mg  25 mg Oral DAILY    nitroglycerin (NITROSTAT) tablet 0.4 mg  0.4 mg SubLINGual Q5MIN PRN    nortriptyline (PAMELOR) capsule 75 mg  75 mg Oral QHS    pravastatin (PRAVACHOL) tablet 80 mg  80 mg Oral QHS    pantoprazole (PROTONIX) tablet 40 mg  40 mg Oral DAILY    topiramate (TOPAMAX) tablet 100 mg  100 mg Oral BID    [Held by provider] enoxaparin (LOVENOX) injection 40 mg  40 mg SubCUTAneous Q24H    clopidogreL (PLAVIX) tablet 75 mg  75 mg Oral DAILY       Labs:    Recent Results (from the past 24 hour(s))   PTT    Collection Time: 11/01/21 11:15 AM   Result Value Ref Range    aPTT 72.3 (H) 23.0 - 36.4 SEC   PTT    Collection Time: 11/01/21  6:20 PM   Result Value Ref Range    aPTT 77.1 (H) 23.0 - 36.4 SEC   COVID-19 RAPID TEST    Collection Time: 11/01/21  6:26 PM   Result Value Ref Range    Specimen source Nasopharyngeal      COVID-19 rapid test Not detected NOTD     CBC WITH AUTOMATED DIFF    Collection Time: 11/02/21  5:07 AM   Result Value Ref Range    WBC 6.5 4.6 - 13.2 K/uL    RBC 4.42 4.35 - 5.65 M/uL    HGB 13.6 13.0 - 16.0 g/dL    HCT 42.1 36.0 - 48.0 %    MCV 95.2 78.0 - 100.0 FL    MCH 30.8 24.0 - 34.0 PG    MCHC 32.3 31.0 - 37.0 g/dL    RDW 13.2 11.6 - 14.5 %    PLATELET 772 313 - 838 K/uL    MPV 10.3 9.2 - 11.8 FL    NEUTROPHILS 61 40 - 73 %    LYMPHOCYTES 21 21 - 52 %    MONOCYTES 12 (H) 3 - 10 %    EOSINOPHILS 5 0 - 5 %    BASOPHILS 1 0 - 2 %    ABS. NEUTROPHILS 4.0 1.8 - 8.0 K/UL    ABS. LYMPHOCYTES 1.4 0.9 - 3.6 K/UL    ABS. MONOCYTES 0.8 0.05 - 1.2 K/UL    ABS. EOSINOPHILS 0.3 0.0 - 0.4 K/UL    ABS.  BASOPHILS 0.0 0.0 - 0.1 K/UL    DF AUTOMATED     METABOLIC PANEL, BASIC    Collection Time: 11/02/21  5:07 AM   Result Value Ref Range    Sodium 139 136 - 145 mmol/L    Potassium 4.1 3.5 - 5.5 mmol/L    Chloride 111 100 - 111 mmol/L    CO2 24 21 - 32 mmol/L    Anion gap 4 3.0 - 18 mmol/L    Glucose 94 74 - 99 mg/dL    BUN 11 7.0 - 18 MG/DL    Creatinine 1.20 0.6 - 1.3 MG/DL    BUN/Creatinine ratio 9 (L) 12 - 20      GFR est AA >60 >60 ml/min/1.73m2    GFR est non-AA >60 >60 ml/min/1.73m2    Calcium 8.1 (L) 8.5 - 10.1 MG/DL   MAGNESIUM    Collection Time: 11/02/21  5:07 AM   Result Value Ref Range    Magnesium 2.1 1.6 - 2.6 mg/dL       Signed By: Honorio Eubanks MD     November 2, 2021

## 2021-11-02 NOTE — PROGRESS NOTES
PT orders received and chart reviewed. PT eval attempted at 1345,  pt reporting ambulating independently, reporting improvements in weakness. Pt with no questions or concerns regarding mobility at this time. Discussed if change in mobility to inform physician for new PT order. Will sign off.     Thank you for this referral  Maryana Orozco  PT DPT

## 2021-11-02 NOTE — PROGRESS NOTES
SLP Note:    SLP evaluation orders received. Upon chart review and discussion with pt, no skilled SLP evaluation indicated at this time. Pt tolerating regular diet with thin liquids with no reported distress (passed dysphagia screen in ED). Speech and voice within functional limits. Educated with regard to role of SLP, s/sx aspiration and to alert MD/RN if symptoms arise. Pt verbalized understanding. Will sign off.        Thank you for this referral,   Rose Marie Blue M.S., Donny Less  Speech-Language Pathologist

## 2021-11-02 NOTE — PROGRESS NOTES
Reason for Admission:  Acute right-sided weakness [R53.1]                 RUR Score:    7            Plan for utilizing home health:    no                      Likelihood of Readmission:   LOW                         Transition of Care Plan:              Initial assessment completed with patient. Cognitive status of patient: oriented to time, place, person and situation. Face sheet information confirmed:  yes. The patient designates his cousin to participate in his discharge plan and to receive any needed information. This patient lives in a single family home alone. Patient is able to navigate steps as needed. Prior to hospitalization, patient was considered to be independent with ADLs/IADLS : yes . Patient has a current ACP document on file: yes      Healthcare Decision Maker:     Click here to complete [address] including selection of the Healthcare Decision Maker Relationship (ie \"Primary\")    The patients cousin will be available to transport patient home upon discharge. The patient already has no medical equipment available in the home. Patient is not currently active with home health. Patient has not stayed in a skilled nursing facility or rehab. This patient is on dialysis :no    Currently, the discharge plan is Home. The patient states that he can obtain his medications from the pharmacy, and take his medications as directed. Patient's current insurance is [employer]       Care Management Interventions  PCP Verified by CM:  Yes  Mode of Transport at Discharge: Self  Physical Therapy Consult: Yes  Occupational Therapy Consult: Yes  Support Systems: Other Family Member(s)  Confirm Follow Up Transport: Family  The Plan for Transition of Care is Related to the Following Treatment Goals : Home  The Patient and/or Patient Representative was Provided with a Choice of Provider and Agrees with the Discharge Plan?: Yes  Freedom of Choice List was Provided with Basic Dialogue that Supports the Patient's Individualized Plan of Care/Goals, Treatment Preferences and Shares the Quality Data Associated with the Providers?: Yes  Discharge Location  Discharge Placement: [facility] Mingo Gonzalez RN

## 2021-11-02 NOTE — ROUTINE PROCESS
Bedside and Verbal shift change report given to Doni Carney LPN (oncoming nurse) by Philip Weaver RN   (offgoing nurse). Report included the following information SBAR, Kardex, Intake/Output, MAR and Recent Results.

## 2021-11-02 NOTE — CONSULTS
[address] [facility]    Chief Complaint   Patient presents with    Headache    Fatigue       History and Physical    63 y/o admitted with right hand numbness and weakness, right leg weakness, and some speech difficulty. Has been having symptoms for some time now. Has been followed on BMT annually. Critical stenosis on the left is a new finding this year. Was seem by Lynda Mendosa for left carotid stenosis with numbness in right hand and some word difficulty on October. Left CEA planned for late November. Non smoker, CAD, UC, asthma, and history of symptomatic right carotid repaired by me in 4880 without complication. Past Medical History:   Diagnosis Date    Asthma     Autoimmune disease ([facility] [address]     psoriasis, ulcerative colitis    CAD in native artery     inf STEMI (april 2014) managed medically due to concomittant stroke; cath performed few weeks later: mild LM, LAD, RCA; 55% AVLCx (dominant).  Colitis     Colon cancer ([facility] [address]     Dyslipidemia     Heart attack ([facility] [address]     Hx of transesophageal echocardiography ([facility]) 04/28/2014    EF 50%. Basal-mid inferior hypk. No LA appendage thrombus. No R-L atrial septal shunt by IV contrast.  Mild ALYCE. No cardioembolic source for stroke.  Panic attack     S/P cardiac catheterization 05/06/2014    LM patent. LAD (sm) mild. AV-CX 55% w/linear filling defect prox to apex suggesting intimal tear. RCA very sm, nondom.      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 residual moderate stenosis [id number]    Hemianopia, homonymous, left H53.462    History of ST elevation myocardial infarction (STEMI) I25.2    Vertigo R42    Headache(784.0) R51    Paresthesia of right arm R20.2    Migraine, unspecified, with intractable migraine, so stated, without mention of status migrainosus G43.919    CAD in native artery I25.10    S/P carotid endarterectomy Z98.890    Colitis K52.9    Dyslipidemia E78.5    CVA, old, disturbances of vision I69.398, H53.9    Borderline hypertension R03.0    Acute right-sided weakness R53.1     Past Surgical History:   Procedure Laterality Date    HX APPENDECTOMY      HX HEART CATHETERIZATION      HX HERNIA REPAIR      HX ORTHOPAEDIC Left     Left wrist sx    NEUROLOGICAL PROCEDURE UNLISTED  6/2014    Cerbral Angiogram    VASCULAR SURGERY PROCEDURE UNLIST      R carotid endartectomy with angioplasty      Current Facility-Administered Medications   Medication Dose Route Frequency Provider Last Rate Last Admin    acetaminophen (TYLENOL) tablet 650 mg  650 mg Oral Q6H PRN Sofía Willis MD        prochlorperazine (COMPAZINE) injection 10 mg  10 mg IntraVENous Q6H PRN Sofía Willis MD        magnesium hydroxide (MILK OF MAGNESIA) 400 mg/5 mL oral suspension 30 mL  30 mL Oral DAILY PRN Sofía Willis MD        albuterol-ipratropium (DUO-NEB) 2.5 MG-0.5 MG/3 ML  3 mL Nebulization Q4H PRN Sofía Willis MD        buPROPion XL (WELLBUTRIN XL) tablet 150 mg  150 mg Oral 7am Sofía Willis MD   150 mg at 11/02/21 5882    aspirin chewable tablet 162 mg  162 mg Oral DAILY Sofía Willis MD   162 mg at 11/02/21 0838    metoprolol succinate (TOPROL-XL) XL tablet 25 mg  25 mg Oral DAILY Sofía Willis MD   25 mg at 11/02/21 0838    nitroglycerin (NITROSTAT) tablet 0.4 mg  0.4 mg SubLINGual Q5MIN PRN Sofía Willis MD        nortriptyline (PAMELOR) capsule 75 mg  75 mg Oral QHS Sofía Willis MD   75 mg at 11/01/21 2219    pravastatin (PRAVACHOL) tablet 80 mg  80 mg Oral QHS Sofía Willis MD   80 mg at 11/01/21 2219    pantoprazole (PROTONIX) tablet 40 mg  40 mg Oral DAILY Sofía Willis MD   40 mg at 11/02/21 0838    topiramate (TOPAMAX) tablet 100 mg  100 mg Oral BID Sofía Willis MD   100 mg at 11/02/21 0832    enoxaparin (LOVENOX) injection 40 mg  40 mg SubCUTAneous Q24H Mary Ventura MD        clopidogreL (PLAVIX) tablet 75 mg  75 mg Oral DAILY Carmen Mchugh MD   75 mg at 21 2687     Allergies   Allergen Reactions    Cat Dander Itching    Chlorhexidine Gluconate Itching    Dog Dander Itching    Lipitor [Atorvastatin] Other (comments)     dizziness    Seafood Swelling    Zocor [Simvastatin] Vertigo     Same as with lipitor, once on it for about two weeks experienced vertigo, stopped when he discontinued it     Social History     Socioeconomic History    Marital status: SINGLE     Spouse name: Not on file    Number of children: Not on file    Years of education: Not on file    Highest education level: Not on file   Occupational History    Not on file   Tobacco Use    Smoking status: Former Smoker     Packs/day: 1.00     Years: 41.00     Pack years: 41.00     Types: Cigarettes     Quit date: 2014     Years since quittin.5    Smokeless tobacco: Never Used   Substance and Sexual Activity    Alcohol use: Yes     Comment: social--very light now    Drug use: No    Sexual activity: Yes     Partners: Female   Other Topics Concern    Not on file   Social History Narrative    Not on file     Social Determinants of Health     Financial Resource Strain:     Difficulty of Paying Living Expenses:    Food Insecurity:     Worried About Running Out of Food in the Last Year:     Ran Out of Food in the Last Year:    Transportation Needs:     Lack of Transportation (Medical):      Lack of Transportation (Non-Medical):    Physical Activity:     Days of Exercise per Week:     Minutes of Exercise per Session:    Stress:     Feeling of Stress :    Social Connections:     Frequency of Communication with Friends and Family:     Frequency of Social Gatherings with Friends and Family:     Attends Hoahaoism Services:     Active Member of Clubs or Organizations:     Attends Club or Organization Meetings:     Marital Status: Intimate Partner Violence:     Fear of Current or Ex-Partner:     Emotionally Abused:     Physically Abused:     Sexually Abused:       Family History   Problem Relation Age of Onset    Heart Attack Father        Review of Systems    A full review of systems was completed times ten organ systems and was deemed negative unless otherwise mentioned in the HPI. Physical Exam:    Visit Vitals  BP (!) 147/82   Pulse 78   Temp 97.4 °F (36.3 °C)   Resp 18   Ht 5' 6\" (1.676 m)   Wt 72.9 kg (160 lb 12.8 oz)   SpO2 97%   BMI 25.95 kg/m²      Awakens easily  speech intact  Well healed cicatrix right neck  Chest clear  Card regular  abd soft  Weakness right hand    CTA with clear critical stenosis last two studies    Impression and Plan:    Symptomatic left carotid stenosis  Clear indication for intervention  Discussed stent vs CEA  Also discussed who to do intervention as was scheduled already at the end of the month. Patient wants to proceed with left CEA, requests me as I did his last surgery and is happy with outcomes. Still has some lingering symptoms, will discuss with neuro and follow with a surgical date. Antonio Glass MD    PLEASE NOTE:  This document has been produced using voice recognition software. Unrecognized errors in transcription may be present.

## 2021-11-02 NOTE — PROGRESS NOTES
Patient was hospitalized 10/30/21with stroke symptoms. Dr Yuli Davey saw patient and had done surgery on him in the past.  Dr Mendez Sterling  will now be doing patients surgery.

## 2021-11-02 NOTE — PROGRESS NOTES
Problem: Self Care Deficits Care Plan (Adult)  Goal: *Acute Goals and Plan of Care (Insert Text)  Outcome: Resolved/Met     Problem: Patient Education: Go to Patient Education Activity  Goal: Patient/Family Education  Outcome: Resolved/Met   OCCUPATIONAL THERAPY EVALUATION/DISCHARGE    Patient: Elio Masterson (61 y.o. male)  Date: 11/2/2021  Primary Diagnosis: Acute right-sided weakness [R53.1]  Precautions:  Fall  PLOF: Patient reported he lives alone and was independent in ADLs and IADLs PTA. ASSESSMENT AND RECOMMENDATIONS:  Based on the objective data described below, the patient presents with functional AROM and strength of BUEs, good sitting and standing balance, and good functional endurance for completion of self care tasks. Patient sat EOB Mod I. Patient demonstrated some weakness in R hand . Patient simulated grooming tasks and opened mouthwash container, new chap stick, and toothpaste Mod I seated EOB. Patient simulated bathroom mobility and toilet transfer Mod I. Patient can complete ADLs Mod I at this time and does not require OT services at the acute care level. Skilled occupational therapy is not indicated at this time. Discharge Recommendations: Outpatient  Further Equipment Recommendations for Discharge: N/A      SUBJECTIVE:   Patient stated I can open it.  (opened chapstick with additional time)    OBJECTIVE DATA SUMMARY:     Past Medical History:   Diagnosis Date    Asthma     Autoimmune disease ([facility] [address]     psoriasis, ulcerative colitis    CAD in native artery     inf STEMI (april 2014) managed medically due to concomittant stroke; cath performed few weeks later: mild LM, LAD, RCA; 55% AVLCx (dominant). Colitis     Colon cancer ([facility] [address]     Dyslipidemia     Heart attack ([facility] [address]     Hx of transesophageal echocardiography ([facility]) 04/28/2014    EF 50%. Basal-mid inferior hypk. No LA appendage thrombus. No R-L atrial septal shunt by IV contrast.  Mild ALYCE.   No cardioembolic source for stroke. Panic attack     S/P cardiac catheterization 05/06/2014    LM patent. LAD (sm) mild. AV-CX 55% w/linear filling defect prox to apex suggesting intimal tear. RCA very sm, nondom. S/P carotid endarterectomy     R CEA (July 2014)    Stroke [facility]) 4/23/14    Parasthesis right saide, blindness left eye     Past Surgical History:   Procedure Laterality Date    HX APPENDECTOMY      HX HEART CATHETERIZATION      HX HERNIA REPAIR      HX ORTHOPAEDIC Left     Left wrist sx    NEUROLOGICAL PROCEDURE UNLISTED  6/2014    Cerbral Angiogram    VASCULAR SURGERY PROCEDURE UNLIST      R carotid endartectomy with angioplasty      Barriers to Learning/Limitations: None  Compensate with: N/A    Home Situation:   Home Situation  Home Environment: Private residence  # Steps to Enter: 0  One/Two Story Residence: One story  Living Alone: Yes  Support Systems: Other (Comment) (cousins)  Patient Expects to be Discharged to[de-identified] House  Current DME Used/Available at Home: None  Tub or Shower Type: Tub  [x]     Right hand dominant   []     Left hand dominant    Cognitive/Behavioral Status:  Neurologic State: Alert  Orientation Level: Oriented X4  Cognition: Follows commands    Skin: Intact in BUEs  Edema: No edema noted in BUEs    Vision/Perceptual:    Per patient this is baseline due to previous stroke:   Tracking: Able to track stimulus in all quadrants w/o difficulty    Saccades: Additional eye shifts occurred during testing    Visual Fields: Difficulty detecting stimulus  in left lateral quadrant; Difficulty detecting stimulus in left lower quadrant; Difficulty detecting stimulus in left upper quadrant    Coordination: BUE  Coordination: Within functional limits  Fine Motor Skills-Upper: Left Intact (R decreased but functional)      Balance:  Sitting: Intact  Standing: Intact    Strength: BUE  Strength: Within functional limits  Decreased but functional R had strength.     Tone & Sensation: BUE  Tone: Normal  Sensation: Impaired (tingling reported in R forearm)    Range of Motion: BUE  AROM: Within functional limits (R hand decreased strength but functional)    Functional Mobility and Transfers for ADLs:  Bed Mobility:  Supine to Sit: Modified independent  Sit to Supine: Modified independent     Transfers:  Sit to Stand: Modified independent  Stand to Sit: Modified independent   Toilet Transfer : Modified independent (simulated)   Bathroom Mobility: Modified independent (simulated)    ADL Assessment:  Feeding: Modified independent    Oral Facial Hygiene/Grooming: Modified Independent    Bathing: Modified independent    Upper Body Dressing: Modified independent    Lower Body Dressing: Modified independent    Toileting: Modified independent    ADL Intervention:  Educated patient on role of OT in acute care and recommendation for outpatient therapy    Pain:  Pain level pre-treatment: 0/10   Pain level post-treatment: 0/10   Pain Intervention(s): N/A    Activity Tolerance:   Good  Please refer to the flowsheet for vital signs taken during this treatment. After treatment:   []  Patient left in no apparent distress sitting up in chair  [x]  Patient left in no apparent distress in bed  [x]  Call bell left within reach  []  Nursing notified  []  Caregiver present  []  Bed alarm activated    COMMUNICATION/EDUCATION:   [x]      Role of Occupational Therapy in the acute care setting  [x]      Home safety education was provided and the patient/caregiver indicated understanding. [x]      Patient/family have participated as able and agree with findings and recommendations. []      Patient is unable to participate in plan of care at this time. Thank you for this referral.  [facility], OTR/L  Time Calculation: 15 mins      Eval Complexity: History: LOW Complexity : Brief history review ;    Examination: LOW Complexity : 1-3 performance deficits relating to physical, cognitive , or psychosocial skils that result in activity limitations and / or participation restrictions ;    Decision Making:LOW Complexity : No comorbidities that affect functional and no verbal or physical assistance needed to complete eval tasks

## 2021-11-03 ENCOUNTER — ANESTHESIA EVENT (OUTPATIENT)
Dept: CARDIOTHORACIC SURGERY | Age: 61
DRG: 038 | End: 2021-11-03
Payer: COMMERCIAL

## 2021-11-03 LAB
ANION GAP SERPL CALC-SCNC: 4 MMOL/L (ref 3–18)
BASOPHILS # BLD: 0 K/UL (ref 0–0.1)
BASOPHILS NFR BLD: 1 % (ref 0–2)
BUN SERPL-MCNC: 12 MG/DL (ref 7–18)
BUN/CREAT SERPL: 9 (ref 12–20)
CALCIUM SERPL-MCNC: 9 MG/DL (ref 8.5–10.1)
CHLORIDE SERPL-SCNC: 112 MMOL/L (ref 100–111)
CO2 SERPL-SCNC: 24 MMOL/L (ref 21–32)
CREAT SERPL-MCNC: 1.35 MG/DL (ref 0.6–1.3)
DIFFERENTIAL METHOD BLD: ABNORMAL
EOSINOPHIL # BLD: 0.4 K/UL (ref 0–0.4)
EOSINOPHIL NFR BLD: 5 % (ref 0–5)
ERYTHROCYTE [DISTWIDTH] IN BLOOD BY AUTOMATED COUNT: 13.3 % (ref 11.6–14.5)
GLUCOSE SERPL-MCNC: 93 MG/DL (ref 74–99)
HCT VFR BLD AUTO: 42.7 % (ref 36–48)
HGB BLD-MCNC: 13.3 G/DL (ref 13–16)
LYMPHOCYTES # BLD: 1.5 K/UL (ref 0.9–3.6)
LYMPHOCYTES NFR BLD: 19 % (ref 21–52)
MCH RBC QN AUTO: 30.4 PG (ref 24–34)
MCHC RBC AUTO-ENTMCNC: 31.1 G/DL (ref 31–37)
MCV RBC AUTO: 97.7 FL (ref 78–100)
MONOCYTES # BLD: 0.9 K/UL (ref 0.05–1.2)
MONOCYTES NFR BLD: 12 % (ref 3–10)
NEUTS SEG # BLD: 5 K/UL (ref 1.8–8)
NEUTS SEG NFR BLD: 64 % (ref 40–73)
PLATELET # BLD AUTO: 224 K/UL (ref 135–420)
PMV BLD AUTO: 10.5 FL (ref 9.2–11.8)
POTASSIUM SERPL-SCNC: 4.2 MMOL/L (ref 3.5–5.5)
RBC # BLD AUTO: 4.37 M/UL (ref 4.35–5.65)
SODIUM SERPL-SCNC: 140 MMOL/L (ref 136–145)
WBC # BLD AUTO: 7.9 K/UL (ref 4.6–13.2)

## 2021-11-03 PROCEDURE — 74011250637 HC RX REV CODE- 250/637: Performed by: SURGERY

## 2021-11-03 PROCEDURE — 65660000000 HC RM CCU STEPDOWN

## 2021-11-03 PROCEDURE — 99232 SBSQ HOSP IP/OBS MODERATE 35: CPT | Performed by: HOSPITALIST

## 2021-11-03 PROCEDURE — 80048 BASIC METABOLIC PNL TOTAL CA: CPT

## 2021-11-03 PROCEDURE — 36415 COLL VENOUS BLD VENIPUNCTURE: CPT

## 2021-11-03 PROCEDURE — 2709999900 HC NON-CHARGEABLE SUPPLY

## 2021-11-03 PROCEDURE — 85025 COMPLETE CBC W/AUTO DIFF WBC: CPT

## 2021-11-03 PROCEDURE — 74011250637 HC RX REV CODE- 250/637: Performed by: INTERNAL MEDICINE

## 2021-11-03 RX ADMIN — CLOPIDOGREL BISULFATE 75 MG: 75 TABLET ORAL at 08:15

## 2021-11-03 RX ADMIN — BUPROPION HYDROCHLORIDE 150 MG: 150 TABLET, FILM COATED, EXTENDED RELEASE ORAL at 06:19

## 2021-11-03 RX ADMIN — TOPIRAMATE 100 MG: 100 TABLET, FILM COATED ORAL at 08:15

## 2021-11-03 RX ADMIN — METOPROLOL SUCCINATE 25 MG: 25 TABLET, EXTENDED RELEASE ORAL at 08:15

## 2021-11-03 RX ADMIN — NORTRIPTYLINE HYDROCHLORIDE 75 MG: 25 CAPSULE ORAL at 21:58

## 2021-11-03 RX ADMIN — PANTOPRAZOLE SODIUM 40 MG: 40 TABLET, DELAYED RELEASE ORAL at 08:15

## 2021-11-03 RX ADMIN — TOPIRAMATE 100 MG: 100 TABLET, FILM COATED ORAL at 17:10

## 2021-11-03 RX ADMIN — PRAVASTATIN SODIUM 80 MG: 20 TABLET ORAL at 21:58

## 2021-11-03 NOTE — PROGRESS NOTES
Reviewed chart and the neurology input, appreciated  Patient up walking around the room  Discussed with patient for left carotid enterectomy, he is agreeable to proceed  We will plan for tomorrow morning

## 2021-11-03 NOTE — ADT AUTH CERT NOTES
Neurology [address] Day 4 (11/2/2021) by Clive Ruiz RN       Review Status Review Entered   Completed 11/2/2021 15:08      Criteria Review      Care Day: 4 Care Date: 11/2/2021 Level of Care: Telemetry    Guideline Day 2    Level Of Care    (X) Floor    11/2/2021 15:08:58 EDT by Isidoro Martinez      tele    Clinical Status    (X) * No ICU or intermediate care needs    11/2/2021 15:08:58 EDT by Marlee Willis no icu needs    Interventions    (X) Inpatient interventions continue    11/2/2021 15:08:58 EDT by Isidoro Martinez      cardiac monitoring, regular diet, scd, incentive spirometry    * Milestone   Additional Notes   11/2/21      NEUROLOGY:   Assessment:       This is a 65 y/o WM with a history of CAD and prior right ICA occlusion and right hemispheric strokes who presented with transient right hand symptoms and some dizziness. This is/was likely due to a TIA or could be reactivation from a prior left hemispheric stroke or also he could have simply been relatively hypotensive.  Certainly he has significant JESSICA disease and requires intervention. Agree with re-vascularization and there is no clear reason to delay this given the lack of any acute stroke.            Active Problems:     Acute right-sided weakness (10/30/2021)               Plan:       1. /Plavix 75mg   2. Will send off UA to exclude some other process reactivating his prior stroke   3. Will obtain orthostatics.              VASCULAR SURGERY:   Impression and Plan:       Symptomatic left carotid stenosis   Clear indication for intervention   Discussed stent vs CEA   Also discussed who to do intervention as was scheduled already at the end of the month. Patient wants to proceed with left CEA, requests me as I did his last surgery and is happy with outcomes.    Still has some lingering symptoms, will discuss with neuro and follow with a surgical date.   Darryle Opoka, MD         /69   Pulse 78   Temp 97.4 °F (36.3 °C)   Resp 18   Ht 5' 6\" (1.676 m)   Wt 72.6 kg (160 lb)   SpO2 97%   BMI 25.82 kg/m²   RA         Current Facility-Administered Medications:    ·  [START ON 11/3/2021] aspirin chewable tablet 324 mg, 324 mg, Oral, DAILY, Van Rooney MD   ·  acetaminophen (TYLENOL) tablet 650 mg, 650 mg, Oral, Q6H PRN, Diego Khoury MD   ·  prochlorperazine (COMPAZINE) injection 10 mg, 10 mg, IntraVENous, Q6H PRN, Haroon Anaya MD   ·  magnesium hydroxide (MILK OF MAGNESIA) 400 mg/5 mL oral suspension 30 mL, 30 mL, Oral, DAILY PRN, Pranav Downing MD   ·  albuterol-ipratropium (DUO-NEB) 2.5 MG-0.5 MG/3 ML, 3 mL, Nebulization, Q4H PRN, Pranav Downing MD   ·  buPROPion XL (WELLBUTRIN XL) tablet 150 mg, 150 mg, Oral, 7am, Pranav Downing MD, 150 mg at 11/02/21 0648   ·  metoprolol succinate (TOPROL-XL) XL tablet 25 mg, 25 mg, Oral, DAILY, Pranav Downing MD, 25 mg at 11/02/21 0838   ·  nitroglycerin (NITROSTAT) tablet 0.4 mg, 0.4 mg, SubLINGual, Q5MIN PRN, Diego Khoury MD   ·  nortriptyline (PAMELOR) capsule 75 mg, 75 mg, Oral, QHS, Pranav Downing MD, 75 mg at 11/01/21 2219   ·  pravastatin (PRAVACHOL) tablet 80 mg, 80 mg, Oral, QHS, Pranav Downing MD, 80 mg at 11/01/21 2219   ·  pantoprazole (PROTONIX) tablet 40 mg, 40 mg, Oral, DAILY, Pranav Downing MD, 40 mg at 11/02/21 0838   ·  topiramate (TOPAMAX) tablet 100 mg, 100 mg, Oral, BID, Diego Khoury MD, 100 mg at 11/02/21 7058   ·  [Held by provider] enoxaparin (LOVENOX) injection 40 mg, 40 mg, SubCUTAneous, Q24H, Pranav Downing MD   ·  clopidogreL (PLAVIX) tablet 75 mg, 75 mg, Oral, DAILY, Carmen Mchugh MD, 75 mg at 11/02/21 0832         Results for Alan Geronimo (MRN [id number]) as of 11/2/2021 15:10      11/2/2021 05:07   MONOCYTES: 12 (H)      Results for Alan Geronimo (MRN [id number]) as of 11/2/2021 15:10      11/2/2021 05:07   BUN/Creatinine ratio: 9 (L)   Calcium: 8.1 (L)         ECHO:   · IAS: Agitated saline contrast study was performed. There was no shunting at baseline or with Valsalva. · LV: Estimated LVEF is 55 - 60%. Visually measured ejection fraction. Normal systolic function (ejection fraction normal). Small left ventricle. Mild concentric hypertrophy. Age-appropriate left ventricular diastolic function. · RV: Not well visualized.               H&P by Chante Alfaro RN       Review Status Review Entered   In Primary 11/2/2021 07:27      Criteria Review   11/1/21     H&P     History and Physical        Chief complaint: Headaches followed by right hand heaviness and dizziness while he was at work 2 days ago     Source of prevention: Patient and medical record     HPI:      Elida Mack is a 64 y.o.  male who presented to [facility] emergency room on October 30, 2021 from work after patient noticed having right hand heaviness with dizziness. Tavo Hwang stated he has history of migraine and started having headaches while he was at work and took [address] drove himself from work to emergency room. Tavo Hwang has off-and-on headaches without any visual disturbances.  No nausea or vomiting.  No dysphagia. Tavo Hwang also had some visual disturbances on the day when he had headaches followed by dizziness.     He feels fine currently.  No visual disturbances.  No soreness of throat.  No dysphagia.  Denies any chest pain or shortness of breath or cough.  No tingling or numbness.  No nausea vomiting or abdominal pain.  No bowel or bladder disturbances.  No right-sided weakness currently. Tavo Hwang has been taking Topamax 100 mg twice daily, nortriptyline and 150 mg Wellbutrin for migraine headaches at home. Tavo Hwang has been taking aspirin, Plavix, metoprolol, Pravachol for his coronary artery disease and previous stroke.  Drinks alcohol socially.  He quit smoking 7 years 7 years ago.     In the emergency room, CT head and MRI brain were negative.   CTA head and neck showed 80% stenosis of left internal carotid artery.  ED provider spoke to telemetry neurologist who recommended patient to be started on heparin drip.             Past Medical History:   Diagnosis Date    Asthma      Autoimmune disease ([facility]       psoriasis, ulcerative colitis    CAD in native artery       inf STEMI (2014) managed medically due to concomittant stroke; cath performed few weeks later: mild LM, LAD, RCA; 55% AVLCx (dominant).  Colitis      Colon cancer ([facility]      Dyslipidemia      Heart attack ([facility]      Hx of transesophageal echocardiography ([facility]) 2014     EF 50%.  Basal-mid inferior hypk.  No LA appendage thrombus.  No R-L atrial septal shunt by IV contrast.  Mild ALYCE.  No cardioembolic source for stroke.  Panic attack      S/P cardiac catheterization 2014     LM patent.  LAD (sm) mild.  AV-CX 55% w/linear filling defect prox to apex suggesting intimal tear.  RCA very sm, nondom.      S/P carotid endarterectomy       R CEA (2014)    [facility]roke [facility] 14     Parasthesis right saide, blindness left eye                Past Surgical History:   Procedure Laterality Date    HX APPENDECTOMY        HX HEART CATHETERIZATION        HX HERNIA REPAIR        HX ORTHOPAEDIC Left       Left wrist sx    NEUROLOGICAL PROCEDURE UNLISTED   2014     Cerbral Angiogram    VASCULAR SURGERY PROCEDURE UNLIST         R carotid endartectomy with angioplasty                 Family History   Problem Relation Age of Onset    Heart Attack Father        Social History                Tobacco Use    Smoking status: Former Smoker       Packs/day: 1.00       Years: 41.00       Pack years: 41.00       Types: Cigarettes       Quit date: 2014       Years since quittin.5    Smokeless tobacco: Never Used   Substance Use Topics    Alcohol use: Yes       Comment: social--very light now                     Prior to Admission medications    Medication Sig Start Date End Date Taking? Authorizing Provider   nortriptyline (PAMELOR) 50 mg capsule Take 3 Capsules by mouth.       Provider, Historical   clopidogreL (PLAVIX) 75 mg tab Take 1 tablet by mouth once daily 6/2/21     Henrique Grewal MD   metoprolol succinate (TOPROL-XL) 25 mg XL tablet Take 1 Tab by mouth daily. 11/5/20     Henrique Grewal MD   buPROPion XL (WELLBUTRIN XL) 300 mg XL tablet Take 300 mg by mouth every morning.       Provider, Historical   citalopram (CELEXA) 10 mg tablet TAKE 1 TABLET BY MOUTH ONCE DAILY 7/10/19     Provider, Historical   topiramate (TOPAMAX) 100 mg tablet Take 150 mg by mouth two (2) times a day.       Provider, Historical   pravastatin (PRAVACHOL) 80 mg tablet Take 80 mg by mouth nightly.       Provider, Historical   pantoprazole (PROTONIX) 40 mg tablet Take 40 mg by mouth daily.       Provider, Historical   aspirin 81 mg chewable tablet Take 162 mg by mouth daily.       Provider, Historical   nitroglycerin (NITROSTAT) 0.4 mg SL tablet 1 Tab by SubLINGual route every five (5) minutes as needed for Chest Pain (up to 3 doses only). 4/29/14     Mell Castro MD                Allergies   Allergen Reactions    Cat Dander Itching    Chlorhexidine Gluconate Itching    Dog Dander Itching    Lipitor [Atorvastatin] Other (comments)       dizziness    Seafood Swelling    Zocor [Simvastatin] Vertigo       Same as with lipitor, once on it for about two weeks experienced vertigo, stopped when he discontinued it         Review of Systems:     Review of Systems-  GENERAL: No fever, chills, malaise, weight changes  HEENT: No change in vision, no earache, sore throat or sinus congestion. NECK: No pain or stiffness. PULMONARY: No shortness of breath, cough or wheeze. Cardiovascular: no pnd or orthopnea, no CP  GASTROINTESTINAL: No abdominal pain, nausea, vomiting or diarrhea, melena or bright red blood per rectum. GENITOURINARY: No urinary frequency or dysuria. MUSCULOSKELETAL: No joint or muscle pain, no back pain. DERMATOLOGIC: No rash, no itching.   ENDOCRINE: No polyuria, polydipsia, no heat or . NEUROLOGIC: Headaches present.  No seizures, numbness, tingling or weakness.         Objective:      Intake and Output:    11/01 0701 - 11/01 1900  In: -   Out: 600 [Urine:600]  10/30 1901 - 11/01 0700  In: -   Out: 1350 [Urine:1350]     Physical Exam:      BP (!) 155/85 (BP 1 Location: Left upper arm, BP Patient Position: At rest)   Pulse 62   Temp 98.8 °F (37.1 °C)   Resp 18   Ht 5' 6\" (1.676 m)   Wt 72.9 kg (160 lb 12.8 oz)   SpO2 98%   BMI 25.95 kg/m²      General appearance -awake, follows commands appropriately, not in acute distress. Eyes - sclera anicteric, no pallor  Nose - no obvious nasal discharge. Neck - supple, no JVD, trachea is midline  Chest -clear air entry noted in bases, no wheezes  Heart - S1 and S2 normal  Abdomen - soft, nontender, nondistended, Bowel sounds present  Neurological - alert, oriented, normal speech, no focal findings noted, good handgrips, no tremors noted.   Musculoskeletal - no joint tenderness or erythema of knees bilaterally  Extremities - no pedal edema noted        Data Review:   Recent Results             Recent Results (from the past 24 hour(s))   PTT     Collection Time: 11/01/21  3:50 AM   Result Value Ref Range     aPTT 68.4 (H) 23.0 - 36.4 SEC   PTT     Collection Time: 11/01/21 11:15 AM   Result Value Ref Range     aPTT 72.3 (H) 23.0 - 36.4 SEC                    CT Results  (Last 48 hours)     None                Assessment:      1.  Right hand heaviness with visual disturbances due to #2  2.  Complex migraine with TIA  3.  Left internal carotid artery disease  4.  History of right CEA  5.  History of stroke with multiple chronic bilateral infarcts  6.  Hypertension  7.  Coronary artery disease  8.  Dyslipidemia     Plan:      Patient will be admitted to telemetry floor.  Spoke to neurologist and vascular surgeon. Meagan Sosa discontinue heparin drip.  Continue aspirin and statin.  Continue beta-blocker for hypertension and coronary artery disease.  MRI brain is negative.   CTA head report reviewed. Carmen Devika has been ordered.  PT, OT and speech therapist to follow this patient. Evelina Rosas will continue his home medications for migraine including Topamax, Wellbutrin, nortriptyline with as needed Tylenol.  Patient wishes to be full code.           Neurology [address] Day 1 (10/30/2021) by Priya Ko RN       Review Status Review Entered   Completed 11/1/2021 10:34      Criteria Review      Care Day: 1 Care Date: 10/30/2021 Level of Care: Telemetry    Guideline Day 1    Level Of Care    ( ) ICU or intermediate care    11/1/2021 10:34:59 EDT by Adwoa Gandhi    Clinical Status    (X) * Clinical Indications met    11/1/2021 10:34:59 EDT by Chase Fregoso      Acute right-sided weakness    Interventions    (X) Inpatient interventions as needed    11/1/2021 10:34:59 EDT by Chase Fregoso      Cardiac monitoring, nerochecks,initiate dysphagia screen, tele neuro consult, ADULT DIET Regular; Low Fat/Low Chol/High Fiber/RICARDO    * Milestone   Additional Notes   10/30/21      ED HPI:   Colbert Schirmer is a 64 y.o. male with a past medical history significant for asthma, ulcerative colitis, CAD, CVA, colon cancer, dyslipidemia, comes into the ED today due to headache and right-sided weakness.  Patient states upon waking up this morning around 9 AM he began to have a headache.  Patient states that approximately 45 minutes prior to arrival in the emergency department he began to develop right-sided weakness as well as tremors.  Patient states symptoms are not consistent with previous CVA in the past. Ulises León continues to endorse right upper and lower extremity weakness from baseline but denies any dysarthria or other further symptoms.  He otherwise states he has been in good health without any fever or chills.  Patient states symptoms have worsened since onset. [facility] is not taking any medication for treatment of his symptoms.  He is taking Plavix, aspirin, and metoprolol for treatment of his chronic medical conditions.  He describes the severity of his symptoms as severe with an acute onset.          ED ROS:   Neurological: Positive for tremors, weakness (RUE and RLE) and headaches         VS:   BP: (!) 199/88   Pulse: 75   Resp: 20   Temp: 98.8 °F (37.1 °C)   SpO2: 98%   Weight: 72.1 kg (159 lb)   Height: 5' 6\" (1.676 m)               ED PHYSICAL EXAM:      General: No focal deficit present.       Mental Status: He is alert and oriented to person, place, and time. Mental status is at baseline.       Cranial Nerves: No cranial nerve deficit.       Sensory: Sensory deficit (Subjective sensation to touch decreased to the right upper and lower extremity when compared to the left) present.       Motor: Weakness (4+/5 weakness to the right upper and lower extremity when compared to the left.  Mild drift seen to the right upper extremity on exam) present.       Gait: Gait normal.       Comments: No dysarthria appreciated          ED MEDS:   magnesium sulfate 2 g/50 ml IVPB (premix or compounded)    Dose: 2 g   Freq: ONCE Route: IV      heparin (porcine) 1,000 unit/mL injection 3,000 Units    Dose: 3,000 Units   Freq: NOW Route: IV      acetaminophen (TYLENOL) tablet 1,000 mg    Dose: 1,000 mg   Freq: ONCE Route: POX2      0.9% sodium chloride infusion    Dose: 50 mL/hr   Freq: ONCE Route: IV            ED NOTES:   Brian Yañez presents with complaint of headache with right upper and lower extremity weakness   DDX includes but is not limited to: CVA, TIA, hypoglycemia       Will obtain lab work and imaging per stroke protocol.  Code stroke called upon presentation to the emergency department. Hieu Maloney provide patient with IV labetalol for further treatment of his hypertension. Will continue to monitor and evaluate patient while in the ED.          Sat Oct 30, 2021   1159 With radiologist who states CT scan similar to prior without any evidence of acute hemorrhage. Agustín Lay will continue to monitor patient.     [DV]   5 Spoke with neurology who recommends patient have autoregulation of his blood pressure up to 820 systolic.  Also recommended starting heparin with PTT goal of 50-70.  Stated patient should not continue his Plavix however aspirin 81 mg daily still appropriate.  Recommended full CVA work-up and likely evaluation by vascular surgery for endarterectomy if he has significant carotid artery stenosis which was visualized on previous CTA of the head.  We will likely transfer patient here shortly for further treatment and evaluation.     [DV]            EKG:   Normal sinus rhythm    Left axis deviation    Minimal voltage criteria for LVH, may be normal variant    Possible Inferior infarct (cited on or before 07-OCT-2019)    Possible Anterior infarct , age undetermined    Abnormal ECG    When compared with ECG of 07-OCT-2019 09:41,    QRS axis shifted left    T wave inversion now evident in Inferior leads         CT HEAD:   No acute intracranial hemorrhage. No acute findings. Stable to prior. Multiple chronic bilateral supratentorial infarcts. CTA HEAD & NECK:   IMPRESSION   1.  No large vessel occlusion identified. 2.  Approximate 80% stenosis of the left ICA approximately 1 cm from origin. 3.  Patent, nonstenotic changes related to prior right CEA.             MRI BRAIN:   1.  No acute infarct, hemorrhage or mass lesion identified. 2.  Multiple chronic bilateral infarcts with encephalomalacic changes. 10/30/2021 11:45   EOSINOPHILS: 6 (H)   ABS. EOSINOPHILS: 0.6 (H)            10/30/2021 11:45   Glucose: 103 (H)   BUN/Creatinine ratio: 5 (L)   GFR est non-AA: 58 (L)            10/30/2021 19:28   aPTT: 51.4 (H)      TELENEUROLOGY:   The patient is a 64year old male with HTN, migraines (on Topamax), 2 prior strokes (on    DAPT), prior right CEA, and left ICAS (> 90%) who presents with headache, double vision,    and right sided paresthesia.  His NIHSS is 2, and he does not warrant tPA. However, if he    has a stroke attributable to the left carotid lesion, he should receive urgent CEA. Please    obtain C TA head and neck followed by a brain MRI. Start a heparin drip without bolus with    goal PTT 50 to 70 in addition to aspirin. Pending those results, he may need vascular    surgery consultation. Please autoregulate BP to 200. Patient is at high risk for neurological deterioration. The critical elements of care are    specifically described in the Medical Decision-Making section of the note. Plan/Recommendations    Give ASA 81 mg now    MRI brain    Q 1 hour neuro checks    Hydrate with Normal Saline @ IOOcc/hr. Keep BG  mg/dl    Avoid fever (T >38.0 oc)    Keep on telemetry to assess for arrhythmias. · Check lipid profile and HgAIC    ·    Bedside swallow evaluation - formal Speech therapy consult if does not meet bedside    criteria    OT and PT evaluation    Perform Stroke Education    Provide Smoking Cessation education if relevant    Avoid Placement of Quiroz Catheter    D/W Local Provider    The patient should receive a CT angiography of the head and neck followed by a brain MRI    to establish definitive evidence of stroke. Please also obtain a surface echocardiogram. The    blood pressure should be autoregulated for 24 hours after which time it can be gradually    reduced to target normotension. The long-term blood pressure goal should be less than    130/80 mmHg. Please also check a lipid panel. If the LDL if greater than 70, please ensure    that the patient is on a high-intensity statin and that it is optimized (so long as the patient has    no additional contraindications such as myopathy or transaminitis). Ensure that HbA1c is at    goal of less than 7 as well. The patient should be admitted for further monitoring on telemetry with frequent    neurochecks.  DVT prophylaxis with pneumatic compression devices should be initiated               Current Facility-Administered Medications:    ·  heparin 25,000 units in D5W 250 ml infusion, 12-25 Units/kg/hr, IntraVENous, TITRATE, Burt Erickson DO, Last Rate: 10.1 mL/hr at 11/01/21 0431, 14 Units/kg/hr at 11/01/21 0431   ·  aspirin chewable tablet 81 mg, 81 mg, Oral, DAILY, Audrey Jacques DO, 81 mg at 11/01/21 3053      Current Outpatient Medications:    ·  nortriptyline (PAMELOR) 50 mg capsule, Take 3 Capsules by mouth., Disp: , Rfl:    ·  clopidogreL (PLAVIX) 75 mg tab, Take 1 tablet by mouth once daily, Disp: 90 Tablet, Rfl: 3   ·  metoprolol succinate (TOPROL-XL) 25 mg XL tablet, Take 1 Tab by mouth daily. , Disp: 30 Tab, Rfl: 11   ·  buPROPion XL (WELLBUTRIN XL) 300 mg XL tablet, Take 300 mg by mouth every morning., Disp: , Rfl:    ·  citalopram (CELEXA) 10 mg tablet, TAKE 1 TABLET BY MOUTH ONCE DAILY, Disp: , Rfl: 3   ·  topiramate (TOPAMAX) 100 mg tablet, Take 150 mg by mouth two (2) times a day., Disp: , Rfl:    ·  pravastatin (PRAVACHOL) 80 mg tablet, Take 80 mg by mouth nightly., Disp: , Rfl:    ·  pantoprazole (PROTONIX) 40 mg tablet, Take 40 mg by mouth daily. , Disp: , Rfl:    ·  aspirin 81 mg chewable tablet, Take 162 mg by mouth daily. , Disp: , Rfl:    ·  nitroglycerin (NITROSTAT) 0.4 mg SL tablet, 1 Tab by SubLINGual route every five (5) minutes as needed for Chest Pain (up to 3 doses only). , Disp: 30 Tab, Rfl: 0

## 2021-11-03 NOTE — PROGRESS NOTES
[facility]  Progress Note    Patient: Marah Hyde Age: 63 y.o. : 1960 MR#: [id number] SSN: [id number]  Date/Time: 11/3/2021     Subjective:     Patient seen and evaluated, lying in bed, no acute distress. Patient admitted to the hospital for planned left CEA. Vascular surgery on board and plan for left CEA in a.m. Assessment/Plan:     1. Left ICA stenosis-vascular surgery on board, plan for left CEA in a.m., n.p.o. after midnight. 2. History of CAD  3. History of mood disorder-continue Wellbutrin  4. History of headache-continue Topamax, nortriptyline  DVT prophylaxis-SCDs  Full code                Lizzie Blankenship MD  21      Case discussed with:  [x]Patient  []Family  []Nursing  []Case Management  DVT Prophylaxis:  []Lovenox  []Hep SQ  [x]SCDs  []Coumadin   []On Heparin gtt    Objective:   VS:   Visit Vitals  /66   Pulse 62   Temp 98.4 °F (36.9 °C)   Resp 18   Ht 5' 6\" (1.676 m)   Wt 72.6 kg (160 lb)   SpO2 96%   BMI 25.82 kg/m²      Tmax/24hrs: Temp (24hrs), Av.9 °F (36.6 °C), Min:97.6 °F (36.4 °C), Max:98.4 °F (36.9 °C)  IOBRIEF    Intake/Output Summary (Last 24 hours) at 11/3/2021 1717  Last data filed at 2021 2225  Gross per 24 hour   Intake 240 ml   Output --   Net 240 ml       General:  Alert, cooperative, no acute distress    HEENT: PERRLA, anicteric sclerae. Pulmonary:  CTA Bilaterally. No Wheezing/Rhonchi/Rales. Cardiovascular: Regular rate and Rhythm. GI:  Soft, Non distended, Non tender. + Bowel sounds. Extremities:  No edema, cyanosis, clubbing. No calf tenderness. Neurologic: Alert and oriented X 3. No acute neuro deficits.   Additional:    Medications:   Current Facility-Administered Medications   Medication Dose Route Frequency    aspirin chewable tablet 324 mg  324 mg Oral DAILY    acetaminophen (TYLENOL) tablet 650 mg  650 mg Oral Q6H PRN    prochlorperazine (COMPAZINE) injection 10 mg  10 mg IntraVENous Q6H PRN    magnesium hydroxide (MILK OF MAGNESIA) 400 mg/5 mL oral suspension 30 mL  30 mL Oral DAILY PRN    albuterol-ipratropium (DUO-NEB) 2.5 MG-0.5 MG/3 ML  3 mL Nebulization Q4H PRN    buPROPion XL (WELLBUTRIN XL) tablet 150 mg  150 mg Oral 7am    metoprolol succinate (TOPROL-XL) XL tablet 25 mg  25 mg Oral DAILY    nitroglycerin (NITROSTAT) tablet 0.4 mg  0.4 mg SubLINGual Q5MIN PRN    nortriptyline (PAMELOR) capsule 75 mg  75 mg Oral QHS    pravastatin (PRAVACHOL) tablet 80 mg  80 mg Oral QHS    pantoprazole (PROTONIX) tablet 40 mg  40 mg Oral DAILY    topiramate (TOPAMAX) tablet 100 mg  100 mg Oral BID    [Held by provider] enoxaparin (LOVENOX) injection 40 mg  40 mg SubCUTAneous Q24H    [Held by provider] clopidogreL (PLAVIX) tablet 75 mg  75 mg Oral DAILY       Imaging:   XR Results (most recent):  Results from Hospital Encounter encounter on 05/22/14    XR CHEST PORT    Narrative  Portable Frontal Chest.    CPT CODE: 27772, 91469    CLINICAL HISTORY: Dizziness. CVA and myocardial infarction 4 weeks ago. .    TECHNIQUE: Single frontal view of the chest, obtained portably. COMPARISONS: 5/5/14. FINDINGS:    The lungs are clear. The cardiomediastinal silhouette is unremarkable. Pulmonary vascularity is normal.  There are no effusions. Impression  :    NORMAL CHEST. CT Results (most recent):  Results from [place] encounter on 10/30/21    CTA HEAD NECK W CONT    Narrative  EXAM: CTA HEAD NECK W CONT    CLINICAL INDICATIONS: Right UE and LE weakness    TECHNIQUE: Helical CT scan of the brain and neck were performed at 1.25 mm  intervals during rapid IV bolus contrast administration. These data were  reconstructed at .625 mm intervals for vascular analysis. The data was also  reviewed at 2.5 mm intervals for accompanying soft tissue analysis.  3D post  processed images, including surface shaded displays, were produced for this exam  on independent console, permanently archived and interpreted. All CT scans at this facility are performed using dose optimization technique as  appropriate to a performed exam, to include automated exposure control,  adjustment of the MA and/or kV according to patient size (including appropriate  matching for site-specific examinations) or use of  iterative reconstruction  technique. CONTRAST: 80 mL intravenous Isovue 370    COMPARISON: 10/21/2021    CTA NECK VASCULAR ANALYSIS:    Aortic arch: Left sided with typical configuration. Atherosclerotic disease  results in mild narrowing of the left subclavian artery. Right carotid:  -CCA: Patent  -ECA: Origin is patent  -ICA: Patent with mild irregularity consistent with prior CEA. 0% stenosis of  proximal ICA by NASCET criteria. Left carotid:  -CCA: Patent  -ECA: Origin is patent  -ICA: Atherosclerotic soft plaque approximately 1 cm above the origin results in  approximately 80% stenosis of proximal ICA by NASCET criteria with minimal  diameter of 1.5 mm. Right vertebral: Patent  Left vertebral: Patent      CTA HEAD VASCULAR ANALYSIS:    Anterior circulation:  -ICA: Atherosclerotic disease of the bilateral cavernous internal carotid  arteries without significant stenosis. -MARLYN: Patent  -MCA: Patent  -AComm: Diminutive  -PComm: Patent bilaterally. Posterior circulation:  -Vertebral: Patent  -Basilar: Patent  -Superior cerebellar: Patent  -PCA: Bilateral hypoplastic P1 segments with vascular supply through the  posterior indicating artery (bilateral fetal PCA). Major dural venous sinuses: Unremarkable    CTA SOFT TISSUE ANALYSIS:  Lungs: Clear  Upper chest: Unremarkable  Neck: Postsurgical changes in the right neck, related to prior CEA  Lymph nodes: No significant lymphadenopathy  Orbits: Unremarkable  Paranasal sinuses: Small mucous retention cyst/polyp left maxillary sinus. Brain: Redemonstrated chronic encephalomalacia, better evaluated on concurrent  head CT and MRI.   Bones: Unremarkable for age. Impression  1. No large vessel occlusion identified. 2.  Approximate 80% stenosis of the left ICA approximately 1 cm from origin. 3.  Patent, nonstenotic changes related to prior right CEA. 10/30/21    ECHO ADULT COMPLETE 11/02/2021 11/2/2021    Interpretation Summary  · IAS: Agitated saline contrast study was performed. There was no shunting at baseline or with Valsalva. · LV: Estimated LVEF is 55 - 60%. Visually measured ejection fraction. Normal systolic function (ejection fraction normal). Small left ventricle. Mild concentric hypertrophy. Age-appropriate left ventricular diastolic function. · RV: Not well visualized. Signed by: Mariah Lazaro MD on 11/2/2021 12:26 PM       Labs:    Recent Results (from the past 48 hour(s))   PTT    Collection Time: 11/01/21  6:20 PM   Result Value Ref Range    aPTT 77.1 (H) 23.0 - 36.4 SEC   COVID-19 RAPID TEST    Collection Time: 11/01/21  6:26 PM   Result Value Ref Range    Specimen source Nasopharyngeal      COVID-19 rapid test Not detected NOTD     CBC WITH AUTOMATED DIFF    Collection Time: 11/02/21  5:07 AM   Result Value Ref Range    WBC 6.5 4.6 - 13.2 K/uL    RBC 4.42 4.35 - 5.65 M/uL    HGB 13.6 13.0 - 16.0 g/dL    HCT 42.1 36.0 - 48.0 %    MCV 95.2 78.0 - 100.0 FL    MCH 30.8 24.0 - 34.0 PG    MCHC 32.3 31.0 - 37.0 g/dL    RDW 13.2 11.6 - 14.5 %    PLATELET 250 490 - 024 K/uL    MPV 10.3 9.2 - 11.8 FL    NEUTROPHILS 61 40 - 73 %    LYMPHOCYTES 21 21 - 52 %    MONOCYTES 12 (H) 3 - 10 %    EOSINOPHILS 5 0 - 5 %    BASOPHILS 1 0 - 2 %    ABS. NEUTROPHILS 4.0 1.8 - 8.0 K/UL    ABS. LYMPHOCYTES 1.4 0.9 - 3.6 K/UL    ABS. MONOCYTES 0.8 0.05 - 1.2 K/UL    ABS. EOSINOPHILS 0.3 0.0 - 0.4 K/UL    ABS.  BASOPHILS 0.0 0.0 - 0.1 K/UL    DF AUTOMATED     METABOLIC PANEL, BASIC    Collection Time: 11/02/21  5:07 AM   Result Value Ref Range    Sodium 139 136 - 145 mmol/L    Potassium 4.1 3.5 - 5.5 mmol/L    Chloride 111 100 - 111 mmol/L    CO2 24 21 - 32 mmol/L    Anion gap 4 3.0 - 18 mmol/L    Glucose 94 74 - 99 mg/dL    BUN 11 7.0 - 18 MG/DL    Creatinine 1.20 0.6 - 1.3 MG/DL    BUN/Creatinine ratio 9 (L) 12 - 20      GFR est AA >60 >60 ml/min/1.73m2    GFR est non-AA >60 >60 ml/min/1.73m2    Calcium 8.1 (L) 8.5 - 10.1 MG/DL   MAGNESIUM    Collection Time: 11/02/21  5:07 AM   Result Value Ref Range    Magnesium 2.1 1.6 - 2.6 mg/dL   ECHO ADULT COMPLETE    Collection Time: 11/02/21 11:26 AM   Result Value Ref Range    IVSd 1.31 (A) 0.60 - 1.00 cm    LVIDd 3.92 (A) 4.20 - 5.90 cm    LVIDs 3.10 cm    LVOT d 1.90 cm    LVPWd 1.20 (A) 0.60 - 1.00 cm    LVOT Peak Gradient 2.14 mmHg    Left Ventricular Outflow Tract Mean Gradient 1.29 mmHg    LVOT SV 45.3 mL    LVOT Peak Velocity 73.16 cm/s    LVOT VTI 16.01 cm    LA Volume 34.11 18.0 - 58.0 mL    LA Area 4C 13.07 cm2    LA Vol 2C 36.14 18.00 - 58.00 mL    LA Vol 4C 28.67 18.00 - 58.00 mL    MV A Hernando 79.72 cm/s    Mitral Valve E Wave Deceleration Time 237.46 ms    MV E Hernando 49.14 cm/s    Ao Root D 3.25 cm    MV E/A 0.62     LV Mass .7 88 - 224 g    LV Mass AL Index 94.3 49 - 115 g/m2    LA Vol Index 18.74 16 - 28 ml/m2    LA Vol Index 19.86 16 - 28 ml/m2    LA Vol Index 15.75 16 - 28 ml/m2   METABOLIC PANEL, BASIC    Collection Time: 11/03/21 12:26 AM   Result Value Ref Range    Sodium 140 136 - 145 mmol/L    Potassium 4.2 3.5 - 5.5 mmol/L    Chloride 112 (H) 100 - 111 mmol/L    CO2 24 21 - 32 mmol/L    Anion gap 4 3.0 - 18 mmol/L    Glucose 93 74 - 99 mg/dL    BUN 12 7.0 - 18 MG/DL    Creatinine 1.35 (H) 0.6 - 1.3 MG/DL    BUN/Creatinine ratio 9 (L) 12 - 20      GFR est AA >60 >60 ml/min/1.73m2    GFR est non-AA 54 (L) >60 ml/min/1.73m2    Calcium 9.0 8.5 - 10.1 MG/DL   CBC WITH AUTOMATED DIFF    Collection Time: 11/03/21 12:26 AM   Result Value Ref Range    WBC 7.9 4.6 - 13.2 K/uL    RBC 4.37 4.35 - 5.65 M/uL    HGB 13.3 13.0 - 16.0 g/dL    HCT 42.7 36.0 - 48.0 %    MCV 97.7 78.0 - 100.0 FL    MCH 30.4 24.0 - 34.0 PG    MCHC 31.1 31.0 - 37.0 g/dL    RDW 13.3 11.6 - 14.5 %    PLATELET 032 789 - 224 K/uL    MPV 10.5 9.2 - 11.8 FL    NEUTROPHILS 64 40 - 73 %    LYMPHOCYTES 19 (L) 21 - 52 %    MONOCYTES 12 (H) 3 - 10 %    EOSINOPHILS 5 0 - 5 %    BASOPHILS 1 0 - 2 %    ABS. NEUTROPHILS 5.0 1.8 - 8.0 K/UL    ABS. LYMPHOCYTES 1.5 0.9 - 3.6 K/UL    ABS. MONOCYTES 0.9 0.05 - 1.2 K/UL    ABS. EOSINOPHILS 0.4 0.0 - 0.4 K/UL    ABS. BASOPHILS 0.0 0.0 - 0.1 K/UL    DF AUTOMATED         Signed By: Miguelito Boykin MD     November 3, 2021      I spent 25 minutes with the patient in face-to-face consultation, of which greater than 50% was spent in counseling and coordination of care as described above    Disclaimer: Sections of this note are dictated using utilizing voice recognition software. Minor typographical errors may be present. If questions arise, please do not hesitate to contact me or call our department.

## 2021-11-03 NOTE — ROUTINE PROCESS
Bedside and Verbal shift change report given to Charlaine Claude, LPN (oncoming nurse) by Ra Goodman RN   (offgoing nurse). Report included the following information SBAR, Kardex, Intake/Output, MAR and Recent Results.

## 2021-11-04 ENCOUNTER — ANESTHESIA (OUTPATIENT)
Dept: CARDIOTHORACIC SURGERY | Age: 61
DRG: 038 | End: 2021-11-04
Payer: COMMERCIAL

## 2021-11-04 LAB
ANION GAP SERPL CALC-SCNC: 2 MMOL/L (ref 3–18)
APPEARANCE UR: CLEAR
BASOPHILS # BLD: 0.1 K/UL (ref 0–0.1)
BASOPHILS NFR BLD: 1 % (ref 0–2)
BILIRUB UR QL: NEGATIVE
BUN SERPL-MCNC: 14 MG/DL (ref 7–18)
BUN/CREAT SERPL: 9 (ref 12–20)
CALCIUM SERPL-MCNC: 8.8 MG/DL (ref 8.5–10.1)
CHLORIDE SERPL-SCNC: 110 MMOL/L (ref 100–111)
CO2 SERPL-SCNC: 26 MMOL/L (ref 21–32)
COLOR UR: YELLOW
CREAT SERPL-MCNC: 1.61 MG/DL (ref 0.6–1.3)
DIFFERENTIAL METHOD BLD: ABNORMAL
EOSINOPHIL # BLD: 0.5 K/UL (ref 0–0.4)
EOSINOPHIL NFR BLD: 5 % (ref 0–5)
ERYTHROCYTE [DISTWIDTH] IN BLOOD BY AUTOMATED COUNT: 13.3 % (ref 11.6–14.5)
GLUCOSE SERPL-MCNC: 104 MG/DL (ref 74–99)
GLUCOSE UR STRIP.AUTO-MCNC: NEGATIVE MG/DL
HCT VFR BLD AUTO: 43.7 % (ref 36–48)
HGB BLD-MCNC: 13.6 G/DL (ref 13–16)
HGB UR QL STRIP: NEGATIVE
KETONES UR QL STRIP.AUTO: NEGATIVE MG/DL
LEUKOCYTE ESTERASE UR QL STRIP.AUTO: NEGATIVE
LYMPHOCYTES # BLD: 1.6 K/UL (ref 0.9–3.6)
LYMPHOCYTES NFR BLD: 17 % (ref 21–52)
MCH RBC QN AUTO: 30 PG (ref 24–34)
MCHC RBC AUTO-ENTMCNC: 31.1 G/DL (ref 31–37)
MCV RBC AUTO: 96.3 FL (ref 78–100)
MONOCYTES # BLD: 1.1 K/UL (ref 0.05–1.2)
MONOCYTES NFR BLD: 12 % (ref 3–10)
NEUTS SEG # BLD: 6.2 K/UL (ref 1.8–8)
NEUTS SEG NFR BLD: 66 % (ref 40–73)
NITRITE UR QL STRIP.AUTO: NEGATIVE
PH UR STRIP: 6.5 [PH] (ref 5–8)
PLATELET # BLD AUTO: 234 K/UL (ref 135–420)
PMV BLD AUTO: 10.2 FL (ref 9.2–11.8)
POTASSIUM SERPL-SCNC: 3.7 MMOL/L (ref 3.5–5.5)
PROT UR STRIP-MCNC: NEGATIVE MG/DL
RBC # BLD AUTO: 4.54 M/UL (ref 4.35–5.65)
SODIUM SERPL-SCNC: 138 MMOL/L (ref 136–145)
SP GR UR REFRACTOMETRY: 1.01 (ref 1–1.03)
UROBILINOGEN UR QL STRIP.AUTO: 0.2 EU/DL (ref 0.2–1)
WBC # BLD AUTO: 9.4 K/UL (ref 4.6–13.2)

## 2021-11-04 PROCEDURE — C1768 GRAFT, VASCULAR: HCPCS | Performed by: SURGERY

## 2021-11-04 PROCEDURE — 76942 ECHO GUIDE FOR BIOPSY: CPT | Performed by: ANESTHESIOLOGY

## 2021-11-04 PROCEDURE — 36415 COLL VENOUS BLD VENIPUNCTURE: CPT

## 2021-11-04 PROCEDURE — 65620000000 HC RM CCU GENERAL

## 2021-11-04 PROCEDURE — 77030009814 HC LDWRE ECG PHIL -B

## 2021-11-04 PROCEDURE — 74011000250 HC RX REV CODE- 250: Performed by: NURSE ANESTHETIST, CERTIFIED REGISTERED

## 2021-11-04 PROCEDURE — 74011250636 HC RX REV CODE- 250/636: Performed by: SURGERY

## 2021-11-04 PROCEDURE — 74011250636 HC RX REV CODE- 250/636: Performed by: NURSE ANESTHETIST, CERTIFIED REGISTERED

## 2021-11-04 PROCEDURE — 03CJ0ZZ EXTIRPATION OF MATTER FROM LEFT COMMON CAROTID ARTERY, OPEN APPROACH: ICD-10-PCS | Performed by: SURGERY

## 2021-11-04 PROCEDURE — 77030019896 HC KT ARTERIAL LN TELE -B: Performed by: SURGERY

## 2021-11-04 PROCEDURE — 74011250637 HC RX REV CODE- 250/637: Performed by: SURGERY

## 2021-11-04 PROCEDURE — 77030010507 HC ADH SKN DERMBND J&J -B: Performed by: SURGERY

## 2021-11-04 PROCEDURE — 80048 BASIC METABOLIC PNL TOTAL CA: CPT

## 2021-11-04 PROCEDURE — 74011250637 HC RX REV CODE- 250/637: Performed by: INTERNAL MEDICINE

## 2021-11-04 PROCEDURE — 77030040830 HC CATH URETH FOL MDII -A: Performed by: SURGERY

## 2021-11-04 PROCEDURE — 03UJ0JZ SUPPLEMENT LEFT COMMON CAROTID ARTERY WITH SYNTHETIC SUBSTITUTE, OPEN APPROACH: ICD-10-PCS | Performed by: SURGERY

## 2021-11-04 PROCEDURE — 36620 INSERTION CATHETER ARTERY: CPT | Performed by: ANESTHESIOLOGY

## 2021-11-04 PROCEDURE — 99232 SBSQ HOSP IP/OBS MODERATE 35: CPT | Performed by: HOSPITALIST

## 2021-11-04 PROCEDURE — 77030018390 HC SPNG HEMSTAT2 J&J -B: Performed by: SURGERY

## 2021-11-04 PROCEDURE — 74011000250 HC RX REV CODE- 250: Performed by: SURGERY

## 2021-11-04 PROCEDURE — 64450 NJX AA&/STRD OTHER PN/BRANCH: CPT | Performed by: ANESTHESIOLOGY

## 2021-11-04 PROCEDURE — 2709999900 HC NON-CHARGEABLE SUPPLY

## 2021-11-04 PROCEDURE — 00350 ANES PX MAJOR VSL NECK NOS: CPT | Performed by: NURSE ANESTHETIST, CERTIFIED REGISTERED

## 2021-11-04 PROCEDURE — 77030018836 HC SOL IRR NACL ICUM -A: Performed by: SURGERY

## 2021-11-04 PROCEDURE — 2709999900 HC NON-CHARGEABLE SUPPLY: Performed by: SURGERY

## 2021-11-04 PROCEDURE — 85025 COMPLETE CBC W/AUTO DIFF WBC: CPT

## 2021-11-04 PROCEDURE — 88304 TISSUE EXAM BY PATHOLOGIST: CPT

## 2021-11-04 PROCEDURE — 76060000035 HC ANESTHESIA 2 TO 2.5 HR: Performed by: SURGERY

## 2021-11-04 PROCEDURE — 00350 ANES PX MAJOR VSL NECK NOS: CPT | Performed by: ANESTHESIOLOGY

## 2021-11-04 PROCEDURE — 74011000258 HC RX REV CODE- 258: Performed by: NURSE ANESTHETIST, CERTIFIED REGISTERED

## 2021-11-04 PROCEDURE — 81003 URINALYSIS AUTO W/O SCOPE: CPT

## 2021-11-04 PROCEDURE — 77030002996 HC SUT SLK J&J -A: Performed by: SURGERY

## 2021-11-04 PROCEDURE — 74011000250 HC RX REV CODE- 250

## 2021-11-04 PROCEDURE — 77030013797 HC KT TRNSDUC PRSSR EDWD -A: Performed by: SURGERY

## 2021-11-04 PROCEDURE — 77030002987 HC SUT PROL J&J -B: Performed by: SURGERY

## 2021-11-04 PROCEDURE — 77030040361 HC SLV COMPR DVT MDII -B: Performed by: SURGERY

## 2021-11-04 PROCEDURE — 77030010512 HC APPL CLP LIG J&J -C: Performed by: SURGERY

## 2021-11-04 PROCEDURE — 76010000222 HC CV SURG 2 TO 2.5 HR INTENSV-TIER 1: Performed by: SURGERY

## 2021-11-04 PROCEDURE — 77030040922 HC BLNKT HYPOTHRM STRY -A: Performed by: SURGERY

## 2021-11-04 PROCEDURE — 77030002986 HC SUT PROL J&J -A: Performed by: SURGERY

## 2021-11-04 PROCEDURE — 77030002933 HC SUT MCRYL J&J -A: Performed by: SURGERY

## 2021-11-04 DEVICE — XENOSURE BIOLOGIC PATCH, 0.8CM X 8CM, EIFU
Type: IMPLANTABLE DEVICE | Site: CAROTID | Status: FUNCTIONAL
Brand: XENOSURE BIOLOGIC PATCH

## 2021-11-04 RX ORDER — ONDANSETRON 2 MG/ML
4 INJECTION INTRAMUSCULAR; INTRAVENOUS
Status: DISCONTINUED | OUTPATIENT
Start: 2021-11-04 | End: 2021-11-06 | Stop reason: HOSPADM

## 2021-11-04 RX ORDER — HYDROMORPHONE HYDROCHLORIDE 1 MG/ML
0.5 INJECTION, SOLUTION INTRAMUSCULAR; INTRAVENOUS; SUBCUTANEOUS
Status: DISCONTINUED | OUTPATIENT
Start: 2021-11-04 | End: 2021-11-06 | Stop reason: HOSPADM

## 2021-11-04 RX ORDER — HEPARIN SODIUM 200 [USP'U]/100ML
INJECTION, SOLUTION INTRAVENOUS
Status: COMPLETED | OUTPATIENT
Start: 2021-11-04 | End: 2021-11-04

## 2021-11-04 RX ORDER — SODIUM CHLORIDE 0.9 % (FLUSH) 0.9 %
5-40 SYRINGE (ML) INJECTION AS NEEDED
Status: DISCONTINUED | OUTPATIENT
Start: 2021-11-04 | End: 2021-11-06 | Stop reason: HOSPADM

## 2021-11-04 RX ORDER — LIDOCAINE HYDROCHLORIDE 10 MG/ML
INJECTION, SOLUTION EPIDURAL; INFILTRATION; INTRACAUDAL; PERINEURAL AS NEEDED
Status: DISCONTINUED | OUTPATIENT
Start: 2021-11-04 | End: 2021-11-04 | Stop reason: HOSPADM

## 2021-11-04 RX ORDER — SODIUM CHLORIDE 0.9 % (FLUSH) 0.9 %
5-40 SYRINGE (ML) INJECTION EVERY 8 HOURS
Status: DISCONTINUED | OUTPATIENT
Start: 2021-11-04 | End: 2021-11-06 | Stop reason: HOSPADM

## 2021-11-04 RX ORDER — DEXMEDETOMIDINE HYDROCHLORIDE 4 UG/ML
INJECTION, SOLUTION INTRAVENOUS AS NEEDED
Status: DISCONTINUED | OUTPATIENT
Start: 2021-11-04 | End: 2021-11-04 | Stop reason: HOSPADM

## 2021-11-04 RX ORDER — SODIUM CHLORIDE, SODIUM LACTATE, POTASSIUM CHLORIDE, CALCIUM CHLORIDE 600; 310; 30; 20 MG/100ML; MG/100ML; MG/100ML; MG/100ML
50 INJECTION, SOLUTION INTRAVENOUS ONCE
Status: DISCONTINUED | OUTPATIENT
Start: 2021-11-04 | End: 2021-11-04

## 2021-11-04 RX ORDER — CEFAZOLIN SODIUM 1 G/3ML
INJECTION, POWDER, FOR SOLUTION INTRAMUSCULAR; INTRAVENOUS AS NEEDED
Status: DISCONTINUED | OUTPATIENT
Start: 2021-11-04 | End: 2021-11-04 | Stop reason: HOSPADM

## 2021-11-04 RX ORDER — PROPOFOL 10 MG/ML
VIAL (ML) INTRAVENOUS
Status: DISCONTINUED | OUTPATIENT
Start: 2021-11-04 | End: 2021-11-04 | Stop reason: HOSPADM

## 2021-11-04 RX ORDER — HEPARIN SODIUM 1000 [USP'U]/ML
INJECTION, SOLUTION INTRAVENOUS; SUBCUTANEOUS AS NEEDED
Status: DISCONTINUED | OUTPATIENT
Start: 2021-11-04 | End: 2021-11-04 | Stop reason: HOSPADM

## 2021-11-04 RX ORDER — ROPIVACAINE HYDROCHLORIDE 5 MG/ML
INJECTION, SOLUTION EPIDURAL; INFILTRATION; PERINEURAL
Status: DISPENSED
Start: 2021-11-04 | End: 2021-11-04

## 2021-11-04 RX ORDER — PHENYLEPHRINE HCL IN 0.9% NACL 1 MG/10 ML
SYRINGE (ML) INTRAVENOUS AS NEEDED
Status: DISCONTINUED | OUTPATIENT
Start: 2021-11-04 | End: 2021-11-04 | Stop reason: HOSPADM

## 2021-11-04 RX ORDER — PROPOFOL 10 MG/ML
INJECTION, EMULSION INTRAVENOUS AS NEEDED
Status: DISCONTINUED | OUTPATIENT
Start: 2021-11-04 | End: 2021-11-04 | Stop reason: HOSPADM

## 2021-11-04 RX ORDER — OXYCODONE AND ACETAMINOPHEN 5; 325 MG/1; MG/1
1 TABLET ORAL
Status: DISCONTINUED | OUTPATIENT
Start: 2021-11-04 | End: 2021-11-06 | Stop reason: HOSPADM

## 2021-11-04 RX ORDER — HEPARIN SODIUM 200 [USP'U]/100ML
INJECTION, SOLUTION INTRAVENOUS
Status: DISPENSED
Start: 2021-11-04 | End: 2021-11-04

## 2021-11-04 RX ORDER — DEXTROSE MONOHYDRATE AND SODIUM CHLORIDE 5; .45 G/100ML; G/100ML
80 INJECTION, SOLUTION INTRAVENOUS CONTINUOUS
Status: DISCONTINUED | OUTPATIENT
Start: 2021-11-04 | End: 2021-11-06 | Stop reason: HOSPADM

## 2021-11-04 RX ORDER — LIDOCAINE HYDROCHLORIDE 10 MG/ML
INJECTION, SOLUTION EPIDURAL; INFILTRATION; INTRACAUDAL; PERINEURAL
Status: DISPENSED
Start: 2021-11-04 | End: 2021-11-04

## 2021-11-04 RX ORDER — NALOXONE HYDROCHLORIDE 0.4 MG/ML
0.4 INJECTION, SOLUTION INTRAMUSCULAR; INTRAVENOUS; SUBCUTANEOUS AS NEEDED
Status: DISCONTINUED | OUTPATIENT
Start: 2021-11-04 | End: 2021-11-06 | Stop reason: HOSPADM

## 2021-11-04 RX ORDER — FENTANYL CITRATE 50 UG/ML
INJECTION, SOLUTION INTRAMUSCULAR; INTRAVENOUS AS NEEDED
Status: DISCONTINUED | OUTPATIENT
Start: 2021-11-04 | End: 2021-11-04 | Stop reason: HOSPADM

## 2021-11-04 RX ORDER — EPHEDRINE SULFATE/0.9% NACL/PF 25 MG/5 ML
SYRINGE (ML) INTRAVENOUS AS NEEDED
Status: DISCONTINUED | OUTPATIENT
Start: 2021-11-04 | End: 2021-11-04 | Stop reason: HOSPADM

## 2021-11-04 RX ORDER — SODIUM CHLORIDE, SODIUM LACTATE, POTASSIUM CHLORIDE, CALCIUM CHLORIDE 600; 310; 30; 20 MG/100ML; MG/100ML; MG/100ML; MG/100ML
INJECTION, SOLUTION INTRAVENOUS
Status: DISCONTINUED | OUTPATIENT
Start: 2021-11-04 | End: 2021-11-04 | Stop reason: HOSPADM

## 2021-11-04 RX ORDER — DIPHENHYDRAMINE HYDROCHLORIDE 50 MG/ML
INJECTION, SOLUTION INTRAMUSCULAR; INTRAVENOUS AS NEEDED
Status: DISCONTINUED | OUTPATIENT
Start: 2021-11-04 | End: 2021-11-04 | Stop reason: HOSPADM

## 2021-11-04 RX ORDER — ASPIRIN 325 MG
325 TABLET, DELAYED RELEASE (ENTERIC COATED) ORAL DAILY
Status: DISCONTINUED | OUTPATIENT
Start: 2021-11-05 | End: 2021-11-06 | Stop reason: HOSPADM

## 2021-11-04 RX ADMIN — PROPOFOL 25 MCG/KG/MIN: 10 INJECTION, EMULSION INTRAVENOUS at 11:44

## 2021-11-04 RX ADMIN — Medication 200 MCG: at 11:56

## 2021-11-04 RX ADMIN — DEXTROSE MONOHYDRATE AND SODIUM CHLORIDE 80 ML/HR: 5; .45 INJECTION, SOLUTION INTRAVENOUS at 14:25

## 2021-11-04 RX ADMIN — Medication 10 MG: at 11:27

## 2021-11-04 RX ADMIN — HYDROMORPHONE HYDROCHLORIDE 0.5 MG: 1 INJECTION, SOLUTION INTRAMUSCULAR; INTRAVENOUS; SUBCUTANEOUS at 16:46

## 2021-11-04 RX ADMIN — Medication 10 ML: at 16:37

## 2021-11-04 RX ADMIN — SODIUM NITROPRUSSIDE 30 MCG/MIN: 25 INJECTION, SOLUTION, CONCENTRATE INTRAVENOUS at 12:56

## 2021-11-04 RX ADMIN — Medication 200 MCG: at 11:59

## 2021-11-04 RX ADMIN — HEPARIN SODIUM 5000 UNITS: 1000 INJECTION, SOLUTION INTRAVENOUS; SUBCUTANEOUS at 12:40

## 2021-11-04 RX ADMIN — PROPOFOL 30 MG: 10 INJECTION, EMULSION INTRAVENOUS at 12:25

## 2021-11-04 RX ADMIN — PROPOFOL 30 MG: 10 INJECTION, EMULSION INTRAVENOUS at 12:13

## 2021-11-04 RX ADMIN — CEFAZOLIN SODIUM 2 G: 1 INJECTION, POWDER, FOR SOLUTION INTRAMUSCULAR; INTRAVENOUS at 12:16

## 2021-11-04 RX ADMIN — SODIUM CHLORIDE, SODIUM LACTATE, POTASSIUM CHLORIDE, CALCIUM CHLORIDE: 600; 310; 30; 20 INJECTION, SOLUTION INTRAVENOUS at 11:25

## 2021-11-04 RX ADMIN — Medication 200 MCG: at 12:30

## 2021-11-04 RX ADMIN — PRAVASTATIN SODIUM 80 MG: 20 TABLET ORAL at 21:13

## 2021-11-04 RX ADMIN — DEXMEDETOMIDINE HYDROCHLORIDE 0.5 MCG: 100 INJECTION, SOLUTION, CONCENTRATE INTRAVENOUS at 11:43

## 2021-11-04 RX ADMIN — Medication 20 MG: at 12:02

## 2021-11-04 RX ADMIN — OXYCODONE HYDROCHLORIDE AND ACETAMINOPHEN 1 TABLET: 5; 325 TABLET ORAL at 16:46

## 2021-11-04 RX ADMIN — FENTANYL CITRATE 50 MCG: 50 INJECTION, SOLUTION INTRAMUSCULAR; INTRAVENOUS at 11:45

## 2021-11-04 RX ADMIN — Medication 200 MCG: at 12:33

## 2021-11-04 RX ADMIN — BUPROPION HYDROCHLORIDE 150 MG: 150 TABLET, FILM COATED, EXTENDED RELEASE ORAL at 06:09

## 2021-11-04 RX ADMIN — OXYCODONE HYDROCHLORIDE AND ACETAMINOPHEN 1 TABLET: 5; 325 TABLET ORAL at 21:20

## 2021-11-04 RX ADMIN — Medication 200 MCG: at 11:37

## 2021-11-04 RX ADMIN — TOPIRAMATE 100 MG: 100 TABLET, FILM COATED ORAL at 19:30

## 2021-11-04 RX ADMIN — FENTANYL CITRATE 50 MCG: 50 INJECTION, SOLUTION INTRAMUSCULAR; INTRAVENOUS at 11:55

## 2021-11-04 RX ADMIN — Medication 100 MCG: at 11:38

## 2021-11-04 RX ADMIN — DEXMEDETOMIDINE HYDROCHLORIDE 4 MCG: 100 INJECTION, SOLUTION, CONCENTRATE INTRAVENOUS at 12:21

## 2021-11-04 RX ADMIN — Medication 10 MG: at 11:39

## 2021-11-04 RX ADMIN — DIPHENHYDRAMINE HYDROCHLORIDE 25 MG: 50 INJECTION, SOLUTION INTRAMUSCULAR; INTRAVENOUS at 12:16

## 2021-11-04 RX ADMIN — DEXMEDETOMIDINE HYDROCHLORIDE 4 MCG: 100 INJECTION, SOLUTION, CONCENTRATE INTRAVENOUS at 12:24

## 2021-11-04 RX ADMIN — Medication 200 MCG: at 11:43

## 2021-11-04 RX ADMIN — HYDROMORPHONE HYDROCHLORIDE 0.5 MG: 1 INJECTION, SOLUTION INTRAMUSCULAR; INTRAVENOUS; SUBCUTANEOUS at 19:37

## 2021-11-04 RX ADMIN — PROPOFOL 30 MG: 10 INJECTION, EMULSION INTRAVENOUS at 12:21

## 2021-11-04 RX ADMIN — NORTRIPTYLINE HYDROCHLORIDE 75 MG: 25 CAPSULE ORAL at 21:13

## 2021-11-04 RX ADMIN — Medication 10 MG: at 11:36

## 2021-11-04 RX ADMIN — SODIUM CHLORIDE, SODIUM LACTATE, POTASSIUM CHLORIDE, AND CALCIUM CHLORIDE: 600; 310; 30; 20 INJECTION, SOLUTION INTRAVENOUS at 11:41

## 2021-11-04 RX ADMIN — PHENYLEPHRINE HYDROCHLORIDE 80 MCG/MIN: 10 INJECTION INTRAVENOUS at 11:50

## 2021-11-04 RX ADMIN — Medication 20 MG: at 11:40

## 2021-11-04 NOTE — ANESTHESIA PREPROCEDURE EVALUATION
Relevant Problems   NEUROLOGY   (+) Migraine, unspecified, with intractable migraine, so stated, without mention of status migrainosus      CARDIOVASCULAR   (+) CAD in native artery       Anesthetic History   No history of anesthetic complications            Review of Systems / Medical History  Patient summary reviewed and pertinent labs reviewed    Pulmonary            Asthma : well controlled       Neuro/Psych         TIA     Cardiovascular              Past MI and CAD         GI/Hepatic/Renal  Within defined limits              Endo/Other  Within defined limits           Other Findings              Physical Exam    Airway  Mallampati: II  TM Distance: 4 - 6 cm  Neck ROM: normal range of motion   Mouth opening: Normal     Cardiovascular  Regular rate and rhythm,  S1 and S2 normal,  no murmur, click, rub, or gallop             Dental    Dentition: Caps/crowns     Pulmonary  Breath sounds clear to auscultation               Abdominal  GI exam deferred       Other Findings            Anesthetic Plan    ASA: 3  Anesthesia type: regional, MAC and general - backup - cervical plexus block    Monitoring Plan: Arterial line      Induction: Intravenous  Anesthetic plan and risks discussed with: Patient

## 2021-11-04 NOTE — ROUTINE PROCESS
TRANSFER - IN REPORT:    Verbal report received from 2020 [facility] on Elio Masterson  being received from [facility] for ordered procedure      Report consisted of patients Situation, Background, Assessment and   Recommendations(SBAR). Information from the following report(s) SBAR and Kardex was reviewed with the receiving nurse. Opportunity for questions and clarification was provided. Assessment completed upon patients arrival to unit and care assumed.

## 2021-11-04 NOTE — PROGRESS NOTES
14:00  Received pt from OR to rm 2351. Pt. With incision to left neck with dermabond and open to air. JUDY drain noted. 14:30  Pt. Oriented x 4.  equal.  Eyes PERRLA. Pt. 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 Dorsi/plantar flexion equal.  Tongue midline. Pt. Denies numbness/tingling. 16:45  Pt. Medicated for 8/10 jaw pain. 18:00  JUDY drain emptied for a  total of 55 ml serosanguinous drainage. Pt. Declined dinner  19:00  Bedside and Verbal shift change report given to Carly Sotelo RN (oncoming nurse) by Radha Lin Rn (offgoing nurse). Report included the following information SBAR, Kardex, ED Summary, OR Summary, Procedure Summary, Intake/Output, MAR, Recent Results and Cardiac Rhythm Sinus rhythm/ sinus bradycardia.

## 2021-11-04 NOTE — ROUTINE PROCESS
TRANSFER - OUT REPORT:    Verbal report given to [place] RN on Loura Leyden  being transferred to [facility] for ordered procedure       Report consisted of patients Situation, Background, Assessment and   Recommendations(SBAR). Information from the following report(s) SBAR and Kardex was reviewed with the receiving nurse. Lines:   Peripheral IV 10/30/21 Right Antecubital (Active)   Site Assessment Clean, dry, & intact 11/03/21 2000   Phlebitis Assessment 0 11/03/21 2000   Infiltration Assessment 0 11/03/21 2000   Dressing Status Clean, dry, & intact 11/03/21 2000   Dressing Type Transparent 11/03/21 2000   Hub Color/Line Status Pink 11/03/21 0817   Action Taken Open ports on tubing capped 11/03/21 0817   Alcohol Cap Used Yes 11/03/21 2000       Peripheral IV 10/30/21 Left Forearm (Active)   Site Assessment Clean, dry, & intact 11/03/21 2000   Phlebitis Assessment 0 11/03/21 2000   Infiltration Assessment 0 11/03/21 2000   Dressing Status Clean, dry, & intact 11/03/21 2000   Dressing Type Transparent 11/03/21 2000   Hub Color/Line Status Blue 11/03/21 0817   Action Taken Open ports on tubing capped 11/03/21 0817   Alcohol Cap Used Yes 11/03/21 0817        Opportunity for questions and clarification was provided.       Patient transported with:   [employer]

## 2021-11-04 NOTE — ANESTHESIA PROCEDURE NOTES
Arterial Line Placement    Start time: 11/4/2021 11:40 AM  End time: 11/4/2021 11:43 AM  Performed by:  Natasha Cali CRNA  Authorized by: Boris Doshi DO     Pre-Procedure  Indications:  Arterial pressure monitoring  Preanesthetic Checklist: patient identified, risks and benefits discussed, anesthesia consent, site marked, patient being monitored, timeout performed and patient being monitored    Timeout Time: 11:40 EDT        Procedure:   Prep:  Alcohol  Seldinger Technique?: Yes    Orientation:  Right  Location:  Radial artery  Catheter size:  20 G  Number of attempts:  1  Cont Cardiac Output Sensor: No      Assessment:   Post-procedure:  Line secured and sterile dressing applied  Patient Tolerance:  Patient tolerated the procedure well with no immediate complications

## 2021-11-04 NOTE — ROUTINE PROCESS
TRANSFER - IN REPORT:    Verbal report received from [facility]) on Brent Bucco  being received from [address] [place] [address](unit) for ordered procedure      Report consisted of patients Situation, Background, Assessment and   Recommendations(SBAR). Information from the following report(s) SBAR and Kardex was reviewed with the receiving nurse. Opportunity for questions and clarification was provided. Assessment completed upon patients arrival to unit and care assumed.

## 2021-11-04 NOTE — PROGRESS NOTES
TRANSFER - OUT REPORT:    Verbal report given to Desmond Hernandez RN(name) on Melchor Cabezas  being transferred to [facility] ICU(unit) for routine post - op       Report consisted of patients Situation, Background, Assessment and   Recommendations(SBAR). Information from the following report(s) SBAR, Kardex, Procedure Summary, Recent Results, Procedure Verification and Quality Measures was reviewed with the receiving nurse. Lines:   Peripheral IV 10/30/21 Right Antecubital (Active)   Site Assessment Clean, dry, & intact 11/03/21 2000   Phlebitis Assessment 0 11/03/21 2000   Infiltration Assessment 0 11/03/21 2000   Dressing Status Clean, dry, & intact 11/03/21 2000   Dressing Type Transparent 11/03/21 2000   Hub Color/Line Status Pink 11/03/21 0817   Action Taken Open ports on tubing capped 11/03/21 0817   Alcohol Cap Used Yes 11/03/21 2000       Peripheral IV 10/30/21 Left Forearm (Active)   Site Assessment Clean, dry, & intact 11/03/21 2000   Phlebitis Assessment 0 11/03/21 2000   Infiltration Assessment 0 11/03/21 2000   Dressing Status Clean, dry, & intact 11/03/21 2000   Dressing Type Transparent 11/03/21 2000   Hub Color/Line Status Blue 11/03/21 0817   Action Taken Open ports on tubing capped 11/03/21 0817   Alcohol Cap Used Yes 11/03/21 0817       Arterial Line 11/04/21 Right Radial artery (Active)        Opportunity for questions and clarification was provided.       Patient transported with:   Monitor  O2 @ 2 liters  Registered Nurse  [facility]

## 2021-11-04 NOTE — PROGRESS NOTES
[facility]  Progress Note    Patient: Colin Connors Age: 63 y.o. : 1960 MR#: [id number] SSN: [id number]  Date/Time: 2021     Subjective:   NAEO: Pt in OR this morning. Assessment/Plan:     65 y/o male with hx of CAD, PAD, TIA, and right CEA presenting with symptomatic left carotid stenosis being admitted for planned left CEA. #Left ICA stenosis: 80% and symptomatic with HA and right sided heaviness. Plan for CEA on   - Vascular and neurology consulted  - ASA 325mg  - Holding  Plavix 75mg prior to surgery. Resume per [facility] recs  - Home pravastatin    #ROBERTO: Mild increase in Cr from 1.2-1.6. Likely pre-renal in setting of NPO prior to surgery and decreased eating prior to that. Will ensure adequate hydration post-op and monitor Cr.   - Avoid nephrotoxins  -Daily BMP    #CAD:   - Antiplatelet as above  - Home Toprol 25mg     #Elevated BP: Intermittently elevated. Acceptable at present given severe carotid stenosis. Will consider antihypertensives if needed after CEA. #Headaches:   - Home Topomax 100mg  - Home nortriptyline 50mg     #Mood Disorder:   - Home Wellbutrin   - holding home Celexa     #GERD:   - Home protonix      Code: Full   DVT: Lovenox (held prior to procedure), SCD  Objective:   VS:   Visit Vitals  /80 (BP 1 Location: Left upper arm, BP Patient Position: Lying)   Pulse 65   Temp 97.8 °F (36.6 °C)   Resp 20   Ht 5' 6\" (1.676 m)   Wt 72.6 kg (160 lb)   SpO2 99%   BMI 25.82 kg/m²      Tmax/24hrs: Temp (24hrs), Av.2 °F (36.8 °C), Min:97.8 °F (36.6 °C), Max:98.7 °F (37.1 °C)  IOBRIEF    Intake/Output Summary (Last 24 hours) at 2021 1143  Last data filed at 11/3/2021 2000  Gross per 24 hour   Intake 720 ml   Output --   Net 720 ml       General:  Alert, cooperative, no acute distress    HEENT: PERRLA, anicteric sclerae. Pulmonary:  CTA Bilaterally. No Wheezing/Rhonchi/Rales. Cardiovascular: Regular rate and Rhythm.   GI: Soft, Non distended, Non tender. + Bowel sounds. Extremities:  No edema, cyanosis, clubbing. No calf tenderness. Psych: Good insight. Not anxious or agitated. Neurologic: Alert and oriented X 3. Right had  strength 4+/5.  Left hand 5/5   Additional:    Medications:   Current Facility-Administered Medications   Medication Dose Route Frequency    lidocaine (PF) (XYLOCAINE) 10 mg/mL (1 %) injection        heparinized saline 2 units/mL 1,000 unit/500 mL infusion        ropivacaine (PF) (NAROPIN) 5 mg/mL (0.5 %) injection        aspirin chewable tablet 324 mg  324 mg Oral DAILY    acetaminophen (TYLENOL) tablet 650 mg  650 mg Oral Q6H PRN    prochlorperazine (COMPAZINE) injection 10 mg  10 mg IntraVENous Q6H PRN    magnesium hydroxide (MILK OF MAGNESIA) 400 mg/5 mL oral suspension 30 mL  30 mL Oral DAILY PRN    albuterol-ipratropium (DUO-NEB) 2.5 MG-0.5 MG/3 ML  3 mL Nebulization Q4H PRN    buPROPion XL (WELLBUTRIN XL) tablet 150 mg  150 mg Oral 7am    metoprolol succinate (TOPROL-XL) XL tablet 25 mg  25 mg Oral DAILY    nitroglycerin (NITROSTAT) tablet 0.4 mg  0.4 mg SubLINGual Q5MIN PRN    nortriptyline (PAMELOR) capsule 75 mg  75 mg Oral QHS    pravastatin (PRAVACHOL) tablet 80 mg  80 mg Oral QHS    pantoprazole (PROTONIX) tablet 40 mg  40 mg Oral DAILY    topiramate (TOPAMAX) tablet 100 mg  100 mg Oral BID    [Held by provider] enoxaparin (LOVENOX) injection 40 mg  40 mg SubCUTAneous Q24H    [Held by provider] clopidogreL (PLAVIX) tablet 75 mg  75 mg Oral DAILY     Facility-Administered Medications Ordered in Other Encounters   Medication Dose Route Frequency    PHENYLephrine (DARIO-SYNEPHRINE) 100 mcg/mL in NS syringe   IntraVENous PRN    ePHEDrine in NS (PF) injection   IntraVENous PRN       Labs:    Recent Results (from the past 24 hour(s))   URINALYSIS W/ RFLX MICROSCOPIC    Collection Time: 11/04/21  1:15 AM   Result Value Ref Range    Color YELLOW      Appearance CLEAR      Specific gravity 1.011 1.005 - 1.030      pH (UA) 6.5 5.0 - 8.0      Protein Negative NEG mg/dL    Glucose Negative NEG mg/dL    Ketone Negative NEG mg/dL    Bilirubin Negative NEG      Blood Negative NEG      Urobilinogen 0.2 0.2 - 1.0 EU/dL    Nitrites Negative NEG      Leukocyte Esterase Negative NEG     METABOLIC PANEL, BASIC    Collection Time: 11/04/21  3:34 AM   Result Value Ref Range    Sodium 138 136 - 145 mmol/L    Potassium 3.7 3.5 - 5.5 mmol/L    Chloride 110 100 - 111 mmol/L    CO2 26 21 - 32 mmol/L    Anion gap 2 (L) 3.0 - 18 mmol/L    Glucose 104 (H) 74 - 99 mg/dL    BUN 14 7.0 - 18 MG/DL    Creatinine 1.61 (H) 0.6 - 1.3 MG/DL    BUN/Creatinine ratio 9 (L) 12 - 20      GFR est AA 54 (L) >60 ml/min/1.73m2    GFR est non-AA 44 (L) >60 ml/min/1.73m2    Calcium 8.8 8.5 - 10.1 MG/DL   CBC WITH AUTOMATED DIFF    Collection Time: 11/04/21  3:34 AM   Result Value Ref Range    WBC 9.4 4.6 - 13.2 K/uL    RBC 4.54 4.35 - 5.65 M/uL    HGB 13.6 13.0 - 16.0 g/dL    HCT 43.7 36.0 - 48.0 %    MCV 96.3 78.0 - 100.0 FL    MCH 30.0 24.0 - 34.0 PG    MCHC 31.1 31.0 - 37.0 g/dL    RDW 13.3 11.6 - 14.5 %    PLATELET 700 801 - 907 K/uL    MPV 10.2 9.2 - 11.8 FL    NEUTROPHILS 66 40 - 73 %    LYMPHOCYTES 17 (L) 21 - 52 %    MONOCYTES 12 (H) 3 - 10 %    EOSINOPHILS 5 0 - 5 %    BASOPHILS 1 0 - 2 %    ABS. NEUTROPHILS 6.2 1.8 - 8.0 K/UL    ABS. LYMPHOCYTES 1.6 0.9 - 3.6 K/UL    ABS. MONOCYTES 1.1 0.05 - 1.2 K/UL    ABS. EOSINOPHILS 0.5 (H) 0.0 - 0.4 K/UL    ABS.  BASOPHILS 0.1 0.0 - 0.1 K/UL    DF AUTOMATED         Signed By: Honorio Eubanks MD     November 4, 2021

## 2021-11-04 NOTE — PROGRESS NOTES
Patient lying in bed watching tv this shift. Asleep at this time. No complaints of pain or discomfort. Breathing regular and unlabored.

## 2021-11-04 NOTE — ANESTHESIA PROCEDURE NOTES
Peripheral Block    Start time: 11/4/2021 11:32 AM  End time: 11/4/2021 11:40 AM  Performed by: Lauri Awad DO  Authorized by: Lauri Awad DO       Pre-procedure: Indications: at surgeon's request and primary anesthetic    Timeout Time: 11:32 EDT          Block Type:   Block Type:  Cervical plexus  Laterality:  Left  Monitoring:  Responsive to questions, standard ASA monitoring, continuous pulse ox, oxygen, frequent vital sign checks and heart rate  Injection Technique:  Single shot  Procedures: other (comment)    Patient Position: supine  Prep: alcohol    : left neck.   Needle Type:  Stimuplex  Needle Gauge:  25 G  Needle Localization:  Anatomical landmarks  Med Admin Time: 11/4/2021 11:40 AM    Assessment:  Number of attempts:  1  Injection Assessment:  Incremental injection every 5 mL, negative aspiration for CSF, no paresthesia, negative aspiration for blood and no intravascular symptoms  Patient tolerance:  Patient tolerated the procedure well with no immediate complications

## 2021-11-05 LAB
ANION GAP SERPL CALC-SCNC: 5 MMOL/L (ref 3–18)
BASOPHILS # BLD: 0 K/UL (ref 0–0.1)
BASOPHILS NFR BLD: 0 % (ref 0–2)
BUN SERPL-MCNC: 13 MG/DL (ref 7–18)
BUN/CREAT SERPL: 10 (ref 12–20)
CALCIUM SERPL-MCNC: 8 MG/DL (ref 8.5–10.1)
CHLORIDE SERPL-SCNC: 108 MMOL/L (ref 100–111)
CO2 SERPL-SCNC: 23 MMOL/L (ref 21–32)
CREAT SERPL-MCNC: 1.33 MG/DL (ref 0.6–1.3)
DIFFERENTIAL METHOD BLD: ABNORMAL
EOSINOPHIL # BLD: 0.3 K/UL (ref 0–0.4)
EOSINOPHIL NFR BLD: 3 % (ref 0–5)
ERYTHROCYTE [DISTWIDTH] IN BLOOD BY AUTOMATED COUNT: 13.4 % (ref 11.6–14.5)
GLUCOSE SERPL-MCNC: 140 MG/DL (ref 74–99)
HCT VFR BLD AUTO: 39.2 % (ref 36–48)
HGB BLD-MCNC: 12 G/DL (ref 13–16)
LYMPHOCYTES # BLD: 1.3 K/UL (ref 0.9–3.6)
LYMPHOCYTES NFR BLD: 13 % (ref 21–52)
MCH RBC QN AUTO: 29.9 PG (ref 24–34)
MCHC RBC AUTO-ENTMCNC: 30.6 G/DL (ref 31–37)
MCV RBC AUTO: 97.5 FL (ref 78–100)
MONOCYTES # BLD: 1.2 K/UL (ref 0.05–1.2)
MONOCYTES NFR BLD: 12 % (ref 3–10)
NEUTS SEG # BLD: 6.7 K/UL (ref 1.8–8)
NEUTS SEG NFR BLD: 71 % (ref 40–73)
PLATELET # BLD AUTO: 222 K/UL (ref 135–420)
PMV BLD AUTO: 9.7 FL (ref 9.2–11.8)
POTASSIUM SERPL-SCNC: 3.3 MMOL/L (ref 3.5–5.5)
RBC # BLD AUTO: 4.02 M/UL (ref 4.35–5.65)
SODIUM SERPL-SCNC: 136 MMOL/L (ref 136–145)
WBC # BLD AUTO: 9.5 K/UL (ref 4.6–13.2)

## 2021-11-05 PROCEDURE — 2709999900 HC NON-CHARGEABLE SUPPLY

## 2021-11-05 PROCEDURE — 74011250637 HC RX REV CODE- 250/637: Performed by: INTERNAL MEDICINE

## 2021-11-05 PROCEDURE — 74011250636 HC RX REV CODE- 250/636: Performed by: SURGERY

## 2021-11-05 PROCEDURE — 94762 N-INVAS EAR/PLS OXIMTRY CONT: CPT

## 2021-11-05 PROCEDURE — 74011250637 HC RX REV CODE- 250/637: Performed by: SURGERY

## 2021-11-05 PROCEDURE — 65620000000 HC RM CCU GENERAL

## 2021-11-05 PROCEDURE — 74011250637 HC RX REV CODE- 250/637: Performed by: PSYCHIATRY & NEUROLOGY

## 2021-11-05 PROCEDURE — 80048 BASIC METABOLIC PNL TOTAL CA: CPT

## 2021-11-05 PROCEDURE — 74011000250 HC RX REV CODE- 250: Performed by: SURGERY

## 2021-11-05 PROCEDURE — 85025 COMPLETE CBC W/AUTO DIFF WBC: CPT

## 2021-11-05 PROCEDURE — 36415 COLL VENOUS BLD VENIPUNCTURE: CPT

## 2021-11-05 PROCEDURE — 99232 SBSQ HOSP IP/OBS MODERATE 35: CPT | Performed by: HOSPITALIST

## 2021-11-05 PROCEDURE — 77030012890

## 2021-11-05 PROCEDURE — 77010033678 HC OXYGEN DAILY

## 2021-11-05 RX ADMIN — Medication 10 ML: at 22:00

## 2021-11-05 RX ADMIN — METOPROLOL SUCCINATE 25 MG: 25 TABLET, EXTENDED RELEASE ORAL at 09:11

## 2021-11-05 RX ADMIN — PRAVASTATIN SODIUM 80 MG: 20 TABLET ORAL at 21:48

## 2021-11-05 RX ADMIN — Medication 10 ML: at 14:00

## 2021-11-05 RX ADMIN — TOPIRAMATE 100 MG: 100 TABLET, FILM COATED ORAL at 09:11

## 2021-11-05 RX ADMIN — DEXTROSE MONOHYDRATE AND SODIUM CHLORIDE 80 ML/HR: 5; .45 INJECTION, SOLUTION INTRAVENOUS at 02:56

## 2021-11-05 RX ADMIN — PANTOPRAZOLE SODIUM 40 MG: 40 TABLET, DELAYED RELEASE ORAL at 09:11

## 2021-11-05 RX ADMIN — NORTRIPTYLINE HYDROCHLORIDE 75 MG: 25 CAPSULE ORAL at 21:48

## 2021-11-05 RX ADMIN — CLOPIDOGREL BISULFATE 75 MG: 75 TABLET ORAL at 09:44

## 2021-11-05 RX ADMIN — ASPIRIN 81 MG CHEWABLE TABLET 324 MG: 81 TABLET CHEWABLE at 09:11

## 2021-11-05 RX ADMIN — HYDROMORPHONE HYDROCHLORIDE 0.5 MG: 1 INJECTION, SOLUTION INTRAMUSCULAR; INTRAVENOUS; SUBCUTANEOUS at 06:41

## 2021-11-05 RX ADMIN — OXYCODONE HYDROCHLORIDE AND ACETAMINOPHEN 1 TABLET: 5; 325 TABLET ORAL at 02:56

## 2021-11-05 RX ADMIN — HYDROMORPHONE HYDROCHLORIDE 0.5 MG: 1 INJECTION, SOLUTION INTRAMUSCULAR; INTRAVENOUS; SUBCUTANEOUS at 00:55

## 2021-11-05 RX ADMIN — TOPIRAMATE 100 MG: 100 TABLET, FILM COATED ORAL at 17:58

## 2021-11-05 RX ADMIN — BUPROPION HYDROCHLORIDE 150 MG: 150 TABLET, FILM COATED, EXTENDED RELEASE ORAL at 07:53

## 2021-11-05 RX ADMIN — OXYCODONE HYDROCHLORIDE AND ACETAMINOPHEN 1 TABLET: 5; 325 TABLET ORAL at 21:52

## 2021-11-05 RX ADMIN — OXYCODONE HYDROCHLORIDE AND ACETAMINOPHEN 1 TABLET: 5; 325 TABLET ORAL at 07:53

## 2021-11-05 NOTE — PROGRESS NOTES
Problem: Falls - Risk of  Goal: *Absence of Falls  Description: Document Rigoberto Carrilloelle Fall Risk and appropriate interventions in the flowsheet. Outcome: Progressing Towards Goal  Note: Fall Risk Interventions:  Mobility Interventions: Assess mobility with egress test, Communicate number of staff needed for ambulation/transfer, Strengthening exercises (ROM-active/passive)    Mentation Interventions: Adequate sleep, hydration, pain control, Evaluate medications/consider consulting pharmacy, More frequent rounding, Reorient patient, Room close to nurse's station    Medication Interventions: Assess postural VS orthostatic hypotension, Evaluate medications/consider consulting pharmacy, Patient to call before getting OOB, Teach patient to arise slowly    Elimination Interventions: Call light in reach, Patient to call for help with toileting needs, Stay With Me (per policy), Toilet paper/wipes in reach, Toileting schedule/hourly rounds, Urinal in reach              Problem: Patient Education: Go to Patient Education Activity  Goal: Patient/Family Education  Outcome: Progressing Towards Goal     Problem: Pressure Injury - Risk of  Goal: *Prevention of pressure injury  Description: Document Randell Scale and appropriate interventions in the flowsheet. Outcome: Progressing Towards Goal  Note: Pressure Injury Interventions:  Sensory Interventions: Assess changes in LOC, Avoid rigorous massage over bony prominences, Turn and reposition approx. every two hours (pillows and wedges if needed), Chair cushion         Activity Interventions: Assess need for specialty bed, Chair cushion, Increase time out of bed    Mobility Interventions: Assess need for specialty bed, Pressure redistribution bed/mattress (bed type), Turn and reposition approx.  every two hours(pillow and wedges), Chair cushion    Nutrition Interventions: Document food/fluid/supplement intake, Discuss nutritional consult with provider    Friction and Shear Interventions: Apply protective barrier, creams and emollients, Foam dressings/transparent film/skin sealants, HOB 30 degrees or less, Lift sheet

## 2021-11-05 NOTE — PROGRESS NOTES
0700- Report received from Viry Naqvi, [address] [place] [address]. Will assume care of the patient. 1100- Patient successfully voided following anthony catheter removal.    1200- Pt reports dizziness on standing. Dr Natalia Sung aware and will re-evaluate later this afternoon for possible discharge home.

## 2021-11-05 NOTE — PROGRESS NOTES
Postop day 1 from left carotid arterectomy patch angioplasty regarding symptomatic left carotid stenosis.   Appear to be ruptured plaque  No new neurologic symptoms no complications  Neck is soft and intact  Remove drain without difficulty  Patient sitting up in chair tells me has no complaints  Okay from my standpoint for discharge with Plavix therapy  Follow-up in office for postop care  Happy with surgical outcomes

## 2021-11-05 NOTE — PROGRESS NOTES
Chart reviewed. No d/c needs at this time. CM will continue to follow along.         KATERYNA VilledaN RN  Care Management  Pager: [phone]

## 2021-11-05 NOTE — DISCHARGE INSTRUCTIONS
Patient Education        Carotid Endarterectomy: What to Expect at Home  Your Recovery  A carotid endarterectomy (say \"frederic-RAW-michael dasilvaue-kth-knh-RANDELL-thai-wang\") is surgery to remove fatty build-up (plaque) from one of the carotid arteries. Your doctor made a cut (incision) in your neck and carotid artery to take out the plaque. You may have a sore throat for a few days. You can expect the incision to be sore for about a week. The area around it may also be swollen and bruised at first. The area in front of the incision may be numb. This usually gets better after 6 to 12 months. Your doctor closed the incision in your neck with stitches. The stitches will be removed 7 to 10 days after surgery, or you may have stitches that dissolve on their own. You may feel more tired than usual for several weeks after surgery. You will probably be able to go back to work or your usual activities in 1 to 2 weeks. This care sheet gives you a general idea about how long it will take for you to recover. But each person recovers at a different pace. Follow the steps below to feel better as quickly as possible. How can you care for yourself at home? Activity    · Rest when you feel tired. Getting enough sleep will help you recover.     · Try to walk each day. Start by walking a little more than you did the day before. Bit by bit, increase the amount you walk. Walking boosts blood flow and helps prevent pneumonia and constipation.     · Avoid strenuous activities, such as bicycle riding, jogging, weight lifting, or aerobic exercise. Your doctor will tell you when it's okay to do strenuous activity.     · For 1 to 2 weeks, avoid lifting anything that would make you strain. This may include a child, heavy grocery bags and milk containers, a heavy briefcase or backpack, cat litter or dog food bags, or a vacuum .     · Ask your doctor when you can drive again.     · You will probably need to take 1 to 2 weeks off from work.  It depends on the type of work you do and how you feel.     · You may shower and take baths as usual. But do not soak the incision for the first 2 weeks, or until your doctor tells you it is okay. Pat the incision dry.     · Your doctor will tell you when you can have sex again. Diet    · You can eat your normal diet. If your stomach is upset, try bland, low-fat foods like plain rice, broiled chicken, toast, and yogurt.     · Drink plenty of fluids (unless your doctor tells you not to).     · You may notice that your bowel movements are not regular right after your surgery. This is common. Try to avoid constipation and straining with bowel movements. You may want to take a fiber supplement every day. If you have not had a bowel movement after a couple of days, ask your doctor about taking a mild laxative. Medicines    · Your doctor will tell you if and when you can restart your medicines. He or she will also give you instructions about taking any new medicines.     · If you take aspirin or some other blood thinner, ask your doctor if and when to start taking it again. Make sure that you understand exactly what your doctor wants you to do.     · Your doctor may advise you to take aspirin when you go home. This helps prevent blood clots. Take your medicines exactly as prescribed. Call your doctor if you think you are having a problem with your medicine.     · Be safe with medicines. Take pain medicines exactly as directed. ? If the doctor gave you a prescription medicine for pain, take it as prescribed. ? If you are not taking a prescription pain medicine, ask your doctor if you can take an over-the-counter medicine. ? Do not take two or more pain medicines at the same time unless the doctor told you to. Many pain medicines have acetaminophen, which is Tylenol.  Too much acetaminophen (Tylenol) can be harmful.     · If you think your pain medicine is making you sick to your stomach:  ? Take your medicine after meals (unless your doctor has told you not to). ? Ask your doctor for a different pain medicine.     · If your doctor prescribed antibiotics, take them as directed. Do not stop taking them just because you feel better. You need to take the full course of antibiotics.     · If you take a blood thinner, such as aspirin, be sure you get instructions about how to take your medicine safely. Blood thinners can cause serious bleeding problems. Incision care    · If you have strips of tape over your incision, leave the tape on for a week or until it falls off.     · Wash the area daily with water and pat it dry. Other cleaning products, such as hydrogen peroxide, can make the wound heal more slowly. You may cover the area with a gauze bandage if it weeps or rubs against clothing. Change the bandage every day.     · Keep the area clean and dry. Follow-up care is a key part of your treatment and safety. Be sure to make and go to all appointments, and call your doctor if you are having problems. It's also a good idea to know your test results and keep a list of the medicines you take. When should you call for help? Call 911 anytime you think you may need emergency care. For example, call if:    · You passed out (lost consciousness).     · You have severe trouble breathing.     · You have a tight bulge in your neck on the side where the surgery was done.     · You have symptoms of a stroke. These may include:  ? Sudden numbness, tingling, weakness, or loss of movement in your face, arm, or leg, especially on only one side of your body. ? Sudden vision changes. ? Sudden trouble speaking. ? Sudden confusion or trouble understanding simple statements. ? Sudden problems with walking or balance. ? A sudden, severe headache that is different from past headaches.     · You have chest pain or pressure. This may occur with:  ? Sweating. ? Shortness of breath. ? Nausea or vomiting.   ? Pain that spreads from the chest to the neck, jaw, or one or both shoulders or arms. ? Dizziness or lightheadedness. ? A fast or uneven pulse. After calling 911, chew 1 adult-strength or 2 to 4 low-dose aspirin. Wait for an ambulance. Do not try to drive yourself. Call your doctor now or seek immediate medical care if:    · You have pain that does not get better after you take pain medicine.     · You have loose stitches, or your incision comes open.     · Bright red blood has soaked through the bandage over your incision.     · You have signs of infection, such as:  ? Increased pain, swelling, warmth, or redness. ? Red streaks leading from the incision. ? Pus draining from the incision. ? A fever. Watch closely for any changes in your health, and be sure to contact your doctor if you have any problems. Where can you learn more? Go to http://www.gray.com/  Enter D902 in the search box to learn more about \"Carotid Endarterectomy: What to Expect at Home. \"  Current as of: April 29, 2021               Content Version: 13.0  © 8470-7990 [employer]. Care instructions adapted under license by [employer] (which disclaims liability or warranty for this information). If you have questions about a medical condition or this instruction, always ask your healthcare professional. [place] [address] any warranty or liability for your use of this information.

## 2021-11-05 NOTE — OP NOTES
Preoperative diagnosis: Symptomatic critical left carotid stenosis    Postoperative diagnosis: Same    Procedures performed:  #1  Left carotid endarterectomy with patch angioplasty      Cultures: None    Specimens: None    Drains: None    Estimated blood loss: Less than 50 mL    Assistants: None    Implants: Please see above    Complications: None    Anesthesia: Cervical block, A-line placement. Indications for the procedure:  Esther Ramirez is a 63 y.o. with symptomatic left carotid stenosis. Patient was given the appropriate risk and benefits of the procedure including but not limited to bleeding, infection, damage to adjacent structures, MI, stroke, death, loss of lower extremity, need for further surgery. Patient was understanding of all the risks and underwent a procedure. Operative findings:   #1  Left carotid endarterectomy with patch angioplasty without neurological deficit    Procedure:  Patient was correctly identified in the pre operative area and taken to the operating room in stable condition. Patient had pre-incision timeout prior to any incision. Patient was prepped and draped in the normal sterile fashion according to [facility] guidelines aseptic technique. Localized the main incision on the left neck over the area of the carotid bulb is seen on CAT scan. Carried down through skin soft tissue divide the platysma using electrocautery. Raise platysmal flaps continued on the anterior medial aspect of the sternocleidomastoid muscle. Identified the common carotid and placed a double vessel loop keeping the vagus nerve posterior. Dissected out the bulb and placed a double loop around the proximal external carotid. Then dissected up high in position and placed a single loop around the internal half above the area of disease. Heparinized the patient. After appropriate waiting.   Clamped the internal and the common and the external.  Incised the common across the bulb onto the internal.  There appeared to be hemorrhagic lesion here endarterectomized the entire site. With all this debris free I tacked down the edges with interrupted 7-0 Prolene sutures. Repaired the site with a bovine pericardial patch and a circular 6-0 Prolene suture just prior to completion backbled the sites and completed the patch angioplasty the patient had been without neurological deficit throughout the procedure. Irrigated and placed a drain. Closed the platysma over the site interrupted 3-0 Monocryl's for Monocryl and Dermabond glue for the skin. Is transferred to the ICU for postop management with no complication.

## 2021-11-05 NOTE — PROGRESS NOTES
1900-Bedside turnover from KEVIN Berger RN. Pt asleep when entering the room but easily arousalable. C/o pain 8/10 in left jaw/neck area near incisions. VSS. Right radial ART line not working well, attempted to fix it but was unsuccessfully. Cuff pressures with MAPs in 80s. Anthony in place. UOP 40-75/hr.  equal, tongue midline. No facial weakness noted, pt did say he had some left sided facial numbness but he did receive a nerve block. NSR on the monitor. Respirations greater than 12. Some slight bleeding at JUDY drain site but no hematoma formation, surgical incision clean/dry, intact. 1937-Pt given PRN Dilaudid. See pain assessment. 2120-Evening medications reviewed with pt. Given PRN Percocet, see pain assessment. 2215-Pt appears to be asleep, MAP 84. NSR. Will cont to monitor. 2300-Pt resting, VSS. NSR. Call bell within reach. 0100-Pt resting, VSS, NSR. Slight oozing at JUDY drain site, surgical incison, clean/dry, no hematoma. 0300-Pt resting, given PRN for pain, NSR. MAP 77. Anthony output marginal. Encouraged PO intake. 0600-AM care, anthony pulled. Pt provided with urinal. ART line removed. Up to recliner. NSR. MAP stable. 0700-Bedside and verbal report given to Kapil KHAN RN.

## 2021-11-05 NOTE — PROGRESS NOTES
[facility]ist [facility]  Progress Note    Patient: Loura Leyden Age: 63 y.o. : 1960 MR#: [id number] SSN: [id number]  Date/Time: 2021     Subjective:   Pt endorsing mild sense of dizziness. Denies HA, weakness, vision changes, vertigo, or FND. Otherwise feels well. Would like to discharge tomorrow. Assessment/Plan:     65 y/o male with hx of CAD, PAD, TIA, and right CEA presenting with symptomatic left carotid stenosis now POD 1 from left CEA. #Left ICA stenosis s/p CEA: Recovering well. No evidence of new neuro deficit. - Vascular consulted  - Neurology consulted  - ASA 325mg  - Plavix 75mg   - Home pravastatin    #ROBERTO: Initially mild, now resolved. Cr. 1.2->1.3->1.6->1.3. Will monitor and maintain good hydration status. .   - Avoid nephrotoxins  -Daily BMP    #CAD:   - Antiplatelet as above  - Home Toprol 25mg     #HTN: BP well controlled pos-op. Will resume home meds BP begins to become elevated. #Headaches:   - Home Topomax 100mg  - Home nortriptyline 50mg     #Mood Disorder:   - Home Wellbutrin   - holding home Celexa     #GERD:   - Home protonix      Code: Full   DVT: SCD  Objective:   VS:   Visit Vitals  /61 (BP 1 Location: Left upper arm, BP Patient Position: At rest)   Pulse 74   Temp 97.6 °F (36.4 °C)   Resp 10   Ht 5' 6\" (1.676 m)   Wt 72.6 kg (160 lb)   SpO2 95%   BMI 25.82 kg/m²      Tmax/24hrs: Temp (24hrs), Av.8 °F (36.6 °C), Min:97.3 °F (36.3 °C), Max:99.2 °F (37.3 °C)  IOBRIEF    Intake/Output Summary (Last 24 hours) at 2021 0913  Last data filed at 2021 0800  Gross per 24 hour   Intake 1086.67 ml   Output 1002 ml   Net 84.67 ml       General:  Alert, cooperative, no acute distress    HEENT: anicteric sclerae. CEA incision C/D/I  Pulmonary:  CTA Bilaterally. No Wheezing/Rhonchi/Rales. Cardiovascular: Regular rate and Rhythm. GI:  Soft, Non distended, Non tender. + Bowel sounds.   Extremities:  No edema, cyanosis, clubbing. No calf tenderness. Psych: Good insight. Not anxious or agitated. Neurologic: Alert and oriented X 3.    Additional:    Medications:   Current Facility-Administered Medications   Medication Dose Route Frequency    dextrose 5 % - 0.45% NaCl infusion  80 mL/hr IntraVENous CONTINUOUS    sodium chloride (NS) flush 5-40 mL  5-40 mL IntraVENous Q8H    sodium chloride (NS) flush 5-40 mL  5-40 mL IntraVENous PRN    naloxone (NARCAN) injection 0.4 mg  0.4 mg IntraVENous PRN    aspirin delayed-release tablet 325 mg  325 mg Oral DAILY    oxyCODONE-acetaminophen (PERCOCET) 5-325 mg per tablet 1 Tablet  1 Tablet Oral Q4H PRN    HYDROmorphone (DILAUDID) syringe 0.5 mg  0.5 mg IntraVENous Q2H PRN    ondansetron (ZOFRAN) injection 4 mg  4 mg IntraVENous Q4H PRN    aspirin chewable tablet 324 mg  324 mg Oral DAILY    acetaminophen (TYLENOL) tablet 650 mg  650 mg Oral Q6H PRN    prochlorperazine (COMPAZINE) injection 10 mg  10 mg IntraVENous Q6H PRN    magnesium hydroxide (MILK OF MAGNESIA) 400 mg/5 mL oral suspension 30 mL  30 mL Oral DAILY PRN    albuterol-ipratropium (DUO-NEB) 2.5 MG-0.5 MG/3 ML  3 mL Nebulization Q4H PRN    buPROPion XL (WELLBUTRIN XL) tablet 150 mg  150 mg Oral 7am    metoprolol succinate (TOPROL-XL) XL tablet 25 mg  25 mg Oral DAILY    nitroglycerin (NITROSTAT) tablet 0.4 mg  0.4 mg SubLINGual Q5MIN PRN    nortriptyline (PAMELOR) capsule 75 mg  75 mg Oral QHS    pravastatin (PRAVACHOL) tablet 80 mg  80 mg Oral QHS    pantoprazole (PROTONIX) tablet 40 mg  40 mg Oral DAILY    topiramate (TOPAMAX) tablet 100 mg  100 mg Oral BID    [Held by provider] enoxaparin (LOVENOX) injection 40 mg  40 mg SubCUTAneous Q24H    [Held by provider] clopidogreL (PLAVIX) tablet 75 mg  75 mg Oral DAILY       Labs:    Recent Results (from the past 24 hour(s))   METABOLIC PANEL, BASIC    Collection Time: 11/05/21  4:25 AM   Result Value Ref Range    Sodium 136 136 - 145 mmol/L    Potassium 3.3 (L) 3.5 - 5.5 mmol/L    Chloride 108 100 - 111 mmol/L    CO2 23 21 - 32 mmol/L    Anion gap 5 3.0 - 18 mmol/L    Glucose 140 (H) 74 - 99 mg/dL    BUN 13 7.0 - 18 MG/DL    Creatinine 1.33 (H) 0.6 - 1.3 MG/DL    BUN/Creatinine ratio 10 (L) 12 - 20      GFR est AA >60 >60 ml/min/1.73m2    GFR est non-AA 55 (L) >60 ml/min/1.73m2    Calcium 8.0 (L) 8.5 - 10.1 MG/DL   CBC WITH AUTOMATED DIFF    Collection Time: 11/05/21  4:25 AM   Result Value Ref Range    WBC 9.5 4.6 - 13.2 K/uL    RBC 4.02 (L) 4.35 - 5.65 M/uL    HGB 12.0 (L) 13.0 - 16.0 g/dL    HCT 39.2 36.0 - 48.0 %    MCV 97.5 78.0 - 100.0 FL    MCH 29.9 24.0 - 34.0 PG    MCHC 30.6 (L) 31.0 - 37.0 g/dL    RDW 13.4 11.6 - 14.5 %    PLATELET 898 607 - 503 K/uL    MPV 9.7 9.2 - 11.8 FL    NEUTROPHILS 71 40 - 73 %    LYMPHOCYTES 13 (L) 21 - 52 %    MONOCYTES 12 (H) 3 - 10 %    EOSINOPHILS 3 0 - 5 %    BASOPHILS 0 0 - 2 %    ABS. NEUTROPHILS 6.7 1.8 - 8.0 K/UL    ABS. LYMPHOCYTES 1.3 0.9 - 3.6 K/UL    ABS. MONOCYTES 1.2 0.05 - 1.2 K/UL    ABS. EOSINOPHILS 0.3 0.0 - 0.4 K/UL    ABS.  BASOPHILS 0.0 0.0 - 0.1 K/UL    DF AUTOMATED         Signed By: Berna Blizzard, MD     November 5, 2021

## 2021-11-06 VITALS
DIASTOLIC BLOOD PRESSURE: 77 MMHG | SYSTOLIC BLOOD PRESSURE: 128 MMHG | TEMPERATURE: 98.7 F | HEIGHT: 66 IN | WEIGHT: 160 LBS | OXYGEN SATURATION: 98 % | RESPIRATION RATE: 16 BRPM | HEART RATE: 77 BPM | BODY MASS INDEX: 25.71 KG/M2

## 2021-11-06 LAB
ANION GAP SERPL CALC-SCNC: 4 MMOL/L (ref 3–18)
BASOPHILS # BLD: 0 K/UL (ref 0–0.1)
BASOPHILS NFR BLD: 0 % (ref 0–2)
BUN SERPL-MCNC: 12 MG/DL (ref 7–18)
BUN/CREAT SERPL: 9 (ref 12–20)
CALCIUM SERPL-MCNC: 8.5 MG/DL (ref 8.5–10.1)
CHLORIDE SERPL-SCNC: 108 MMOL/L (ref 100–111)
CO2 SERPL-SCNC: 24 MMOL/L (ref 21–32)
CREAT SERPL-MCNC: 1.31 MG/DL (ref 0.6–1.3)
DIFFERENTIAL METHOD BLD: ABNORMAL
EOSINOPHIL # BLD: 0.3 K/UL (ref 0–0.4)
EOSINOPHIL NFR BLD: 3 % (ref 0–5)
ERYTHROCYTE [DISTWIDTH] IN BLOOD BY AUTOMATED COUNT: 13.2 % (ref 11.6–14.5)
GLUCOSE SERPL-MCNC: 100 MG/DL (ref 74–99)
HCT VFR BLD AUTO: 38.3 % (ref 36–48)
HGB BLD-MCNC: 11.8 G/DL (ref 13–16)
LYMPHOCYTES # BLD: 0.8 K/UL (ref 0.9–3.6)
LYMPHOCYTES NFR BLD: 9 % (ref 21–52)
MCH RBC QN AUTO: 29.8 PG (ref 24–34)
MCHC RBC AUTO-ENTMCNC: 30.8 G/DL (ref 31–37)
MCV RBC AUTO: 96.7 FL (ref 78–100)
MONOCYTES # BLD: 1.3 K/UL (ref 0.05–1.2)
MONOCYTES NFR BLD: 14 % (ref 3–10)
NEUTS SEG # BLD: 6.7 K/UL (ref 1.8–8)
NEUTS SEG NFR BLD: 73 % (ref 40–73)
PLATELET # BLD AUTO: 209 K/UL (ref 135–420)
PMV BLD AUTO: 10.1 FL (ref 9.2–11.8)
POTASSIUM SERPL-SCNC: 3.8 MMOL/L (ref 3.5–5.5)
RBC # BLD AUTO: 3.96 M/UL (ref 4.35–5.65)
SODIUM SERPL-SCNC: 136 MMOL/L (ref 136–145)
WBC # BLD AUTO: 9.2 K/UL (ref 4.6–13.2)

## 2021-11-06 PROCEDURE — 74011250637 HC RX REV CODE- 250/637: Performed by: INTERNAL MEDICINE

## 2021-11-06 PROCEDURE — 85025 COMPLETE CBC W/AUTO DIFF WBC: CPT

## 2021-11-06 PROCEDURE — 80048 BASIC METABOLIC PNL TOTAL CA: CPT

## 2021-11-06 PROCEDURE — 36415 COLL VENOUS BLD VENIPUNCTURE: CPT

## 2021-11-06 PROCEDURE — 99239 HOSP IP/OBS DSCHRG MGMT >30: CPT | Performed by: HOSPITALIST

## 2021-11-06 PROCEDURE — 74011250637 HC RX REV CODE- 250/637: Performed by: SURGERY

## 2021-11-06 RX ADMIN — METOPROLOL SUCCINATE 25 MG: 25 TABLET, EXTENDED RELEASE ORAL at 08:57

## 2021-11-06 RX ADMIN — Medication 10 ML: at 06:02

## 2021-11-06 RX ADMIN — ASPIRIN 325 MG: 325 TABLET, COATED ORAL at 08:56

## 2021-11-06 RX ADMIN — TOPIRAMATE 100 MG: 100 TABLET, FILM COATED ORAL at 08:57

## 2021-11-06 RX ADMIN — BUPROPION HYDROCHLORIDE 150 MG: 150 TABLET, FILM COATED, EXTENDED RELEASE ORAL at 08:57

## 2021-11-06 RX ADMIN — PANTOPRAZOLE SODIUM 40 MG: 40 TABLET, DELAYED RELEASE ORAL at 08:56

## 2021-11-06 RX ADMIN — CLOPIDOGREL BISULFATE 75 MG: 75 TABLET ORAL at 08:57

## 2021-11-06 NOTE — PROGRESS NOTES
D/C order noted for today. Orders reviewed. No needs identified at this time. CM remains available if needed.      KATERYNA AnnN, RN  Pager # [phone]  Care Manager

## 2021-11-06 NOTE — DISCHARGE SUMMARY
Hospitalist Discharge Summary    Patient: Minerva Andrews MRN: [id number]  CSN: [id number]    YOB: 1960  Age: 63 y.o. Sex: male    DOA: 10/30/2021 LOS:  LOS: 7 days   Discharge Date:     Admission Diagnoses: Acute right-sided weakness [R53.1]    Discharge Diagnoses:    1. Left ICA stenosis status post left CEA  2. ROBERTO  3. History of coronary artery disease  4. History of hypertension  5. History of mood disorders  6. History of GERD  7. History of headaches    Discharge Condition: [address]    Discharge To: Home    PHYSICAL EXAM  Visit Vitals  /77   Pulse 77   Temp 98.7 °F (37.1 °C)   Resp 16   Ht 5' 6\" (1.676 m)   Wt 72.6 kg (160 lb)   SpO2 98%   BMI 25.82 kg/m²       General: Alert, cooperative, no acute distress    HEENT: PERRLA, EOMI. Anicteric sclerae. Lungs:  CTA Bilaterally. No Wheezing/Rhonchi/Rales. Heart:  Regular rate and Rhythm. Abdomen: Soft, Non distended, Non tender. + Bowel sounds. Extremities: No edema/ cyanosis/ clubbing  Neurologic:  AA oriented X 3. Moves all extremities. HPI:  Minerva Andrews is a 64 y.o.  male who presented to [facility] emergency room on October 30, 2021 from work after patient noticed having right hand heaviness with dizziness. He stated he has history of migraine and started having headaches while he was at work and took Fioricet. He drove himself from work to emergency room. He has off-and-on headaches without any visual disturbances. No nausea or vomiting. No dysphagia. He also had some visual disturbances on the day when he had headaches followed by dizziness.     He feels fine currently. No visual disturbances. No soreness of throat. No dysphagia. Denies any chest pain or shortness of breath or cough. No tingling or numbness. No nausea vomiting or abdominal pain. No bowel or bladder disturbances. No right-sided weakness currently.   He has been taking Topamax 100 mg twice daily, nortriptyline and 150 mg Wellbutrin for migraine headaches at home. He has been taking aspirin, Plavix, metoprolol, Pravachol for his coronary artery disease and previous stroke. Drinks alcohol socially. He quit smoking 7 years 7 years ago.     In the emergency room, CT head and MRI brain were negative. CTA head and neck showed 80% stenosis of left internal carotid artery. ED provider spoke to telemetry neurologist who recommended patient to be started on heparin drip. Hospital Course:   Patient admitted to the hospital secondary to off-and-on headaches without any visual disturbances, no weakness. Patient was diagnosed with left carotid stenosis and was scheduled for a left CEA at the end of the month. Vascular surgery consulted, patient underwent left CEA, tolerated procedure well and is currently at baseline. Patient is being discharged home is advised to closely follow-up with the vascular surgeon in 2 weeks. Follow up Care: With PCP and vascular surgery in 2 weeks    Consults: Vascular Surgery    Significant Diagnostic Studies:     Imaging:  XR Results (most recent):  Results from Hospital Encounter encounter on 05/22/14    XR CHEST PORT    Narrative  Portable Frontal Chest.    CPT CODE: 62489, 03909    CLINICAL HISTORY: Dizziness. CVA and myocardial infarction 4 weeks ago. .    TECHNIQUE: Single frontal view of the chest, obtained portably. COMPARISONS: 5/5/14. FINDINGS:    The lungs are clear. The cardiomediastinal silhouette is unremarkable. Pulmonary vascularity is normal.  There are no effusions. Impression  :    NORMAL CHEST. CT Results (most recent):  Results from [place] encounter on 10/30/21    CTA HEAD NECK W CONT    Narrative  EXAM: CTA HEAD NECK W CONT    CLINICAL INDICATIONS: Right UE and LE weakness    TECHNIQUE: Helical CT scan of the brain and neck were performed at 1.25 mm  intervals during rapid IV bolus contrast administration.   These data were  reconstructed at .625 mm intervals for vascular analysis. The data was also  reviewed at 2.5 mm intervals for accompanying soft tissue analysis. 3D post  processed images, including surface shaded displays, were produced for this exam  on independent console, permanently archived and interpreted. All CT scans at this facility are performed using dose optimization technique as  appropriate to a performed exam, to include automated exposure control,  adjustment of the MA and/or kV according to patient size (including appropriate  matching for site-specific examinations) or use of  iterative reconstruction  technique. CONTRAST: 80 mL intravenous Isovue 370    COMPARISON: 10/21/2021    CTA NECK VASCULAR ANALYSIS:    Aortic arch: Left sided with typical configuration. Atherosclerotic disease  results in mild narrowing of the left subclavian artery. Right carotid:  -CCA: Patent  -ECA: Origin is patent  -ICA: Patent with mild irregularity consistent with prior CEA. 0% stenosis of  proximal ICA by NASCET criteria. Left carotid:  -CCA: Patent  -ECA: Origin is patent  -ICA: Atherosclerotic soft plaque approximately 1 cm above the origin results in  approximately 80% stenosis of proximal ICA by NASCET criteria with minimal  diameter of 1.5 mm. Right vertebral: Patent  Left vertebral: Patent      CTA HEAD VASCULAR ANALYSIS:    Anterior circulation:  -ICA: Atherosclerotic disease of the bilateral cavernous internal carotid  arteries without significant stenosis. -MARLYN: Patent  -MCA: Patent  -AComm: Diminutive  -PComm: Patent bilaterally. Posterior circulation:  -Vertebral: Patent  -Basilar: Patent  -Superior cerebellar: Patent  -PCA: Bilateral hypoplastic P1 segments with vascular supply through the  posterior indicating artery (bilateral fetal PCA).     Major dural venous sinuses: Unremarkable    CTA SOFT TISSUE ANALYSIS:  Lungs: Clear  Upper chest: Unremarkable  Neck: Postsurgical changes in the right neck, related to prior CEA  Lymph nodes: No significant lymphadenopathy  Orbits: Unremarkable  Paranasal sinuses: Small mucous retention cyst/polyp left maxillary sinus. Brain: Redemonstrated chronic encephalomalacia, better evaluated on concurrent  head CT and MRI. Bones: Unremarkable for age. Impression  1. No large vessel occlusion identified. 2.  Approximate 80% stenosis of the left ICA approximately 1 cm from origin. 3.  Patent, nonstenotic changes related to prior right CEA. 10/30/21    ECHO ADULT COMPLETE 11/02/2021 11/2/2021    Interpretation Summary  · IAS: Agitated saline contrast study was performed. There was no shunting at baseline or with Valsalva. · LV: Estimated LVEF is 55 - 60%. Visually measured ejection fraction. Normal systolic function (ejection fraction normal). Small left ventricle. Mild concentric hypertrophy. Age-appropriate left ventricular diastolic function. · RV: Not well visualized. Signed by: Preston Whyte MD on 11/2/2021 12:26 PM         Procedures:   Left CEA    Discharge Medications:     Current Discharge Medication List      CONTINUE these medications which have NOT CHANGED    Details   nortriptyline (PAMELOR) 50 mg capsule Take 3 Capsules by mouth. clopidogreL (PLAVIX) 75 mg tab Take 1 tablet by mouth once daily  Qty: 90 Tablet, Refills: 3      metoprolol succinate (TOPROL-XL) 25 mg XL tablet Take 1 Tab by mouth daily. Qty: 30 Tab, Refills: 11      buPROPion XL (WELLBUTRIN XL) 300 mg XL tablet Take 300 mg by mouth every morning. citalopram (CELEXA) 10 mg tablet TAKE 1 TABLET BY MOUTH ONCE DAILY  Refills: 3      topiramate (TOPAMAX) 100 mg tablet Take 150 mg by mouth two (2) times a day. pravastatin (PRAVACHOL) 80 mg tablet Take 80 mg by mouth nightly. aspirin 81 mg chewable tablet Take 162 mg by mouth daily. pantoprazole (PROTONIX) 40 mg tablet Take 40 mg by mouth daily.     Associated Diagnoses: S/P carotid endarterectomy nitroglycerin (NITROSTAT) 0.4 mg SL tablet 1 Tab by SubLINGual route every five (5) minutes as needed for Chest Pain (up to 3 doses only).   Qty: 30 Tab, Refills: 0             Current Facility-Administered Medications:     dextrose 5 % - 0.45% NaCl infusion, 80 mL/hr, IntraVENous, CONTINUOUS, Carmne Mchugh MD, Stopped at 11/05/21 1100    sodium chloride (NS) flush 5-40 mL, 5-40 mL, IntraVENous, Q8H, Carmen Mchugh MD, 10 mL at 11/06/21 0602    sodium chloride (NS) flush 5-40 mL, 5-40 mL, IntraVENous, PRN, Carmen Mchugh MD    naloxone [facility]) injection 0.4 mg, 0.4 mg, IntraVENous, PRN, Carmen Mchugh MD    aspirin delayed-release tablet 325 mg, 325 mg, Oral, DAILY, Carmen Mchugh MD, 325 mg at 11/06/21 0856    oxyCODONE-acetaminophen (PERCOCET) 5-325 mg per tablet 1 Tablet, 1 Tablet, Oral, Q4H PRN, Joselyn Enriquez MD, 1 Tablet at 11/05/21 2152    HYDROmorphone (DILAUDID) syringe 0.5 mg, 0.5 mg, IntraVENous, Q2H PRN, Carmen Mchugh MD, 0.5 mg at 11/05/21 0641    ondansetron (ZOFRAN) injection 4 mg, 4 mg, IntraVENous, Q4H PRN, Joselyn Enriquez MD    acetaminophen (TYLENOL) tablet 650 mg, 650 mg, Oral, Q6H PRN, Sofía Willis MD    prochlorperazine (COMPAZINE) injection 10 mg, 10 mg, IntraVENous, Q6H PRN, Sofía Willis MD    magnesium hydroxide (MILK OF MAGNESIA) 400 mg/5 mL oral suspension 30 mL, 30 mL, Oral, DAILY PRN, Melo Rojas MD    albuterol-ipratropium (DUO-NEB) 2.5 MG-0.5 MG/3 ML, 3 mL, Nebulization, Q4H PRN, Melo Rojas MD    buPROPion XL (WELLBUTRIN XL) tablet 150 mg, 150 mg, Oral, 7am, Pranav Downing MD, 150 mg at 11/06/21 0857    metoprolol succinate (TOPROL-XL) XL tablet 25 mg, 25 mg, Oral, DAILY, Bernard Pranav Alvarenga MD, 25 mg at 11/06/21 0857    nitroglycerin (NITROSTAT) tablet 0.4 mg, 0.4 mg, SubLINGual, Q5MIN PRN, Sofía Willis MD    nortriptyline (PAMELOR) capsule 75 mg, 75 mg, Oral, QHS, Sofía Willis MD, 75 mg at 11/05/21 2148    pravastatin (PRAVACHOL) tablet 80 mg, 80 mg, Oral, QHS, Pranav Downing MD, 80 mg at 11/05/21 2148    pantoprazole (PROTONIX) tablet 40 mg, 40 mg, Oral, DAILY, Pranav Mackenzie MD, 40 mg at 11/06/21 0856    topiramate (TOPAMAX) tablet 100 mg, 100 mg, Oral, BID, Suri Castorena MD, 100 mg at 11/06/21 0857    [Held by provider] enoxaparin (LOVENOX) injection 40 mg, 40 mg, SubCUTAneous, Q24H, Juice Downing MD    clopidogreL (PLAVIX) tablet 75 mg, 75 mg, Oral, DAILY, Carmen Mchugh MD, 75 mg at 11/06/21 7696     Activity: Activity as tolerated    Diet: Cardiac Diet    Wound Care: None needed      Parth Bertrand MD  11/6/2021, 12:51 PM    Total time spent 32 mins  Disclaimer: Sections of this note are dictated using utilizing voice recognition software. Minor typographical errors may be present. If questions arise, please do not hesitate to contact me or call our department.

## 2021-11-10 RX ORDER — METOPROLOL SUCCINATE 25 MG/1
TABLET, EXTENDED RELEASE ORAL
Qty: 30 TABLET | Refills: 6 | Status: SHIPPED | OUTPATIENT
Start: 2021-11-10 | End: 2022-07-21 | Stop reason: SDUPTHER

## 2021-11-27 ENCOUNTER — HOSPITAL ENCOUNTER (EMERGENCY)
Age: 61
Discharge: HOME | End: 2021-11-27
Attending: EMERGENCY MEDICINE
Payer: COMMERCIAL

## 2021-11-27 VITALS
DIASTOLIC BLOOD PRESSURE: 79 MMHG | WEIGHT: 160 LBS | BODY MASS INDEX: 25.71 KG/M2 | SYSTOLIC BLOOD PRESSURE: 136 MMHG | OXYGEN SATURATION: 100 % | HEIGHT: 66 IN | TEMPERATURE: 97.5 F | HEART RATE: 54 BPM | RESPIRATION RATE: 18 BRPM

## 2021-11-27 DIAGNOSIS — Z51.89 VISIT FOR WOUND CHECK: Primary | ICD-10-CM

## 2021-11-27 LAB
ALBUMIN SERPL-MCNC: 3.7 G/DL (ref 3.4–5)
ALBUMIN/GLOB SERPL: 1.1 {RATIO} (ref 0.8–1.7)
ALP SERPL-CCNC: 76 U/L (ref 45–117)
ALT SERPL-CCNC: 50 U/L (ref 16–61)
ANION GAP SERPL CALC-SCNC: 7 MMOL/L (ref 3–18)
AST SERPL-CCNC: 28 U/L (ref 10–38)
BASOPHILS # BLD: 0.1 K/UL (ref 0–0.1)
BASOPHILS NFR BLD: 1 % (ref 0–2)
BILIRUB SERPL-MCNC: 0.2 MG/DL (ref 0.2–1)
BUN SERPL-MCNC: 13 MG/DL (ref 7–18)
BUN/CREAT SERPL: 9 (ref 12–20)
CALCIUM SERPL-MCNC: 8.7 MG/DL (ref 8.5–10.1)
CHLORIDE SERPL-SCNC: 114 MMOL/L (ref 100–111)
CO2 SERPL-SCNC: 22 MMOL/L (ref 21–32)
CREAT SERPL-MCNC: 1.42 MG/DL (ref 0.6–1.3)
DIFFERENTIAL METHOD BLD: ABNORMAL
EOSINOPHIL # BLD: 0.4 K/UL (ref 0–0.4)
EOSINOPHIL NFR BLD: 5 % (ref 0–5)
ERYTHROCYTE [DISTWIDTH] IN BLOOD BY AUTOMATED COUNT: 12.6 % (ref 11.6–14.5)
GLOBULIN SER CALC-MCNC: 3.5 G/DL (ref 2–4)
GLUCOSE SERPL-MCNC: 102 MG/DL (ref 74–99)
HCT VFR BLD AUTO: 39 % (ref 36–48)
HGB BLD-MCNC: 13.1 G/DL (ref 13–16)
IMM GRANULOCYTES # BLD AUTO: 0 K/UL (ref 0–0.04)
IMM GRANULOCYTES NFR BLD AUTO: 0 % (ref 0–0.5)
LYMPHOCYTES # BLD: 2 K/UL (ref 0.9–3.6)
LYMPHOCYTES NFR BLD: 26 % (ref 21–52)
MCH RBC QN AUTO: 30.5 PG (ref 24–34)
MCHC RBC AUTO-ENTMCNC: 33.6 G/DL (ref 31–37)
MCV RBC AUTO: 90.9 FL (ref 78–100)
MONOCYTES # BLD: 0.8 K/UL (ref 0.05–1.2)
MONOCYTES NFR BLD: 10 % (ref 3–10)
NEUTS SEG # BLD: 4.6 K/UL (ref 1.8–8)
NEUTS SEG NFR BLD: 58 % (ref 40–73)
NRBC # BLD: 0 K/UL (ref 0–0.01)
NRBC BLD-RTO: 0 PER 100 WBC
PLATELET # BLD AUTO: 307 K/UL (ref 135–420)
PMV BLD AUTO: 10 FL (ref 9.2–11.8)
POTASSIUM SERPL-SCNC: 3.8 MMOL/L (ref 3.5–5.5)
PROT SERPL-MCNC: 7.2 G/DL (ref 6.4–8.2)
RBC # BLD AUTO: 4.29 M/UL (ref 4.35–5.65)
SODIUM SERPL-SCNC: 143 MMOL/L (ref 136–145)
WBC # BLD AUTO: 7.9 K/UL (ref 4.6–13.2)

## 2021-11-27 PROCEDURE — 80053 COMPREHEN METABOLIC PANEL: CPT

## 2021-11-27 PROCEDURE — 74011250637 HC RX REV CODE- 250/637: Performed by: EMERGENCY MEDICINE

## 2021-11-27 PROCEDURE — 85025 COMPLETE CBC W/AUTO DIFF WBC: CPT

## 2021-11-27 PROCEDURE — 99283 EMERGENCY DEPT VISIT LOW MDM: CPT

## 2021-11-27 RX ORDER — AMOXICILLIN AND CLAVULANATE POTASSIUM 875; 125 MG/1; MG/1
1 TABLET, FILM COATED ORAL
Status: COMPLETED | OUTPATIENT
Start: 2021-11-27 | End: 2021-11-27

## 2021-11-27 RX ORDER — ROSUVASTATIN CALCIUM 20 MG/1
20 TABLET, COATED ORAL DAILY
COMMUNITY
Start: 2021-09-05 | End: 2021-11-27

## 2021-11-27 RX ORDER — AMOXICILLIN AND CLAVULANATE POTASSIUM 875; 125 MG/1; MG/1
1 TABLET, FILM COATED ORAL 2 TIMES DAILY
Qty: 14 TABLET | Refills: 0 | Status: SHIPPED | OUTPATIENT
Start: 2021-11-27 | End: 2021-12-04

## 2021-11-27 RX ADMIN — AMOXICILLIN AND CLAVULANATE POTASSIUM 1 TABLET: 875; 125 TABLET, FILM COATED ORAL at 14:24

## 2021-11-27 NOTE — DISCHARGE INSTRUCTIONS
1.  Take antibiotics as prescribed. Continue warm compresses at home. 2.  Follow-up with your vascular surgeon, Dr. Arleth Rubin.   3.  Return or seek medical care for fevers, increased pus coming from the site opening of the wound further or any worsening in general.

## 2021-11-27 NOTE — ED TRIAGE NOTES
Patient states having left endarterectomy on 11/4/21. He states procedure was performed by Audrey Lares MD.   Patient states having f/u with Dr. Marleny Valdez last Friday. Advises that he had US performed at that time that demonstrated appropriate function of left carotid artery. He states that he had small amount drainage to suture line to left neck that was noted during visit with Dr. Marleny Valdez. Patient states being prescribed Mupirocin for wound drainage. He states that wound drainage and condition started to improved initially with mupirocin, only to experience increased purulent drainage and wound opening this morning. He c/o tenderness to left side of neck along suture line and c/o left ear pain.

## 2021-11-27 NOTE — Clinical Note
[address] [place] [address] [facility]  [id number] [address] [phone]    Work/School Note    Date: 11/27/2021    To Whom It May concern:    Chandrika Gillespie was seen and treated today in the emergency room by the following provider(s):  Attending Provider: Karen Barba DO. Chandrika Gillespie is excused from work/school on 11/27/21 and 11/28/21. He is medically clear to return to work/school on 11/29/2021.        Sincerely,          Yinka House DO

## 2021-11-27 NOTE — LETTER
[address] [place] [address] [facility]  [id number] [address] [phone]    Work/School Note    Date: 11/27/2021    To Whom It May concern:    Nina Villalba was seen and treated today in the emergency room by the following provider(s):  Attending Provider: Everett Gonzalez DO. Nina Villalba may return to work on 11/29/2021.     Sincerely,          Desmond Araujo RN

## 2021-11-27 NOTE — ED PROVIDER NOTES
EMERGENCY DEPARTMENT HISTORY AND PHYSICAL EXAM    11:06 AM    Date: 11/27/2021  Patient Name: Daksha Huber    History of Presenting Illness     Chief Complaint   Patient presents with    Post-Op Problem    Wound Check       History Provided By: Patient  Location/Duration/Severity/Modifying factors   Patient is a 79-year-old male with past medical history of stroke and TIA presenting to the ED with a chief complaint of concern about old wound from a recent procedure. He had a carotid endarterectomy on 5 November. This with done by Dr. Ira Arechiga reports that he is experiencing some pain around surgical site and a small amount of purulence. He was seen by Dr. Ira Arechiga in the office about 8 days ago where he was given mupirocin ointment. Reports that it seems to getting worse over time is having more pain than he was at the time he was having a small amount of drainage previously but it seems like it is getting little bit worse. He is also noting some mild left ear pain as well. He denies any chest pain or shortness of breath no stroke symptoms. PCP: Ileana Doyle MD    Current Outpatient Medications   Medication Sig Dispense Refill    amoxicillin-clavulanate (Augmentin) 875-125 mg per tablet Take 1 Tablet by mouth two (2) times a day for 7 days. 14 Tablet 0    metoprolol succinate (TOPROL-XL) 25 mg XL tablet Take 1 tablet by mouth once daily 30 Tablet 6    nortriptyline (PAMELOR) 50 mg capsule Take 3 Capsules by mouth.  clopidogreL (PLAVIX) 75 mg tab Take 1 tablet by mouth once daily 90 Tablet 3    buPROPion XL (WELLBUTRIN XL) 300 mg XL tablet Take 300 mg by mouth every morning.  citalopram (CELEXA) 10 mg tablet TAKE 1 TABLET BY MOUTH ONCE DAILY  3    topiramate (TOPAMAX) 100 mg tablet Take 150 mg by mouth two (2) times a day.  pravastatin (PRAVACHOL) 80 mg tablet Take 80 mg by mouth nightly.  pantoprazole (PROTONIX) 40 mg tablet Take 40 mg by mouth daily.       aspirin 81 mg chewable tablet Take 162 mg by mouth daily.  nitroglycerin (NITROSTAT) 0.4 mg SL tablet 1 Tab by SubLINGual route every five (5) minutes as needed for Chest Pain (up to 3 doses only). 30 Tab 0       Past History     Past Medical History:  Past Medical History:   Diagnosis Date    Asthma     [facility]     psoriasis, ulcerative colitis    CAD in native artery     inf STEMI (2014) managed medically due to concomittant stroke; cath performed few weeks later: mild LM, LAD, RCA; 55% AVLCx (dominant).  Colitis     Colon cancer ([facility]     Dyslipidemia     Heart attack ([facility]     Hx of transesophageal echocardiography ([facility]) 2014    EF 50%. Basal-mid inferior hypk. No LA appendage thrombus. No R-L atrial septal shunt by IV contrast.  Mild ALYCE. No cardioembolic source for stroke.  Panic attack     S/P cardiac catheterization 2014    LM patent. LAD (sm) mild. AV-CX 55% w/linear filling defect prox to apex suggesting intimal tear. RCA very sm, nondom.       S/P carotid endarterectomy     R CEA (2014)    Stroke [facility]) 14    Parasthesis right saide, blindness left eye       Past Surgical History:  Past Surgical History:   Procedure Laterality Date    HX APPENDECTOMY      HX CAROTID ENDARTERECTOMY Bilateral     HX HEART CATHETERIZATION      HX HERNIA REPAIR      HX ORTHOPAEDIC Left     Left wrist sx    NEUROLOGICAL PROCEDURE UNLISTED  2014    Cerbral Angiogram    VASCULAR SURGERY PROCEDURE UNLIST      R carotid endartectomy with angioplasty        Family History:  Family History   Problem Relation Age of Onset    Heart Attack Father        Social History:  Social History     Tobacco Use    Smoking status: Former Smoker     Packs/day: 1.00     Years: 41.00     Pack years: 41.00     Types: Cigarettes     Quit date: 2014     Years since quittin.6    Smokeless tobacco: Never Used   Substance Use Topics    Alcohol use: Yes     Comment: social--very light now    Drug use: No       Allergies: Allergies   Allergen Reactions    Cat Dander Itching    Chlorhexidine Gluconate Itching    Dog Dander Itching    Lipitor [Atorvastatin] Other (comments)     dizziness    Seafood Swelling    Zocor [Simvastatin] Vertigo     Same as with lipitor, once on it for about two weeks experienced vertigo, stopped when he discontinued it       I reviewed and confirmed the above information with patient and updated as necessary. Review of Systems     Review of Systems   Constitutional: Negative for chills and fever. HENT: Negative for congestion, rhinorrhea, sinus pressure and sneezing. Eyes: Negative for visual disturbance. Respiratory: Negative for cough and shortness of breath. Cardiovascular: Negative for chest pain. Gastrointestinal: Negative for abdominal pain, diarrhea, nausea and vomiting. Genitourinary: Negative for dysuria, frequency and urgency. Musculoskeletal: Negative for back pain and neck pain. Skin: Positive for color change and wound. Negative for rash. Neurological: Negative for syncope, numbness and headaches. Physical Exam     Visit Vitals  /79 (BP 1 Location: Left upper arm, BP Patient Position: At rest)   Pulse (!) 54   Temp 97.5 °F (36.4 °C)   Resp 18   Ht 5' 6\" (1.676 m)   Wt 72.6 kg (160 lb)   SpO2 100%   BMI 25.82 kg/m²       Physical Exam  Constitutional:       General: He is not in acute distress. Appearance: Normal appearance. He is normal weight. He is not ill-appearing or toxic-appearing. HENT:      Head: Normocephalic and atraumatic. Right Ear: External ear normal.      Left Ear: External ear normal.      Nose: Nose normal.      Mouth/Throat:      Mouth: Mucous membranes are moist.      Pharynx: No oropharyngeal exudate or posterior oropharyngeal erythema. Eyes:      Conjunctiva/sclera: Conjunctivae normal.      Pupils: Pupils are equal, round, and reactive to light.    Neck: Comments: Vertical incision located the left anterior neck most of which is healed there is approximately 2 cm in the upper portion that is mildly  will not include dehisced. He has a small amount of yellow appearing dried drainage on the outside. Minimal redness no fluctuance. Cardiovascular:      Rate and Rhythm: Normal rate and regular rhythm. Pulses: Normal pulses. Heart sounds: Normal heart sounds. No murmur heard. No friction rub. Pulmonary:      Effort: Pulmonary effort is normal.      Breath sounds: Normal breath sounds. No wheezing, rhonchi or rales. Abdominal:      General: Abdomen is flat. Tenderness: There is no abdominal tenderness. There is no guarding or rebound. Musculoskeletal:         General: No swelling or tenderness. Normal range of motion. Cervical back: Normal range of motion and neck supple. Right lower leg: No edema. Left lower leg: No edema. Skin:     General: Skin is warm and dry. Capillary Refill: Capillary refill takes less than 2 seconds. Findings: No rash. Neurological:      General: No focal deficit present. Mental Status: He is alert. Motor: No weakness. Diagnostic Study Results     Labs -  Recent Results (from the past 24 hour(s))   CBC WITH AUTOMATED DIFF    Collection Time: 11/27/21 11:20 AM   Result Value Ref Range    WBC 7.9 4.6 - 13.2 K/uL    RBC 4.29 (L) 4.35 - 5.65 M/uL    HGB 13.1 13.0 - 16.0 g/dL    HCT 39.0 36.0 - 48.0 %    MCV 90.9 78.0 - 100.0 FL    MCH 30.5 24.0 - 34.0 PG    MCHC 33.6 31.0 - 37.0 g/dL    RDW 12.6 11.6 - 14.5 %    PLATELET 095 689 - 722 K/uL    MPV 10.0 9.2 - 11.8 FL    NRBC 0.0 0  WBC    ABSOLUTE NRBC 0.00 0.00 - 0.01 K/uL    NEUTROPHILS 58 40 - 73 %    LYMPHOCYTES 26 21 - 52 %    MONOCYTES 10 3 - 10 %    EOSINOPHILS 5 0 - 5 %    BASOPHILS 1 0 - 2 %    IMMATURE GRANULOCYTES 0 0.0 - 0.5 %    ABS. NEUTROPHILS 4.6 1.8 - 8.0 K/UL    ABS.  LYMPHOCYTES 2.0 0.9 - 3.6 K/UL ABS. MONOCYTES 0.8 0.05 - 1.2 K/UL    ABS. EOSINOPHILS 0.4 0.0 - 0.4 K/UL    ABS. BASOPHILS 0.1 0.0 - 0.1 K/UL    ABS. IMM. GRANS. 0.0 0.00 - 0.04 K/UL    DF AUTOMATED     METABOLIC PANEL, COMPREHENSIVE    Collection Time: 11/27/21 11:20 AM   Result Value Ref Range    Sodium 143 136 - 145 mmol/L    Potassium 3.8 3.5 - 5.5 mmol/L    Chloride 114 (H) 100 - 111 mmol/L    CO2 22 21 - 32 mmol/L    Anion gap 7 3.0 - 18 mmol/L    Glucose 102 (H) 74 - 99 mg/dL    BUN 13 7.0 - 18 MG/DL    Creatinine 1.42 (H) 0.6 - 1.3 MG/DL    BUN/Creatinine ratio 9 (L) 12 - 20      GFR est AA >60 >60 ml/min/1.73m2    GFR est non-AA 51 (L) >60 ml/min/1.73m2    Calcium 8.7 8.5 - 10.1 MG/DL    Bilirubin, total 0.2 0.2 - 1.0 MG/DL    ALT (SGPT) 50 16 - 61 U/L    AST (SGOT) 28 10 - 38 U/L    Alk. phosphatase 76 45 - 117 U/L    Protein, total 7.2 6.4 - 8.2 g/dL    Albumin 3.7 3.4 - 5.0 g/dL    Globulin 3.5 2.0 - 4.0 g/dL    A-G Ratio 1.1 0.8 - 1.7           Radiologic Studies -   No orders to display           Medical Decision Making   I am the first provider for this patient. I reviewed the vital signs, available nursing notes, past medical history, past surgical history, family history and social history. Vital Signs-Reviewed the patient's vital signs. Records Reviewed: Nursing Notes, Old Medical Records, Previous Radiology Studies and Previous Laboratory Studies (Time of Review: 11:06 AM)      Provider Notes (Medical Decision Making):   MDM  Number of Diagnoses or Management Options  Visit for wound check  Diagnosis management comments: 53-year-old male presents the ED with a chief complaint of thigh concerned about his incision from recent CEA. He may have a wound infection but does not clearly have any signs of cellulitis or abscess. Plan to check basic labs and contact his vascular surgeon. Spoke with Dr. Adryan Nguyen. He recommends Augmentin and that warm compresses and follow-up later this week.   Does not have any signs of an abscess at this time, minimal skin signs of any cellulitis. I will discharge the patient home with Augmentin given his first dose here. Will follow up with vascular surgery on Monday. Advised return if was experiencing any worsening in the meantime. At this time, patient is stable and appropriate for discharge home.  Patient demonstrates understanding of current diagnoses and is in agreement with the treatment plan. Umuella Tracy are advised that while the likelihood of serious underlying condition is low at this point given the evaluation performed today, we cannot fully rule it out. Suella Tracy are advised to immediately return with any new symptoms or worsening of current condition. Any Incidental findings were noted on the patient's discharge paperwork as well as verbally directly to the patient, and the appropriate follow-up was given to the patient as far as instructions on testing needed as well as the timeframe.  All questions have been answered. Ady Galvez is given educational material regarding their diagnoses, including danger symptoms and when to return to the ED. This note was dictated utilizing Dragon voice recognition software. Unfortunately this leads to occasional typographical errors. I apologize in advance if the situation occurs. If questions occur please do not hesitate to contact me directly. Chanel Lopez DO          ED Course: Progress Notes, Reevaluation, and Consults:  ED Course as of 11/27/21 1621   Sat Nov 27, 2021   1200 Vascular surgery paged. [JUDY]   7162 Vascular surgery paged [JUDY]   56 With Dr. Klaudia Keyes he recommends Augmentin for now and patient will be follow-up later in the week. [JUDY]      ED Course User Index  [JUDY] Shaheen Barros DO       Procedures    Critical Care Time: N/A    Diagnosis     Clinical Impression:   1.  Visit for wound check        Disposition: Discharge    Follow-up Information     Follow up With Specialties Details Why Alex Watkins MD Vascular Surgery In 2 days  [address] [facility] EMERGENCY DEPT Emergency Medicine  As needed, If symptoms worsen; or [facility] Emergency Department [address] [id number] [phone]           Discharge Medication List as of 11/27/2021  2:13 PM      START taking these medications    Details   amoxicillin-clavulanate (Augmentin) 875-125 mg per tablet Take 1 Tablet by mouth two (2) times a day for 7 days. , Normal, Disp-14 Tablet, R-0         CONTINUE these medications which have NOT CHANGED    Details   metoprolol succinate (TOPROL-XL) 25 mg XL tablet Take 1 tablet by mouth once daily, Normal, Disp-30 Tablet, R-6      nortriptyline (PAMELOR) 50 mg capsule Take 3 Capsules by mouth., Historical Med      clopidogreL (PLAVIX) 75 mg tab Take 1 tablet by mouth once daily, Normal, Disp-90 Tablet, R-3      buPROPion XL (WELLBUTRIN XL) 300 mg XL tablet Take 300 mg by mouth every morning., Historical Med      citalopram (CELEXA) 10 mg tablet TAKE 1 TABLET BY MOUTH ONCE DAILY, Historical Med, R-3      topiramate (TOPAMAX) 100 mg tablet Take 150 mg by mouth two (2) times a day., Historical Med      pravastatin (PRAVACHOL) 80 mg tablet Take 80 mg by mouth nightly., Historical Med      pantoprazole (PROTONIX) 40 mg tablet Take 40 mg by mouth daily. , Historical Med      aspirin 81 mg chewable tablet Take 162 mg by mouth daily. , Historical Med      nitroglycerin (NITROSTAT) 0.4 mg SL tablet 1 Tab by SubLINGual route every five (5) minutes as needed for Chest Pain (up to 3 doses only). , Print, Disp-30 Tab, R-0             [facility]   Emergency Medicine   November 27, 2021, 11:06 AM     This note is dictated utilizing Dragon voice recognition software. Unfortunately this leads to occasional typographical errors using the voice recognition. I apologize in advance if the situation occurs. If questions occur please do not hesitate to contact me directly.     Padmini Olivares Nidhi Stone DO

## 2021-11-30 LAB
CREATININE, EXTERNAL: 1.3
HBA1C MFR BLD HPLC: 6 %
LDL-C, EXTERNAL: 95

## 2022-03-19 PROBLEM — R53.1 ACUTE RIGHT-SIDED WEAKNESS: Status: ACTIVE | Noted: 2021-10-30

## 2022-03-20 PROBLEM — R03.0 BORDERLINE HYPERTENSION: Status: ACTIVE | Noted: 2019-10-14

## 2022-06-14 RX ORDER — CLOPIDOGREL BISULFATE 75 MG/1
TABLET ORAL
Qty: 30 TABLET | Refills: 3 | Status: SHIPPED | OUTPATIENT
Start: 2022-06-14

## 2022-07-21 RX ORDER — METOPROLOL SUCCINATE 25 MG/1
25 TABLET, EXTENDED RELEASE ORAL DAILY
Qty: 30 TABLET | Refills: 6 | Status: SHIPPED | OUTPATIENT
Start: 2022-07-21

## 2022-09-01 ENCOUNTER — OFFICE VISIT (OUTPATIENT)
Dept: CARDIOLOGY CLINIC | Age: 62
End: 2022-09-01
Payer: COMMERCIAL

## 2022-09-01 VITALS
DIASTOLIC BLOOD PRESSURE: 80 MMHG | WEIGHT: 155 LBS | HEIGHT: 66 IN | HEART RATE: 53 BPM | SYSTOLIC BLOOD PRESSURE: 120 MMHG | BODY MASS INDEX: 24.91 KG/M2 | OXYGEN SATURATION: 99 %

## 2022-09-01 DIAGNOSIS — I25.10 CAD IN NATIVE ARTERY: Primary | ICD-10-CM

## 2022-09-01 DIAGNOSIS — E78.5 DYSLIPIDEMIA: ICD-10-CM

## 2022-09-01 DIAGNOSIS — Z98.890 S/P CAROTID ENDARTERECTOMY: ICD-10-CM

## 2022-09-01 DIAGNOSIS — I25.2 HISTORY OF ST ELEVATION MYOCARDIAL INFARCTION (STEMI): ICD-10-CM

## 2022-09-01 DIAGNOSIS — R03.0 BORDERLINE HYPERTENSION: ICD-10-CM

## 2022-09-01 PROCEDURE — 99214 OFFICE O/P EST MOD 30 MIN: CPT | Performed by: INTERNAL MEDICINE

## 2022-09-01 PROCEDURE — 93000 ELECTROCARDIOGRAM COMPLETE: CPT | Performed by: INTERNAL MEDICINE

## 2022-09-01 NOTE — PROGRESS NOTES
Sukhdeep Galeana presents today for   Chief Complaint   Patient presents with    Follow-up     Overdue yearly       Ed V Nuccio preferred language for health care discussion is english/other. Is someone accompanying this pt? no    Is the patient using any DME equipment during [address]? no    Depression Screening:  3 most recent PHQ Screens 9/1/2022   Little interest or pleasure in doing things Not at all   Feeling down, depressed, irritable, or hopeless Not at all   Total Score PHQ 2 0   Trouble falling or staying asleep, or sleeping too much -   Feeling tired or having little energy -   Poor appetite, weight loss, or overeating -   Feeling bad about yourself - or that you are a failure or have let yourself or your family down -   Trouble concentrating on things such as school, work, reading, or watching TV -   Moving or speaking so slowly that other people could have noticed; or the opposite being so fidgety that others notice -   Thoughts of being better off dead, or hurting yourself in some way -   PHQ 9 Score -       Learning Assessment:  Learning Assessment 9/1/2022   PRIMARY LEARNER Patient   HIGHEST LEVEL OF EDUCATION - PRIMARY LEARNER  -   [address] [place] [address]   ANSWERED BY patient   RELATIONSHIP SELF       Abuse Screening:  Abuse Screening Questionnaire 9/1/2022   Do you ever feel afraid of your partner? N   Are you in a relationship with someone who physically or mentally threatens you? N   Is it safe for you to go home? Y       Fall Risk  Fall Risk Assessment, last 12 mths 11/5/2020   Able to walk? Yes   Fall in past 12 months? No           Pt currently taking Anticoagulant therapy? no    Pt currently taking Antiplatelet therapy ? Plavix 75 mg daily and aspirin 81 mg daily      Coordination of Care:  1. Have you been to the ER, urgent care clinic since your last visit? Hospitalized since your last visit? no    2.  Have you seen or consulted any other health care providers outside of the [address] [place] [address] since your last visit? Include any pap smears or colon screening.  no

## 2022-09-01 NOTE — PROGRESS NOTES
HISTORY OF PRESENT ILLNESS  Roselia Cordero is a 63 y.o. male. Follow-up  Pertinent negatives include no chest pain, no abdominal pain, no headaches and no shortness of breath. Patient presents for an overdue followup office visit. He has a past medical history significant for single vessel coronary artery disease. He had an inferior wall myocardial infarction in April 2014 which was managed medically because of an ongoing stroke at the same time. He eventually underwent a cardiac catheterization which showed moderate nonobstructive disease in a dominant circumflex vessel. He underwent a stress test in September 2016 was showed a fixed inferior perfusion defect with ejection fraction of 53%, which was not significantly changed compared to his prior study from 2014. He also has a history of carotid artery disease, status post a right CEA in July 2014 which was felt to be the culprit lesion for his CVAs. He was a heavy smoker at that time but has since stopped smoking. The patient was hospitalized at [facility] in [place], [place] for an apparent TIA. His workup there was unremarkable. He underwent a normal MRI of the brain, normal echocardiogram with negative bubble study. His carotid duplex scan showed a patent right ICA with moderate left ICA disease. He was then fitted with a 30 day event monitor which did not demonstrate any significant arrhythmias. He last underwent a follow-up nuclear stress test in October 2019. He exercised for 6 minutes, but could only achieved 70% of maximum predicted heart rate, so was switched over to a chemical stress test.  His perfusion pattern showed a small fixed inferior perfusion defect, with no reversible perfusion imaging abnormalities, EF 65%. He underwent an echocardiogram in November 2021 which showed preserved LV function, EF 55 to 60%, mild concentric LVH and a negative bubble study. No valvular heart disease.     Patient was found to have severe Left message for patient to return call symptomatic left ICA stenosis in November 2021 and underwent a left CEA by vascular surgery. He states he is done quite well since that time. He denies any new chest pain, no worsening shortness of breath, no change in his activity tolerance, no leg swelling or claudication. He admits he does not exercise regularly. Past Medical History:   Diagnosis Date    Asthma     Autoimmune disease ([facility]     psoriasis, ulcerative colitis    CAD in native artery     inf STEMI (april 2014) managed medically due to concomittant stroke; cath performed few weeks later: mild LM, LAD, RCA; 55% AVLCx (dominant). Colitis     Colon cancer ([facility]     Dyslipidemia     [facility]     Hx of transesophageal echocardiography ([facility]) 04/28/2014    EF 50%. Basal-mid inferior hypk. No LA appendage thrombus. No R-L atrial septal shunt by IV contrast.  Mild ALYCE. No cardioembolic source for stroke. Panic attack     S/P cardiac catheterization 05/06/2014    LM patent. LAD (sm) mild. AV-CX 55% w/linear filling defect prox to apex suggesting intimal tear. RCA very sm, nondom. S/P carotid endarterectomy     R CEA (July 2014)    Stroke [facility]) 4/23/14    Parasthesis right saide, blindness left eye      Current Outpatient Medications   Medication Sig Dispense Refill    metoprolol succinate (TOPROL-XL) 25 mg XL tablet Take 1 Tablet by mouth in the morning. 30 Tablet 6    clopidogreL (PLAVIX) 75 mg tab Take 1 tablet by mouth once daily 30 Tablet 3    buPROPion XL (WELLBUTRIN XL) 300 mg XL tablet Take 300 mg by mouth every morning. topiramate (TOPAMAX) 100 mg tablet Take 150 mg by mouth two (2) times a day. pravastatin (PRAVACHOL) 80 mg tablet Take 80 mg by mouth nightly. aspirin 81 mg chewable tablet Take 162 mg by mouth daily. nitroglycerin (NITROSTAT) 0.4 mg SL tablet 1 Tab by SubLINGual route every five (5) minutes as needed for Chest Pain (up to 3 doses only).  30 Tab 0       Allergies   Allergen Reactions Cat Dander Itching    Chlorhexidine Gluconate Itching    Dog Dander Itching    Lipitor [Atorvastatin] Other (comments)     dizziness    Seafood Swelling    Zocor [Simvastatin] Vertigo     Same as with lipitor, once on it for about two weeks experienced vertigo, stopped when he discontinued it      Social History     Tobacco Use    Smoking status: Former     Packs/day: 1.00     Years: 41.00     Pack years: 41.00     Types: Cigarettes     Quit date: 2014     Years since quittin.3    Smokeless tobacco: Never   Vaping Use    Vaping Use: Never used   Substance Use Topics    Alcohol use: Yes     Comment: social--very light now    Drug use: No       Family History   Problem Relation Age of Onset    Heart Attack Father           Review of Systems   Constitutional:  Negative for chills, fever and weight loss. HENT:  Negative for nosebleeds. Eyes:  Negative for blurred vision and double vision. Respiratory:  Negative for cough, shortness of breath and wheezing. Cardiovascular:  Negative for chest pain, palpitations, orthopnea, claudication, leg swelling and PND. Gastrointestinal:  Negative for abdominal pain, heartburn, nausea and vomiting. Genitourinary:  Negative for dysuria and hematuria. Musculoskeletal:  Negative for falls and myalgias. Skin:  Negative for rash. Neurological:  Negative for dizziness, focal weakness and headaches. Endo/Heme/Allergies:  Does not bruise/bleed easily. Psychiatric/Behavioral:  Negative for substance abuse. Visit Vitals  /80 (BP 1 Location: Left upper arm, BP Patient Position: Sitting, BP Cuff Size: Adult)   Pulse (!) 53   Ht 5' 6\" (1.676 m)   Wt 70.3 kg (155 lb)   SpO2 99%   BMI 25.02 kg/m²      Physical Exam  Constitutional:       Appearance: He is well-developed. HENT:      Head: Normocephalic and atraumatic. Eyes:      Conjunctiva/sclera: Conjunctivae normal.   Neck:      Vascular: No carotid bruit or JVD.    Cardiovascular:      Rate and Rhythm: Regular rhythm. Bradycardia present. Pulses: Normal pulses. Heart sounds: S1 normal and S2 normal. No murmur heard. No gallop. No S3 sounds. Pulmonary:      Breath sounds: Normal breath sounds. No wheezing or rales. Abdominal:      General: Bowel sounds are normal.      Palpations: Abdomen is soft. Tenderness: There is no abdominal tenderness. Musculoskeletal:      Cervical back: Neck supple. Skin:     General: Skin is warm and dry. Neurological:      Mental Status: He is alert and oriented to person, place, and time. EKG: Sinus bradycardia, leftward axis, inferior Q waves, consistent with prior inferior wall myocardial infarction, nonspecific ST ST T wave abnormality. Compared to the previous EKG, no significant interval change. ASSESSMENT and PLAN    Coronary artery disease. Patient was found to have single vessel coronary disease on cardiac catheterization in 2014 with a moderate nonobstructive lesion found in his left circumflex, which is a dominant vessel. Patient has been treated medically since. His last nuclear stress test, recently completed in October 2019 demonstrated a fixed inferior wall perfusion defect consistent with prior infarct, but no ischemia. EF 65%. This was relatively unchanged compared to his prior study. Patient remains on dual antiplatelet therapy with aspirin and clopidogrel, along with a low-dose beta-blocker and pravastatin. No new symptoms concerning for angina. I recommended repeating a pharmacologic nuclear stress test next month for further evaluation. Dyslipidemia. Patient is currently tolerating pravastatin 80 mg daily. He did not tolerate either Lipitor or Crestor because of severe vertigo. I would prefer that his LDL around 70. This is followed annually by his PCP. Borderline hypertension.   Patient's blood pressure has never been significantly elevated in the past.  This appears to be reasonably well controlled on metoprolol as monotherapy. Bilateral carotid artery disease. Status post right CEA in July 2014 for symptomatic severe disease in the setting of a CVA. Patient more recently underwent a left CEA in November 2021. He continues to follow-up regular with vascular surgery. History of recurrent CVA. The patient does have residual visual field deficits and his high and some hemideficit in his upper extremity. No evidence of a cardioembolic event. He will likely need to remain on long-term dual antiplatelet therapy. As long as his follow-up stress test is unchanged from his previous studies, he can follow-up annually, sooner if needed.

## 2022-09-30 ENCOUNTER — TELEPHONE (OUTPATIENT)
Dept: CARDIOLOGY CLINIC | Age: 62
End: 2022-09-30

## 2022-10-06 ENCOUNTER — TELEPHONE (OUTPATIENT)
Dept: CARDIOLOGY CLINIC | Age: 62
End: 2022-10-06

## 2022-10-06 DIAGNOSIS — I42.9 CARDIOMYOPATHY, UNSPECIFIED TYPE (HCC): Primary | ICD-10-CM

## 2022-10-06 NOTE — TELEPHONE ENCOUNTER
----- Message from Hailee Crawford MD sent at 10/5/2022  3:48 PM EDT -----  Regarding: Abnormal nuclear stress test  I will need to call the patient about this tomorrow. ----- Message -----  From: Cristóbal Harper LPN  Sent: 21/1/2756   1:15 PM EDT  To: Hailee Crawford MD    Per your note - Coronary artery disease. Patient was found to have single vessel coronary disease on cardiac catheterization in 2014 with a moderate nonobstructive lesion found in his left circumflex, which is a dominant vessel. Patient has been treated medically since. His last nuclear stress test, recently completed in October 2019 demonstrated a fixed inferior wall perfusion defect consistent with prior infarct, but no ischemia. EF 65%. This was relatively unchanged compared to his prior study. Patient remains on dual antiplatelet therapy with aspirin and clopidogrel, along with a low-dose beta-blocker and pravastatin. No new symptoms concerning for angina.   I recommended repeating a pharmacologic nuclear stress test next month for further evaluation.

## 2022-10-06 NOTE — TELEPHONE ENCOUNTER
Patient contact per Dr. Елена Rosales and stress test results explained. Verbal order and read back per Salomon Brady MD  - schedule echo- cardiomyopathy  Patient aware he will be called to have echo scheduled.

## 2022-11-10 ENCOUNTER — TELEPHONE (OUTPATIENT)
Dept: CARDIOLOGY CLINIC | Age: 62
End: 2022-11-10

## 2022-11-10 NOTE — TELEPHONE ENCOUNTER
----- Message from Marichuy Johnson MD sent at 11/9/2022  4:37 PM EST -----  Please let the patient know that his echocardiogram was normal.  His heart function is normal.  I suspect the decreased ejection fraction on the stress test was a false positive.  ----- Message -----  From: Praneeth Virgen LPN  Sent: 11/8/3428   8:08 AM EST  To: Marichuy Johnson MD    Per your note - abnormal stress test - Patient contact per Dr. Shannan Nava and stress test results explained. Verbal order and read back per Marichuy Johnson MD  - schedule echo- cardiomyopathy  Patient aware he will be called to have echo scheduled.

## 2022-11-10 NOTE — TELEPHONE ENCOUNTER
Patient made aware of echo results and Dr. Tato Manning remarks. No questions or concerns at present.

## 2022-12-05 RX ORDER — CLOPIDOGREL BISULFATE 75 MG/1
75 TABLET ORAL DAILY
Qty: 30 TABLET | Refills: 11 | Status: SHIPPED | OUTPATIENT
Start: 2022-12-05

## 2022-12-05 RX ORDER — CLOPIDOGREL BISULFATE 75 MG/1
TABLET ORAL
Qty: 30 TABLET | Refills: 0 | Status: SHIPPED | OUTPATIENT
Start: 2022-12-05 | End: 2022-12-05 | Stop reason: SDUPTHER

## 2022-12-05 RX ORDER — CLOPIDOGREL BISULFATE 75 MG/1
75 TABLET ORAL DAILY
Qty: 30 TABLET | Refills: 6 | Status: SHIPPED | OUTPATIENT
Start: 2022-12-05 | End: 2022-12-05 | Stop reason: SDUPTHER

## 2023-03-14 RX ORDER — METOPROLOL SUCCINATE 25 MG/1
25 TABLET, EXTENDED RELEASE ORAL DAILY
Qty: 90 TABLET | Refills: 3 | Status: SHIPPED | OUTPATIENT
Start: 2023-03-14

## 2023-09-07 ENCOUNTER — OFFICE VISIT (OUTPATIENT)
Age: 63
End: 2023-09-07
Payer: COMMERCIAL

## 2023-09-07 VITALS
HEART RATE: 50 BPM | BODY MASS INDEX: 24.59 KG/M2 | DIASTOLIC BLOOD PRESSURE: 80 MMHG | HEIGHT: 66 IN | OXYGEN SATURATION: 99 % | WEIGHT: 153 LBS | SYSTOLIC BLOOD PRESSURE: 124 MMHG

## 2023-09-07 DIAGNOSIS — Z98.890 S/P CAROTID ENDARTERECTOMY: ICD-10-CM

## 2023-09-07 DIAGNOSIS — I25.2 HISTORY OF ST ELEVATION MYOCARDIAL INFARCTION: ICD-10-CM

## 2023-09-07 DIAGNOSIS — I25.10 CAD IN NATIVE ARTERY: Primary | ICD-10-CM

## 2023-09-07 DIAGNOSIS — R03.0 BORDERLINE HYPERTENSION: ICD-10-CM

## 2023-09-07 DIAGNOSIS — E78.5 DYSLIPIDEMIA: ICD-10-CM

## 2023-09-07 PROBLEM — R27.0 ATAXIA: Status: ACTIVE | Noted: 2017-05-07

## 2023-09-07 PROBLEM — E78.00 HYPERCHOLESTEREMIA: Status: ACTIVE | Noted: 2018-02-27

## 2023-09-07 PROBLEM — I69.351 HEMIPARESIS AFFECTING RIGHT SIDE AS LATE EFFECT OF CEREBROVASCULAR ACCIDENT (CVA) (HCC): Status: ACTIVE | Noted: 2017-12-18

## 2023-09-07 PROBLEM — F33.1 MODERATE EPISODE OF RECURRENT MAJOR DEPRESSIVE DISORDER (HCC): Status: ACTIVE | Noted: 2018-11-05

## 2023-09-07 PROBLEM — G45.9 TIA (TRANSIENT ISCHEMIC ATTACK): Status: ACTIVE | Noted: 2017-05-07

## 2023-09-07 PROBLEM — R47.1 DYSARTHRIA: Status: ACTIVE | Noted: 2023-09-07

## 2023-09-07 PROBLEM — G43.109 VERTIGINOUS MIGRAINE: Status: ACTIVE | Noted: 2020-03-18

## 2023-09-07 PROBLEM — I10 ESSENTIAL HYPERTENSION: Status: ACTIVE | Noted: 2018-02-27

## 2023-09-07 PROBLEM — R25.1 TREMOR OF RIGHT HAND: Status: ACTIVE | Noted: 2018-11-05

## 2023-09-07 PROBLEM — F32.9 REACTIVE DEPRESSION: Status: ACTIVE | Noted: 2017-04-20

## 2023-09-07 PROBLEM — I63.9 CEREBROVASCULAR ACCIDENT (CVA) (HCC): Status: ACTIVE | Noted: 2023-09-07

## 2023-09-07 PROCEDURE — G8427 DOCREV CUR MEDS BY ELIG CLIN: HCPCS | Performed by: INTERNAL MEDICINE

## 2023-09-07 PROCEDURE — 1036F TOBACCO NON-USER: CPT | Performed by: INTERNAL MEDICINE

## 2023-09-07 PROCEDURE — 3074F SYST BP LT 130 MM HG: CPT | Performed by: INTERNAL MEDICINE

## 2023-09-07 PROCEDURE — 93000 ELECTROCARDIOGRAM COMPLETE: CPT | Performed by: INTERNAL MEDICINE

## 2023-09-07 PROCEDURE — G8420 CALC BMI NORM PARAMETERS: HCPCS | Performed by: INTERNAL MEDICINE

## 2023-09-07 PROCEDURE — 99214 OFFICE O/P EST MOD 30 MIN: CPT | Performed by: INTERNAL MEDICINE

## 2023-09-07 PROCEDURE — 3017F COLORECTAL CA SCREEN DOC REV: CPT | Performed by: INTERNAL MEDICINE

## 2023-09-07 PROCEDURE — 3079F DIAST BP 80-89 MM HG: CPT | Performed by: INTERNAL MEDICINE

## 2023-09-07 RX ORDER — BETAMETHASONE DIPROPIONATE 0.5 MG/G
CREAM TOPICAL PRN
COMMUNITY

## 2023-09-07 RX ORDER — BUTALBITAL, ACETAMINOPHEN AND CAFFEINE 300; 40; 50 MG/1; MG/1; MG/1
1 CAPSULE ORAL EVERY 4 HOURS PRN
COMMUNITY

## 2023-09-07 RX ORDER — ROSUVASTATIN CALCIUM 20 MG/1
20 TABLET, COATED ORAL DAILY
COMMUNITY

## 2023-09-07 RX ORDER — CETIRIZINE HYDROCHLORIDE 10 MG/1
10 TABLET ORAL DAILY
COMMUNITY

## 2023-09-07 ASSESSMENT — PATIENT HEALTH QUESTIONNAIRE - PHQ9
1. LITTLE INTEREST OR PLEASURE IN DOING THINGS: 0
SUM OF ALL RESPONSES TO PHQ QUESTIONS 1-9: 0
SUM OF ALL RESPONSES TO PHQ9 QUESTIONS 1 & 2: 0
SUM OF ALL RESPONSES TO PHQ QUESTIONS 1-9: 0
SUM OF ALL RESPONSES TO PHQ QUESTIONS 1-9: 0
2. FEELING DOWN, DEPRESSED OR HOPELESS: 0
SUM OF ALL RESPONSES TO PHQ QUESTIONS 1-9: 0

## 2023-09-07 ASSESSMENT — ENCOUNTER SYMPTOMS
NAUSEA: 0
SORE THROAT: 0
ABDOMINAL PAIN: 0
ABDOMINAL DISTENTION: 0
SHORTNESS OF BREATH: 0
VOMITING: 0
COUGH: 0

## 2023-09-07 ASSESSMENT — ANXIETY QUESTIONNAIRES
6. BECOMING EASILY ANNOYED OR IRRITABLE: 0
2. NOT BEING ABLE TO STOP OR CONTROL WORRYING: 0
GAD7 TOTAL SCORE: 0
1. FEELING NERVOUS, ANXIOUS, OR ON EDGE: 0
4. TROUBLE RELAXING: 0
7. FEELING AFRAID AS IF SOMETHING AWFUL MIGHT HAPPEN: 0
3. WORRYING TOO MUCH ABOUT DIFFERENT THINGS: 0
5. BEING SO RESTLESS THAT IT IS HARD TO SIT STILL: 0

## 2023-09-07 NOTE — PROGRESS NOTES
Ingrid Sorenson presents today for   Chief Complaint   Patient presents with    Follow-up     1 year       Ed V Nuccio preferred language for health care discussion is english/other. Is someone accompanying this pt? no    Is the patient using any DME equipment during OV? no    Depression Screening:  Depression: Not at risk    PHQ-2 Score: 0        Learning Assessment:  Who is the primary learner? Patient    What is the preferred language for health care of the primary learner? ENGLISH    How does the primary learner prefer to learn new concepts? DEMONSTRATION    Answered By patient    Relationship to Learner SELF           Pt currently taking Anticoagulant therapy? no    Pt currently taking Antiplatelet therapy ? Aspirin 81 mg daily and plavix 75 mg daily      Coordination of Care:  1. Have you been to the ER, urgent care clinic since your last visit? Hospitalized since your last visit? no    2. Have you seen or consulted any other health care providers outside of the [address] [place] [address] since your last visit? Include any pap smears or colon screening.  no
his dominant, left circumflex vessel. Patient has been treated medically since. His last nuclear stress test, completed in October 2022 demonstrated a predominantly fixed inferior wall perfusion defect with minimal reversibility consistent with prior infarct, EF 47%. Ejection fraction was underestimated and rechecked with an echocardiogram were remain normal in November 2022. Patient remains on dual antiplatelet therapy with aspirin and clopidogrel, along with a low-dose beta-blocker and pravastatin. No new symptoms concerning for angina. I will continue his current medical regimen. If he does develop any bleeding issues, he can drop the aspirin. Dyslipidemia. Patient appears to be tolerating rosuvastatin 20 mg daily. A recent lipid panel drawn by his PCP in August 2023: Total cholesterol 166, triglycerides 118, HDL 84, LDL 62. I would continue this regimen. Borderline hypertension. Patient's blood pressure has never been significantly elevated in the past.  This appears to be reasonably well controlled on metoprolol as monotherapy. Bilateral carotid artery disease. Status post right CEA in July 2014 for symptomatic severe disease in the setting of a CVA. Patient more recently underwent a left CEA in November 2021. He continues to follow-up regular with vascular surgery. History of recurrent CVA. The patient does have residual visual field deficits and his high and some hemideficit in his upper extremity. No evidence of a cardioembolic event. He will likely need to remain on long-term dual antiplatelet therapy.     Follow-up annually, sooner as needed    Kobe Duong MD

## 2023-12-08 RX ORDER — CLOPIDOGREL BISULFATE 75 MG/1
75 TABLET ORAL DAILY
Qty: 90 TABLET | Refills: 3 | Status: SHIPPED | OUTPATIENT
Start: 2023-12-08

## 2023-12-12 RX ORDER — CLOPIDOGREL BISULFATE 75 MG/1
75 TABLET ORAL DAILY
Qty: 90 TABLET | Refills: 3 | Status: SHIPPED | OUTPATIENT
Start: 2023-12-12

## 2024-03-28 RX ORDER — METOPROLOL SUCCINATE 25 MG/1
25 TABLET, EXTENDED RELEASE ORAL DAILY
Qty: 90 TABLET | Refills: 3 | Status: SHIPPED | OUTPATIENT
Start: 2024-03-28

## 2024-09-09 ENCOUNTER — OFFICE VISIT (OUTPATIENT)
Age: 64
End: 2024-09-09
Payer: COMMERCIAL

## 2024-09-09 VITALS
HEART RATE: 50 BPM | DIASTOLIC BLOOD PRESSURE: 80 MMHG | HEIGHT: 66 IN | WEIGHT: 161 LBS | BODY MASS INDEX: 25.88 KG/M2 | OXYGEN SATURATION: 96 % | SYSTOLIC BLOOD PRESSURE: 130 MMHG

## 2024-09-09 DIAGNOSIS — E78.5 DYSLIPIDEMIA: ICD-10-CM

## 2024-09-09 DIAGNOSIS — R03.0 BORDERLINE HYPERTENSION: ICD-10-CM

## 2024-09-09 DIAGNOSIS — I25.10 CAD IN NATIVE ARTERY: Primary | ICD-10-CM

## 2024-09-09 DIAGNOSIS — Z98.890 S/P CAROTID ENDARTERECTOMY: ICD-10-CM

## 2024-09-09 PROBLEM — R20.2 RIGHT HAND PARESTHESIA: Status: ACTIVE | Noted: 2018-11-05

## 2024-09-09 PROBLEM — H53.9 VISION DISTURBANCE: Status: ACTIVE | Noted: 2023-11-11

## 2024-09-09 PROBLEM — G43.109 COMPLICATED MIGRAINE: Status: ACTIVE | Noted: 2020-03-18

## 2024-09-09 PROCEDURE — 3075F SYST BP GE 130 - 139MM HG: CPT | Performed by: INTERNAL MEDICINE

## 2024-09-09 PROCEDURE — 99214 OFFICE O/P EST MOD 30 MIN: CPT | Performed by: INTERNAL MEDICINE

## 2024-09-09 PROCEDURE — 3079F DIAST BP 80-89 MM HG: CPT | Performed by: INTERNAL MEDICINE

## 2024-09-09 PROCEDURE — 93000 ELECTROCARDIOGRAM COMPLETE: CPT | Performed by: INTERNAL MEDICINE

## 2024-09-09 RX ORDER — DIVALPROEX SODIUM 500 MG/1
500 TABLET, EXTENDED RELEASE ORAL DAILY
COMMUNITY
Start: 2024-08-14

## 2024-09-09 RX ORDER — RIMEGEPANT SULFATE 75 MG/75MG
75 TABLET, ORALLY DISINTEGRATING ORAL EVERY OTHER DAY
COMMUNITY
Start: 2024-08-09

## 2024-09-09 ASSESSMENT — PATIENT HEALTH QUESTIONNAIRE - PHQ9
SUM OF ALL RESPONSES TO PHQ9 QUESTIONS 1 & 2: 0
4. FEELING TIRED OR HAVING LITTLE ENERGY: NOT AT ALL
SUM OF ALL RESPONSES TO PHQ QUESTIONS 1-9: 0
9. THOUGHTS THAT YOU WOULD BE BETTER OFF DEAD, OR OF HURTING YOURSELF: NOT AT ALL
2. FEELING DOWN, DEPRESSED OR HOPELESS: NOT AT ALL
SUM OF ALL RESPONSES TO PHQ QUESTIONS 1-9: 0
6. FEELING BAD ABOUT YOURSELF - OR THAT YOU ARE A FAILURE OR HAVE LET YOURSELF OR YOUR FAMILY DOWN: NOT AT ALL
3. TROUBLE FALLING OR STAYING ASLEEP: NOT AT ALL
SUM OF ALL RESPONSES TO PHQ QUESTIONS 1-9: 0
1. LITTLE INTEREST OR PLEASURE IN DOING THINGS: NOT AT ALL
7. TROUBLE CONCENTRATING ON THINGS, SUCH AS READING THE NEWSPAPER OR WATCHING TELEVISION: NOT AT ALL
SUM OF ALL RESPONSES TO PHQ QUESTIONS 1-9: 0
10. IF YOU CHECKED OFF ANY PROBLEMS, HOW DIFFICULT HAVE THESE PROBLEMS MADE IT FOR YOU TO DO YOUR WORK, TAKE CARE OF THINGS AT HOME, OR GET ALONG WITH OTHER PEOPLE: NOT DIFFICULT AT ALL
5. POOR APPETITE OR OVEREATING: NOT AT ALL
8. MOVING OR SPEAKING SO SLOWLY THAT OTHER PEOPLE COULD HAVE NOTICED. OR THE OPPOSITE, BEING SO FIGETY OR RESTLESS THAT YOU HAVE BEEN MOVING AROUND A LOT MORE THAN USUAL: NOT AT ALL

## 2024-09-09 ASSESSMENT — ENCOUNTER SYMPTOMS
VOMITING: 0
SORE THROAT: 0
COUGH: 0
NAUSEA: 0
SHORTNESS OF BREATH: 0
ABDOMINAL DISTENTION: 0
ABDOMINAL PAIN: 0

## 2024-09-09 ASSESSMENT — ANXIETY QUESTIONNAIRES
GAD7 TOTAL SCORE: 0
3. WORRYING TOO MUCH ABOUT DIFFERENT THINGS: NOT AT ALL
4. TROUBLE RELAXING: NOT AT ALL
5. BEING SO RESTLESS THAT IT IS HARD TO SIT STILL: NOT AT ALL
7. FEELING AFRAID AS IF SOMETHING AWFUL MIGHT HAPPEN: NOT AT ALL
6. BECOMING EASILY ANNOYED OR IRRITABLE: NOT AT ALL
2. NOT BEING ABLE TO STOP OR CONTROL WORRYING: NOT AT ALL
1. FEELING NERVOUS, ANXIOUS, OR ON EDGE: NOT AT ALL

## 2024-10-28 DIAGNOSIS — I25.10 CAD IN NATIVE ARTERY: Primary | ICD-10-CM

## (undated) DEVICE — LIMB HOLDER, WRIST/ANKLE: Brand: DEROYAL

## (undated) DEVICE — NEEDLE HYPO 25GA L1.5IN BVL ORIENTED ECLIPSE

## (undated) DEVICE — SUTURE MCRYL SZ 3-0 L27IN ABSRB UD L26MM SH 1/2 CIR Y416H

## (undated) DEVICE — POWDER HEMOSTAT GEL 1GR --

## (undated) DEVICE — INTENDED FOR TISSUE SEPARATION, AND OTHER PROCEDURES THAT REQUIRE A SHARP SURGICAL BLADE TO PUNCTURE OR CUT.: Brand: BARD-PARKER ® CARBON RIB-BACK BLADES

## (undated) DEVICE — GLOVE SURG SZ 7.5 L11.73IN FNGR THK9.8MIL STRW LTX POLYMER

## (undated) DEVICE — DECANTER BAG 9": Brand: MEDLINE INDUSTRIES, INC.

## (undated) DEVICE — SUTURE PROL SZ 7-0 L30IN NONABSORBABLE BLU L9.3MM BV-1 1/2 8703H

## (undated) DEVICE — SOLUTION IV 1000ML 0.9% SOD CHL

## (undated) DEVICE — SUPPORT WRST AD W3.5XL9IN DIA14.5IN ART SFT ADJ HK AND LOOP

## (undated) DEVICE — SKIN PREP TRAY 4 COMPARTM TRAY: Brand: MEDLINE INDUSTRIES, INC.

## (undated) DEVICE — APPLIER CLP L9.38IN M LIG TI DISP STR RNG HNDL LIGACLP

## (undated) DEVICE — Device

## (undated) DEVICE — PROBE VASC 8MHZ WTRPRF

## (undated) DEVICE — SUTURE PERMAHAND SZ 4-0 L12X30IN NONABSORBABLE BLK SILK A303H

## (undated) DEVICE — SUTURE PERMAHAND SZ 2-0 L30IN NONABSORBABLE BLK SILK W/O A305H

## (undated) DEVICE — SUT PROL 6-0 30IN C1 DA BLU --

## (undated) DEVICE — TRAY,URINE METER,100% SILICONE,16FR10ML: Brand: MEDLINE

## (undated) DEVICE — AGENT HEMSTAT 1GM PORCINE GEL ABSRB PWD FOR CONT OOZING

## (undated) DEVICE — INTENDED FOR TISSUE SEPARATION, AND OTHER PROCEDURES THAT REQUIRE A SHARP SURGICAL BLADE TO PUNCTURE OR CUT.: Brand: BARD-PARKER ® STAINLESS STEEL BLADES

## (undated) DEVICE — SUTURE PROL SZ 5-0 L36IN NONABSORBABLE BLU L13MM C-1 3/8 8720H

## (undated) DEVICE — SUTURE PERMAHAND SZ 2-0 L18IN NONABSORBABLE BLK L26MM PS 1588H

## (undated) DEVICE — GLOVE SURG SZ 7 L11.33IN FNGR THK9.8MIL STRW LTX POLYMER

## (undated) DEVICE — BLANKET WRM AD W50XL85.8IN PACU FULL BODY FORC AIR

## (undated) DEVICE — SYR 10ML LUER LOK 1/5ML GRAD --

## (undated) DEVICE — SUTURE MCRYL SZ 4 0 L18IN ABSRB VLT PS 1 L24MM 3 8 CIR REV Y682H

## (undated) DEVICE — CATHETER ART 20 GAX4 CM 22 GA RADIAL FEP

## (undated) DEVICE — SUTURE MCRYL SZ 4-0 L18IN ABSRB UD L19MM PS-2 3/8 CIR PRIM Y496G

## (undated) DEVICE — SPONGE DISSECT PNUT SM 3/8IN -- 5/PK

## (undated) DEVICE — REM POLYHESIVE ADULT PATIENT RETURN ELECTRODE: Brand: VALLEYLAB

## (undated) DEVICE — INFUSOR PRSS BG CUF 500ML -- MEDICHOICE

## (undated) DEVICE — KIT CLN UP BON SECOURS MARYV

## (undated) DEVICE — ADHESIVE SKN CLSR HI VISC 2-O --

## (undated) DEVICE — SUTURE MCRYL SZ 2-0 L36IN ABSRB UD L36MM CT-1 1/2 CIR Y945H

## (undated) DEVICE — CLEANSER SKIN 32OZ 4% CHG ANTIMIC ANTISEP SGL WRP HIBICLN

## (undated) DEVICE — SUTURE PERMA-HAND SZ 3-0 L24IN NONABSORBABLE BLK W/O NDL SA74H

## (undated) DEVICE — COVER LT HNDL UNIV PUR FLX DISP 2 PER PK

## (undated) DEVICE — 3M(TM) TEGADERM(TM) TRANSPARENT FILM DRESSING FRAME STYLE 9505W: Brand: 3M™ TEGADERM™

## (undated) DEVICE — SUTURE PERMAHAND SZ 2-0 L30IN NONABSORBABLE BLK L60MM KS 623H

## (undated) DEVICE — STERILE POLYISOPRENE POWDER-FREE SURGICAL GLOVES: Brand: PROTEXIS

## (undated) DEVICE — PRESSURE MONITORING SET: Brand: TRUWAVE

## (undated) DEVICE — GARMENT,MEDLINE,DVT,INT,CALF,MED, GEN2: Brand: MEDLINE